# Patient Record
Sex: FEMALE | Race: BLACK OR AFRICAN AMERICAN | NOT HISPANIC OR LATINO
[De-identification: names, ages, dates, MRNs, and addresses within clinical notes are randomized per-mention and may not be internally consistent; named-entity substitution may affect disease eponyms.]

---

## 2017-01-19 ENCOUNTER — APPOINTMENT (OUTPATIENT)
Dept: UROGYNECOLOGY | Facility: CLINIC | Age: 57
End: 2017-01-19

## 2017-01-19 VITALS
WEIGHT: 234 LBS | HEIGHT: 65 IN | SYSTOLIC BLOOD PRESSURE: 130 MMHG | DIASTOLIC BLOOD PRESSURE: 88 MMHG | BODY MASS INDEX: 38.99 KG/M2

## 2017-01-19 RX ORDER — BLOOD SUGAR DIAGNOSTIC
STRIP MISCELLANEOUS
Qty: 100 | Refills: 0 | Status: DISCONTINUED | COMMUNITY
Start: 2016-07-11 | End: 2017-01-19

## 2017-03-02 ENCOUNTER — RECORD ABSTRACTING (OUTPATIENT)
Age: 57
End: 2017-03-02

## 2017-03-02 DIAGNOSIS — Z80.3 FAMILY HISTORY OF MALIGNANT NEOPLASM OF BREAST: ICD-10-CM

## 2017-05-01 ENCOUNTER — APPOINTMENT (OUTPATIENT)
Dept: PODIATRY | Facility: CLINIC | Age: 57
End: 2017-05-01

## 2017-05-01 ENCOUNTER — OUTPATIENT (OUTPATIENT)
Dept: OUTPATIENT SERVICES | Facility: HOSPITAL | Age: 57
LOS: 1 days | Discharge: HOME | End: 2017-05-01

## 2017-05-01 VITALS
HEIGHT: 65 IN | DIASTOLIC BLOOD PRESSURE: 72 MMHG | BODY MASS INDEX: 38.65 KG/M2 | SYSTOLIC BLOOD PRESSURE: 122 MMHG | WEIGHT: 232 LBS | HEART RATE: 78 BPM

## 2017-06-02 ENCOUNTER — OTHER (OUTPATIENT)
Age: 57
End: 2017-06-02

## 2017-06-27 ENCOUNTER — APPOINTMENT (OUTPATIENT)
Dept: UROGYNECOLOGY | Facility: CLINIC | Age: 57
End: 2017-06-27

## 2017-06-28 DIAGNOSIS — M24.274 DISORDER OF LIGAMENT, RIGHT FOOT: ICD-10-CM

## 2017-06-28 DIAGNOSIS — M79.671 PAIN IN RIGHT FOOT: ICD-10-CM

## 2017-06-29 ENCOUNTER — OTHER (OUTPATIENT)
Age: 57
End: 2017-06-29

## 2017-07-14 ENCOUNTER — APPOINTMENT (OUTPATIENT)
Dept: UROGYNECOLOGY | Facility: CLINIC | Age: 57
End: 2017-07-14

## 2017-07-14 ENCOUNTER — OUTPATIENT (OUTPATIENT)
Dept: OUTPATIENT SERVICES | Facility: HOSPITAL | Age: 57
LOS: 1 days | Discharge: HOME | End: 2017-07-14

## 2017-07-14 VITALS
WEIGHT: 233 LBS | SYSTOLIC BLOOD PRESSURE: 128 MMHG | HEIGHT: 65 IN | DIASTOLIC BLOOD PRESSURE: 72 MMHG | BODY MASS INDEX: 38.82 KG/M2

## 2017-07-14 DIAGNOSIS — F93.0 SEPARATION ANXIETY DISORDER OF CHILDHOOD: ICD-10-CM

## 2017-07-14 DIAGNOSIS — E03.9 HYPOTHYROIDISM, UNSPECIFIED: ICD-10-CM

## 2017-07-14 DIAGNOSIS — F11.20 OPIOID DEPENDENCE, UNCOMPLICATED: ICD-10-CM

## 2017-07-14 DIAGNOSIS — I10 ESSENTIAL (PRIMARY) HYPERTENSION: ICD-10-CM

## 2017-07-14 DIAGNOSIS — M19.90 UNSPECIFIED OSTEOARTHRITIS, UNSPECIFIED SITE: ICD-10-CM

## 2017-07-14 DIAGNOSIS — M54.9 DORSALGIA, UNSPECIFIED: ICD-10-CM

## 2017-07-14 DIAGNOSIS — F41.9 ANXIETY DISORDER, UNSPECIFIED: ICD-10-CM

## 2017-07-14 DIAGNOSIS — F14.10 COCAINE ABUSE, UNCOMPLICATED: ICD-10-CM

## 2017-07-14 DIAGNOSIS — E66.9 OBESITY, UNSPECIFIED: ICD-10-CM

## 2017-07-14 DIAGNOSIS — F19.20 OTHER PSYCHOACTIVE SUBSTANCE DEPENDENCE, UNCOMPLICATED: ICD-10-CM

## 2017-07-14 DIAGNOSIS — K21.9 GASTRO-ESOPHAGEAL REFLUX DISEASE WITHOUT ESOPHAGITIS: ICD-10-CM

## 2017-07-14 DIAGNOSIS — A53.9 SYPHILIS, UNSPECIFIED: ICD-10-CM

## 2017-07-14 RX ORDER — OXYBUTYNIN CHLORIDE 10 MG/1
10 TABLET, EXTENDED RELEASE ORAL
Qty: 90 | Refills: 1 | Status: DISCONTINUED | COMMUNITY
Start: 2017-01-19 | End: 2017-07-14

## 2017-07-14 RX ORDER — OXYBUTYNIN CHLORIDE 10 MG/1
10 TABLET, EXTENDED RELEASE ORAL
Qty: 90 | Refills: 0 | Status: DISCONTINUED | COMMUNITY
Start: 2017-06-29 | End: 2017-07-14

## 2017-07-17 LAB
APPEARANCE UR: CLEAR
BACTERIA UR CULT: NORMAL
BILIRUB UR QL STRIP: NEGATIVE
COLOR UR: YELLOW
GLUCOSE UR STRIP-MCNC: NEGATIVE MG/DL
HGB UR QL STRIP: NEGATIVE
KETONES UR STRIP-MCNC: NEGATIVE MG/DL
NITRITE UR QL STRIP: NEGATIVE
PH UR STRIP: 6.5
PROT UR STRIP-MCNC: NEGATIVE MG/DL
SP GR UR STRIP: 1.01
UROBILINOGEN UR STRIP-MCNC: 0.2 MG/DL
WBC URNS QL MICRO: NEGATIVE

## 2017-08-07 ENCOUNTER — OUTPATIENT (OUTPATIENT)
Dept: OUTPATIENT SERVICES | Facility: HOSPITAL | Age: 57
LOS: 1 days | Discharge: HOME | End: 2017-08-07

## 2017-08-07 ENCOUNTER — APPOINTMENT (OUTPATIENT)
Dept: CARDIOLOGY | Facility: CLINIC | Age: 57
End: 2017-08-07

## 2017-08-07 VITALS
DIASTOLIC BLOOD PRESSURE: 76 MMHG | BODY MASS INDEX: 39.32 KG/M2 | WEIGHT: 236 LBS | SYSTOLIC BLOOD PRESSURE: 145 MMHG | HEART RATE: 59 BPM | HEIGHT: 65 IN

## 2017-08-07 DIAGNOSIS — F11.20 OPIOID DEPENDENCE, UNCOMPLICATED: ICD-10-CM

## 2017-08-07 DIAGNOSIS — E03.9 HYPOTHYROIDISM, UNSPECIFIED: ICD-10-CM

## 2017-08-07 DIAGNOSIS — Z82.49 FAMILY HISTORY OF ISCHEMIC HEART DISEASE AND OTHER DISEASES OF THE CIRCULATORY SYSTEM: ICD-10-CM

## 2017-08-07 DIAGNOSIS — M19.90 UNSPECIFIED OSTEOARTHRITIS, UNSPECIFIED SITE: ICD-10-CM

## 2017-08-07 DIAGNOSIS — I10 ESSENTIAL (PRIMARY) HYPERTENSION: ICD-10-CM

## 2017-08-07 DIAGNOSIS — E66.9 OBESITY, UNSPECIFIED: ICD-10-CM

## 2017-08-07 DIAGNOSIS — M54.9 DORSALGIA, UNSPECIFIED: ICD-10-CM

## 2017-08-07 DIAGNOSIS — F19.20 OTHER PSYCHOACTIVE SUBSTANCE DEPENDENCE, UNCOMPLICATED: ICD-10-CM

## 2017-08-07 DIAGNOSIS — F41.9 ANXIETY DISORDER, UNSPECIFIED: ICD-10-CM

## 2017-08-07 DIAGNOSIS — K21.9 GASTRO-ESOPHAGEAL REFLUX DISEASE WITHOUT ESOPHAGITIS: ICD-10-CM

## 2017-08-07 DIAGNOSIS — A53.9 SYPHILIS, UNSPECIFIED: ICD-10-CM

## 2017-08-07 DIAGNOSIS — F14.10 COCAINE ABUSE, UNCOMPLICATED: ICD-10-CM

## 2017-08-07 DIAGNOSIS — F93.0 SEPARATION ANXIETY DISORDER OF CHILDHOOD: ICD-10-CM

## 2017-08-14 DIAGNOSIS — R06.00 DYSPNEA, UNSPECIFIED: ICD-10-CM

## 2017-08-14 DIAGNOSIS — I10 ESSENTIAL (PRIMARY) HYPERTENSION: ICD-10-CM

## 2017-08-14 DIAGNOSIS — R94.31 ABNORMAL ELECTROCARDIOGRAM [ECG] [EKG]: ICD-10-CM

## 2017-09-02 ENCOUNTER — OUTPATIENT (OUTPATIENT)
Dept: OUTPATIENT SERVICES | Facility: HOSPITAL | Age: 57
LOS: 1 days | Discharge: HOME | End: 2017-09-02

## 2017-09-02 DIAGNOSIS — F41.9 ANXIETY DISORDER, UNSPECIFIED: ICD-10-CM

## 2017-09-02 DIAGNOSIS — F14.10 COCAINE ABUSE, UNCOMPLICATED: ICD-10-CM

## 2017-09-02 DIAGNOSIS — E66.9 OBESITY, UNSPECIFIED: ICD-10-CM

## 2017-09-02 DIAGNOSIS — I10 ESSENTIAL (PRIMARY) HYPERTENSION: ICD-10-CM

## 2017-09-02 DIAGNOSIS — M54.9 DORSALGIA, UNSPECIFIED: ICD-10-CM

## 2017-09-02 DIAGNOSIS — F93.0 SEPARATION ANXIETY DISORDER OF CHILDHOOD: ICD-10-CM

## 2017-09-02 DIAGNOSIS — K21.9 GASTRO-ESOPHAGEAL REFLUX DISEASE WITHOUT ESOPHAGITIS: ICD-10-CM

## 2017-09-02 DIAGNOSIS — E03.9 HYPOTHYROIDISM, UNSPECIFIED: ICD-10-CM

## 2017-09-02 DIAGNOSIS — F19.20 OTHER PSYCHOACTIVE SUBSTANCE DEPENDENCE, UNCOMPLICATED: ICD-10-CM

## 2017-09-02 DIAGNOSIS — A53.9 SYPHILIS, UNSPECIFIED: ICD-10-CM

## 2017-09-02 DIAGNOSIS — F11.20 OPIOID DEPENDENCE, UNCOMPLICATED: ICD-10-CM

## 2017-09-02 DIAGNOSIS — Z12.31 ENCOUNTER FOR SCREENING MAMMOGRAM FOR MALIGNANT NEOPLASM OF BREAST: ICD-10-CM

## 2017-09-02 DIAGNOSIS — M19.90 UNSPECIFIED OSTEOARTHRITIS, UNSPECIFIED SITE: ICD-10-CM

## 2017-09-16 ENCOUNTER — EMERGENCY (EMERGENCY)
Facility: HOSPITAL | Age: 57
LOS: 0 days | Discharge: HOME | End: 2017-09-16
Admitting: INTERNAL MEDICINE

## 2017-09-16 DIAGNOSIS — A53.9 SYPHILIS, UNSPECIFIED: ICD-10-CM

## 2017-09-16 DIAGNOSIS — F11.20 OPIOID DEPENDENCE, UNCOMPLICATED: ICD-10-CM
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
## 2017-09-16 DIAGNOSIS — M79.604 PAIN IN RIGHT LEG: ICD-10-CM

## 2017-09-16 DIAGNOSIS — Z79.899 OTHER LONG TERM (CURRENT) DRUG THERAPY: ICD-10-CM

## 2017-09-16 DIAGNOSIS — F41.9 ANXIETY DISORDER, UNSPECIFIED: ICD-10-CM

## 2017-09-16 DIAGNOSIS — I10 ESSENTIAL (PRIMARY) HYPERTENSION: ICD-10-CM

## 2017-09-16 DIAGNOSIS — E66.9 OBESITY, UNSPECIFIED: ICD-10-CM

## 2017-09-16 DIAGNOSIS — F14.10 COCAINE ABUSE, UNCOMPLICATED: ICD-10-CM

## 2017-09-16 DIAGNOSIS — M25.561 PAIN IN RIGHT KNEE: ICD-10-CM

## 2017-09-16 DIAGNOSIS — E03.9 HYPOTHYROIDISM, UNSPECIFIED: ICD-10-CM

## 2017-09-16 DIAGNOSIS — K21.9 GASTRO-ESOPHAGEAL REFLUX DISEASE WITHOUT ESOPHAGITIS: ICD-10-CM
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
## 2017-09-16 DIAGNOSIS — M19.90 UNSPECIFIED OSTEOARTHRITIS, UNSPECIFIED SITE: ICD-10-CM

## 2017-09-16 DIAGNOSIS — F93.0 SEPARATION ANXIETY DISORDER OF CHILDHOOD: ICD-10-CM

## 2017-09-16 DIAGNOSIS — M79.89 OTHER SPECIFIED SOFT TISSUE DISORDERS: ICD-10-CM

## 2017-09-16 DIAGNOSIS — F19.20 OTHER PSYCHOACTIVE SUBSTANCE DEPENDENCE, UNCOMPLICATED: ICD-10-CM

## 2017-09-16 DIAGNOSIS — M54.9 DORSALGIA, UNSPECIFIED: ICD-10-CM

## 2017-09-18 ENCOUNTER — OUTPATIENT (OUTPATIENT)
Dept: OUTPATIENT SERVICES | Facility: HOSPITAL | Age: 57
LOS: 1 days | Discharge: HOME | End: 2017-09-18

## 2017-09-18 ENCOUNTER — APPOINTMENT (OUTPATIENT)
Dept: CARDIOLOGY | Facility: CLINIC | Age: 57
End: 2017-09-18

## 2017-09-18 VITALS
BODY MASS INDEX: 0.33 KG/M2 | HEART RATE: 66 BPM | DIASTOLIC BLOOD PRESSURE: 89 MMHG | SYSTOLIC BLOOD PRESSURE: 152 MMHG | HEIGHT: 65 IN | WEIGHT: 2 LBS

## 2017-09-18 DIAGNOSIS — Z87.19 PERSONAL HISTORY OF OTHER DISEASES OF THE DIGESTIVE SYSTEM: ICD-10-CM

## 2017-09-18 DIAGNOSIS — F41.9 ANXIETY DISORDER, UNSPECIFIED: ICD-10-CM

## 2017-09-18 DIAGNOSIS — M54.9 DORSALGIA, UNSPECIFIED: ICD-10-CM

## 2017-09-18 DIAGNOSIS — F93.0 SEPARATION ANXIETY DISORDER OF CHILDHOOD: ICD-10-CM

## 2017-09-18 DIAGNOSIS — T85.49XA OTHER MECHANICAL COMPLICATION OF BREAST PROSTHESIS AND IMPLANT, INITIAL ENCOUNTER: ICD-10-CM

## 2017-09-18 DIAGNOSIS — M19.90 UNSPECIFIED OSTEOARTHRITIS, UNSPECIFIED SITE: ICD-10-CM

## 2017-09-18 DIAGNOSIS — E66.9 OBESITY, UNSPECIFIED: ICD-10-CM

## 2017-09-18 DIAGNOSIS — F11.20 OPIOID DEPENDENCE, UNCOMPLICATED: ICD-10-CM

## 2017-09-18 DIAGNOSIS — E03.9 HYPOTHYROIDISM, UNSPECIFIED: ICD-10-CM

## 2017-09-18 DIAGNOSIS — F19.20 OTHER PSYCHOACTIVE SUBSTANCE DEPENDENCE, UNCOMPLICATED: ICD-10-CM

## 2017-09-18 DIAGNOSIS — F14.10 COCAINE ABUSE, UNCOMPLICATED: ICD-10-CM

## 2017-09-18 DIAGNOSIS — I10 ESSENTIAL (PRIMARY) HYPERTENSION: ICD-10-CM

## 2017-09-18 DIAGNOSIS — K21.9 GASTRO-ESOPHAGEAL REFLUX DISEASE WITHOUT ESOPHAGITIS: ICD-10-CM

## 2017-09-18 DIAGNOSIS — A53.9 SYPHILIS, UNSPECIFIED: ICD-10-CM

## 2017-09-18 DIAGNOSIS — Z86.19 PERSONAL HISTORY OF OTHER INFECTIOUS AND PARASITIC DISEASES: ICD-10-CM

## 2017-09-19 DIAGNOSIS — Z86.018 PERSONAL HISTORY OF OTHER BENIGN NEOPLASM: ICD-10-CM

## 2017-09-19 DIAGNOSIS — Z86.69 PERSONAL HISTORY OF OTHER DISEASES OF THE NERVOUS SYSTEM AND SENSE ORGANS: ICD-10-CM

## 2017-09-19 DIAGNOSIS — Z87.898 PERSONAL HISTORY OF OTHER SPECIFIED CONDITIONS: ICD-10-CM

## 2017-09-19 DIAGNOSIS — Z87.19 PERSONAL HISTORY OF OTHER DISEASES OF THE DIGESTIVE SYSTEM: ICD-10-CM

## 2017-09-28 ENCOUNTER — OUTPATIENT (OUTPATIENT)
Dept: OUTPATIENT SERVICES | Facility: HOSPITAL | Age: 57
LOS: 1 days | Discharge: HOME | End: 2017-09-28

## 2017-09-28 DIAGNOSIS — A53.9 SYPHILIS, UNSPECIFIED: ICD-10-CM

## 2017-09-28 DIAGNOSIS — F14.10 COCAINE ABUSE, UNCOMPLICATED: ICD-10-CM

## 2017-09-28 DIAGNOSIS — M54.9 DORSALGIA, UNSPECIFIED: ICD-10-CM

## 2017-09-28 DIAGNOSIS — F19.20 OTHER PSYCHOACTIVE SUBSTANCE DEPENDENCE, UNCOMPLICATED: ICD-10-CM

## 2017-09-28 DIAGNOSIS — F41.9 ANXIETY DISORDER, UNSPECIFIED: ICD-10-CM

## 2017-09-28 DIAGNOSIS — I10 ESSENTIAL (PRIMARY) HYPERTENSION: ICD-10-CM

## 2017-09-28 DIAGNOSIS — M19.90 UNSPECIFIED OSTEOARTHRITIS, UNSPECIFIED SITE: ICD-10-CM

## 2017-09-28 DIAGNOSIS — K21.9 GASTRO-ESOPHAGEAL REFLUX DISEASE WITHOUT ESOPHAGITIS: ICD-10-CM

## 2017-09-28 DIAGNOSIS — E66.9 OBESITY, UNSPECIFIED: ICD-10-CM

## 2017-09-28 DIAGNOSIS — R06.09 OTHER FORMS OF DYSPNEA: ICD-10-CM

## 2017-09-28 DIAGNOSIS — F11.20 OPIOID DEPENDENCE, UNCOMPLICATED: ICD-10-CM

## 2017-09-28 DIAGNOSIS — E03.9 HYPOTHYROIDISM, UNSPECIFIED: ICD-10-CM

## 2017-09-28 DIAGNOSIS — F93.0 SEPARATION ANXIETY DISORDER OF CHILDHOOD: ICD-10-CM

## 2017-10-23 ENCOUNTER — APPOINTMENT (OUTPATIENT)
Dept: CARDIOLOGY | Facility: CLINIC | Age: 57
End: 2017-10-23

## 2017-10-23 ENCOUNTER — OUTPATIENT (OUTPATIENT)
Dept: OUTPATIENT SERVICES | Facility: HOSPITAL | Age: 57
LOS: 1 days | Discharge: HOME | End: 2017-10-23

## 2017-10-23 VITALS
HEIGHT: 65 IN | HEART RATE: 79 BPM | BODY MASS INDEX: 41.48 KG/M2 | WEIGHT: 249 LBS | SYSTOLIC BLOOD PRESSURE: 125 MMHG | DIASTOLIC BLOOD PRESSURE: 79 MMHG

## 2017-10-23 DIAGNOSIS — E66.9 OBESITY, UNSPECIFIED: ICD-10-CM

## 2017-10-23 DIAGNOSIS — F14.10 COCAINE ABUSE, UNCOMPLICATED: ICD-10-CM

## 2017-10-23 DIAGNOSIS — M54.9 DORSALGIA, UNSPECIFIED: ICD-10-CM

## 2017-10-23 DIAGNOSIS — F93.0 SEPARATION ANXIETY DISORDER OF CHILDHOOD: ICD-10-CM

## 2017-10-23 DIAGNOSIS — F11.20 OPIOID DEPENDENCE, UNCOMPLICATED: ICD-10-CM

## 2017-10-23 DIAGNOSIS — I10 ESSENTIAL (PRIMARY) HYPERTENSION: ICD-10-CM

## 2017-10-23 DIAGNOSIS — E03.9 HYPOTHYROIDISM, UNSPECIFIED: ICD-10-CM

## 2017-10-23 DIAGNOSIS — F41.9 ANXIETY DISORDER, UNSPECIFIED: ICD-10-CM

## 2017-10-23 DIAGNOSIS — F19.20 OTHER PSYCHOACTIVE SUBSTANCE DEPENDENCE, UNCOMPLICATED: ICD-10-CM

## 2017-10-23 DIAGNOSIS — K21.9 GASTRO-ESOPHAGEAL REFLUX DISEASE WITHOUT ESOPHAGITIS: ICD-10-CM

## 2017-10-23 DIAGNOSIS — A53.9 SYPHILIS, UNSPECIFIED: ICD-10-CM

## 2017-10-23 DIAGNOSIS — M19.90 UNSPECIFIED OSTEOARTHRITIS, UNSPECIFIED SITE: ICD-10-CM

## 2017-10-23 RX ORDER — AMLODIPINE BESYLATE AND BENAZEPRIL HYDROCHLORIDE 5; 20 MG/1; MG/1
5-20 CAPSULE ORAL
Qty: 30 | Refills: 0 | Status: DISCONTINUED | COMMUNITY
Start: 2016-10-26 | End: 2017-10-23

## 2017-11-14 ENCOUNTER — APPOINTMENT (OUTPATIENT)
Dept: SURGERY | Facility: CLINIC | Age: 57
End: 2017-11-14
Payer: MEDICAID

## 2017-11-14 VITALS
BODY MASS INDEX: 41.87 KG/M2 | WEIGHT: 254.4 LBS | DIASTOLIC BLOOD PRESSURE: 84 MMHG | SYSTOLIC BLOOD PRESSURE: 142 MMHG | HEIGHT: 65.5 IN

## 2017-11-14 PROCEDURE — SI006: CPT

## 2017-11-14 PROCEDURE — 99212 OFFICE O/P EST SF 10 MIN: CPT

## 2017-12-15 ENCOUNTER — EMERGENCY (EMERGENCY)
Facility: HOSPITAL | Age: 57
LOS: 0 days | Discharge: HOME | End: 2017-12-15
Admitting: INTERNAL MEDICINE

## 2017-12-15 ENCOUNTER — APPOINTMENT (OUTPATIENT)
Dept: SURGERY | Facility: CLINIC | Age: 57
End: 2017-12-15
Payer: MEDICAID

## 2017-12-15 VITALS
DIASTOLIC BLOOD PRESSURE: 100 MMHG | HEIGHT: 65.5 IN | BODY MASS INDEX: 40 KG/M2 | SYSTOLIC BLOOD PRESSURE: 166 MMHG | WEIGHT: 243 LBS

## 2017-12-15 DIAGNOSIS — M54.9 DORSALGIA, UNSPECIFIED: ICD-10-CM

## 2017-12-15 DIAGNOSIS — E03.9 HYPOTHYROIDISM, UNSPECIFIED: ICD-10-CM

## 2017-12-15 DIAGNOSIS — K21.9 GASTRO-ESOPHAGEAL REFLUX DISEASE WITHOUT ESOPHAGITIS: ICD-10-CM

## 2017-12-15 DIAGNOSIS — Z79.899 OTHER LONG TERM (CURRENT) DRUG THERAPY: ICD-10-CM

## 2017-12-15 DIAGNOSIS — I10 ESSENTIAL (PRIMARY) HYPERTENSION: ICD-10-CM

## 2017-12-15 DIAGNOSIS — F19.20 OTHER PSYCHOACTIVE SUBSTANCE DEPENDENCE, UNCOMPLICATED: ICD-10-CM

## 2017-12-15 DIAGNOSIS — F93.0 SEPARATION ANXIETY DISORDER OF CHILDHOOD: ICD-10-CM

## 2017-12-15 DIAGNOSIS — E66.9 OBESITY, UNSPECIFIED: ICD-10-CM

## 2017-12-15 DIAGNOSIS — A53.9 SYPHILIS, UNSPECIFIED: ICD-10-CM

## 2017-12-15 DIAGNOSIS — F11.20 OPIOID DEPENDENCE, UNCOMPLICATED: ICD-10-CM

## 2017-12-15 DIAGNOSIS — F14.10 COCAINE ABUSE, UNCOMPLICATED: ICD-10-CM

## 2017-12-15 DIAGNOSIS — J18.9 PNEUMONIA, UNSPECIFIED ORGANISM: ICD-10-CM

## 2017-12-15 DIAGNOSIS — R05 COUGH: ICD-10-CM

## 2017-12-15 DIAGNOSIS — M19.90 UNSPECIFIED OSTEOARTHRITIS, UNSPECIFIED SITE: ICD-10-CM

## 2017-12-15 DIAGNOSIS — R10.13 EPIGASTRIC PAIN: ICD-10-CM

## 2017-12-15 DIAGNOSIS — F41.9 ANXIETY DISORDER, UNSPECIFIED: ICD-10-CM

## 2017-12-15 DIAGNOSIS — F32.9 MAJOR DEPRESSIVE DISORDER, SINGLE EPISODE, UNSPECIFIED: ICD-10-CM

## 2017-12-15 PROCEDURE — 99204 OFFICE O/P NEW MOD 45 MIN: CPT

## 2017-12-18 ENCOUNTER — OUTPATIENT (OUTPATIENT)
Dept: OUTPATIENT SERVICES | Facility: HOSPITAL | Age: 57
LOS: 1 days | Discharge: HOME | End: 2017-12-18

## 2017-12-18 ENCOUNTER — APPOINTMENT (OUTPATIENT)
Dept: CARDIOLOGY | Facility: CLINIC | Age: 57
End: 2017-12-18

## 2017-12-18 VITALS
BODY MASS INDEX: 39.05 KG/M2 | SYSTOLIC BLOOD PRESSURE: 134 MMHG | HEIGHT: 66 IN | DIASTOLIC BLOOD PRESSURE: 82 MMHG | HEART RATE: 83 BPM | WEIGHT: 243 LBS

## 2017-12-18 DIAGNOSIS — A53.9 SYPHILIS, UNSPECIFIED: ICD-10-CM

## 2017-12-18 DIAGNOSIS — F41.9 ANXIETY DISORDER, UNSPECIFIED: ICD-10-CM

## 2017-12-18 DIAGNOSIS — E03.9 HYPOTHYROIDISM, UNSPECIFIED: ICD-10-CM

## 2017-12-18 DIAGNOSIS — I10 ESSENTIAL (PRIMARY) HYPERTENSION: ICD-10-CM

## 2017-12-18 DIAGNOSIS — M19.90 UNSPECIFIED OSTEOARTHRITIS, UNSPECIFIED SITE: ICD-10-CM

## 2017-12-18 DIAGNOSIS — F11.20 OPIOID DEPENDENCE, UNCOMPLICATED: ICD-10-CM

## 2017-12-18 DIAGNOSIS — F19.20 OTHER PSYCHOACTIVE SUBSTANCE DEPENDENCE, UNCOMPLICATED: ICD-10-CM

## 2017-12-18 DIAGNOSIS — E66.9 OBESITY, UNSPECIFIED: ICD-10-CM

## 2017-12-18 DIAGNOSIS — M54.9 DORSALGIA, UNSPECIFIED: ICD-10-CM

## 2017-12-18 DIAGNOSIS — F93.0 SEPARATION ANXIETY DISORDER OF CHILDHOOD: ICD-10-CM

## 2017-12-18 DIAGNOSIS — F14.10 COCAINE ABUSE, UNCOMPLICATED: ICD-10-CM

## 2017-12-18 DIAGNOSIS — K21.9 GASTRO-ESOPHAGEAL REFLUX DISEASE WITHOUT ESOPHAGITIS: ICD-10-CM

## 2017-12-19 ENCOUNTER — APPOINTMENT (OUTPATIENT)
Dept: SURGERY | Facility: CLINIC | Age: 57
End: 2017-12-19
Payer: MEDICAID

## 2017-12-19 ENCOUNTER — OTHER (OUTPATIENT)
Age: 57
End: 2017-12-19

## 2017-12-19 VITALS — WEIGHT: 255 LBS | HEIGHT: 66 IN | BODY MASS INDEX: 40.98 KG/M2

## 2017-12-19 PROCEDURE — 97802 MEDICAL NUTRITION INDIV IN: CPT

## 2017-12-29 ENCOUNTER — OUTPATIENT (OUTPATIENT)
Dept: OUTPATIENT SERVICES | Facility: HOSPITAL | Age: 57
LOS: 1 days | Discharge: HOME | End: 2017-12-29

## 2017-12-29 DIAGNOSIS — I10 ESSENTIAL (PRIMARY) HYPERTENSION: ICD-10-CM

## 2017-12-29 DIAGNOSIS — M54.9 DORSALGIA, UNSPECIFIED: ICD-10-CM

## 2017-12-29 DIAGNOSIS — E66.9 OBESITY, UNSPECIFIED: ICD-10-CM

## 2017-12-29 DIAGNOSIS — F14.10 COCAINE ABUSE, UNCOMPLICATED: ICD-10-CM

## 2017-12-29 DIAGNOSIS — F11.20 OPIOID DEPENDENCE, UNCOMPLICATED: ICD-10-CM

## 2017-12-29 DIAGNOSIS — F19.20 OTHER PSYCHOACTIVE SUBSTANCE DEPENDENCE, UNCOMPLICATED: ICD-10-CM

## 2017-12-29 DIAGNOSIS — F93.0 SEPARATION ANXIETY DISORDER OF CHILDHOOD: ICD-10-CM

## 2017-12-29 DIAGNOSIS — E03.9 HYPOTHYROIDISM, UNSPECIFIED: ICD-10-CM

## 2017-12-29 DIAGNOSIS — A53.9 SYPHILIS, UNSPECIFIED: ICD-10-CM

## 2017-12-29 DIAGNOSIS — F41.9 ANXIETY DISORDER, UNSPECIFIED: ICD-10-CM

## 2017-12-29 DIAGNOSIS — M19.90 UNSPECIFIED OSTEOARTHRITIS, UNSPECIFIED SITE: ICD-10-CM

## 2017-12-29 DIAGNOSIS — K21.9 GASTRO-ESOPHAGEAL REFLUX DISEASE WITHOUT ESOPHAGITIS: ICD-10-CM

## 2018-01-02 ENCOUNTER — RX RENEWAL (OUTPATIENT)
Age: 58
End: 2018-01-02

## 2018-01-05 ENCOUNTER — OUTPATIENT (OUTPATIENT)
Dept: OUTPATIENT SERVICES | Facility: HOSPITAL | Age: 58
LOS: 1 days | Discharge: HOME | End: 2018-01-05

## 2018-01-05 ENCOUNTER — APPOINTMENT (OUTPATIENT)
Dept: GASTROENTEROLOGY | Facility: CLINIC | Age: 58
End: 2018-01-05

## 2018-01-05 VITALS — HEART RATE: 85 BPM | DIASTOLIC BLOOD PRESSURE: 80 MMHG | SYSTOLIC BLOOD PRESSURE: 142 MMHG

## 2018-01-05 DIAGNOSIS — I10 ESSENTIAL (PRIMARY) HYPERTENSION: ICD-10-CM

## 2018-01-05 DIAGNOSIS — F11.20 OPIOID DEPENDENCE, UNCOMPLICATED: ICD-10-CM

## 2018-01-05 DIAGNOSIS — A53.9 SYPHILIS, UNSPECIFIED: ICD-10-CM

## 2018-01-05 DIAGNOSIS — K21.9 GASTRO-ESOPHAGEAL REFLUX DISEASE WITHOUT ESOPHAGITIS: ICD-10-CM

## 2018-01-05 DIAGNOSIS — M54.9 DORSALGIA, UNSPECIFIED: ICD-10-CM

## 2018-01-05 DIAGNOSIS — F93.0 SEPARATION ANXIETY DISORDER OF CHILDHOOD: ICD-10-CM

## 2018-01-05 DIAGNOSIS — E03.9 HYPOTHYROIDISM, UNSPECIFIED: ICD-10-CM

## 2018-01-05 DIAGNOSIS — F14.10 COCAINE ABUSE, UNCOMPLICATED: ICD-10-CM

## 2018-01-05 DIAGNOSIS — F19.20 OTHER PSYCHOACTIVE SUBSTANCE DEPENDENCE, UNCOMPLICATED: ICD-10-CM

## 2018-01-05 DIAGNOSIS — M19.90 UNSPECIFIED OSTEOARTHRITIS, UNSPECIFIED SITE: ICD-10-CM

## 2018-01-05 DIAGNOSIS — E66.9 OBESITY, UNSPECIFIED: ICD-10-CM

## 2018-01-05 DIAGNOSIS — F41.9 ANXIETY DISORDER, UNSPECIFIED: ICD-10-CM

## 2018-01-08 ENCOUNTER — OUTPATIENT (OUTPATIENT)
Dept: OUTPATIENT SERVICES | Facility: HOSPITAL | Age: 58
LOS: 1 days | Discharge: HOME | End: 2018-01-08

## 2018-01-08 ENCOUNTER — EMERGENCY (EMERGENCY)
Facility: HOSPITAL | Age: 58
LOS: 0 days | Discharge: HOME | End: 2018-01-08
Admitting: INTERNAL MEDICINE

## 2018-01-08 DIAGNOSIS — R05 COUGH: ICD-10-CM

## 2018-01-08 DIAGNOSIS — A53.9 SYPHILIS, UNSPECIFIED: ICD-10-CM

## 2018-01-08 DIAGNOSIS — E03.9 HYPOTHYROIDISM, UNSPECIFIED: ICD-10-CM

## 2018-01-08 DIAGNOSIS — F14.10 COCAINE ABUSE, UNCOMPLICATED: ICD-10-CM

## 2018-01-08 DIAGNOSIS — N63.20 UNSPECIFIED LUMP IN THE LEFT BREAST, UNSPECIFIED QUADRANT: ICD-10-CM

## 2018-01-08 DIAGNOSIS — K21.9 GASTRO-ESOPHAGEAL REFLUX DISEASE WITHOUT ESOPHAGITIS: ICD-10-CM

## 2018-01-08 DIAGNOSIS — F41.9 ANXIETY DISORDER, UNSPECIFIED: ICD-10-CM

## 2018-01-08 DIAGNOSIS — I10 ESSENTIAL (PRIMARY) HYPERTENSION: ICD-10-CM

## 2018-01-08 DIAGNOSIS — F93.0 SEPARATION ANXIETY DISORDER OF CHILDHOOD: ICD-10-CM

## 2018-01-08 DIAGNOSIS — Z79.899 OTHER LONG TERM (CURRENT) DRUG THERAPY: ICD-10-CM

## 2018-01-08 DIAGNOSIS — E66.9 OBESITY, UNSPECIFIED: ICD-10-CM

## 2018-01-08 DIAGNOSIS — F11.20 OPIOID DEPENDENCE, UNCOMPLICATED: ICD-10-CM

## 2018-01-08 DIAGNOSIS — M19.90 UNSPECIFIED OSTEOARTHRITIS, UNSPECIFIED SITE: ICD-10-CM

## 2018-01-08 DIAGNOSIS — F19.20 OTHER PSYCHOACTIVE SUBSTANCE DEPENDENCE, UNCOMPLICATED: ICD-10-CM

## 2018-01-08 DIAGNOSIS — M54.9 DORSALGIA, UNSPECIFIED: ICD-10-CM

## 2018-01-08 DIAGNOSIS — Z88.1 ALLERGY STATUS TO OTHER ANTIBIOTIC AGENTS STATUS: ICD-10-CM

## 2018-01-08 DIAGNOSIS — J06.9 ACUTE UPPER RESPIRATORY INFECTION, UNSPECIFIED: ICD-10-CM

## 2018-01-08 DIAGNOSIS — E78.00 PURE HYPERCHOLESTEROLEMIA, UNSPECIFIED: ICD-10-CM

## 2018-01-08 DIAGNOSIS — F32.9 MAJOR DEPRESSIVE DISORDER, SINGLE EPISODE, UNSPECIFIED: ICD-10-CM

## 2018-01-12 ENCOUNTER — OUTPATIENT (OUTPATIENT)
Dept: OUTPATIENT SERVICES | Facility: HOSPITAL | Age: 58
LOS: 1 days | Discharge: HOME | End: 2018-01-12

## 2018-01-12 ENCOUNTER — APPOINTMENT (OUTPATIENT)
Dept: UROGYNECOLOGY | Facility: CLINIC | Age: 58
End: 2018-01-12

## 2018-01-12 VITALS
BODY MASS INDEX: 40.5 KG/M2 | SYSTOLIC BLOOD PRESSURE: 138 MMHG | DIASTOLIC BLOOD PRESSURE: 80 MMHG | WEIGHT: 252 LBS | HEIGHT: 66 IN

## 2018-01-12 DIAGNOSIS — F19.20 OTHER PSYCHOACTIVE SUBSTANCE DEPENDENCE, UNCOMPLICATED: ICD-10-CM

## 2018-01-12 DIAGNOSIS — M54.9 DORSALGIA, UNSPECIFIED: ICD-10-CM

## 2018-01-12 DIAGNOSIS — F11.20 OPIOID DEPENDENCE, UNCOMPLICATED: ICD-10-CM

## 2018-01-12 DIAGNOSIS — A53.9 SYPHILIS, UNSPECIFIED: ICD-10-CM

## 2018-01-12 DIAGNOSIS — I10 ESSENTIAL (PRIMARY) HYPERTENSION: ICD-10-CM

## 2018-01-12 DIAGNOSIS — F41.9 ANXIETY DISORDER, UNSPECIFIED: ICD-10-CM

## 2018-01-12 DIAGNOSIS — F93.0 SEPARATION ANXIETY DISORDER OF CHILDHOOD: ICD-10-CM

## 2018-01-12 DIAGNOSIS — M19.90 UNSPECIFIED OSTEOARTHRITIS, UNSPECIFIED SITE: ICD-10-CM

## 2018-01-12 DIAGNOSIS — K21.9 GASTRO-ESOPHAGEAL REFLUX DISEASE WITHOUT ESOPHAGITIS: ICD-10-CM

## 2018-01-12 DIAGNOSIS — E03.9 HYPOTHYROIDISM, UNSPECIFIED: ICD-10-CM

## 2018-01-12 DIAGNOSIS — F14.10 COCAINE ABUSE, UNCOMPLICATED: ICD-10-CM

## 2018-01-12 DIAGNOSIS — E66.9 OBESITY, UNSPECIFIED: ICD-10-CM

## 2018-01-18 ENCOUNTER — APPOINTMENT (OUTPATIENT)
Dept: SURGERY | Facility: CLINIC | Age: 58
End: 2018-01-18
Payer: MEDICAID

## 2018-01-18 VITALS — HEIGHT: 66 IN | WEIGHT: 257 LBS | BODY MASS INDEX: 41.3 KG/M2

## 2018-01-18 PROCEDURE — 99212 OFFICE O/P EST SF 10 MIN: CPT

## 2018-01-19 ENCOUNTER — OUTPATIENT (OUTPATIENT)
Dept: OUTPATIENT SERVICES | Facility: HOSPITAL | Age: 58
LOS: 1 days | Discharge: HOME | End: 2018-01-19

## 2018-01-19 ENCOUNTER — APPOINTMENT (OUTPATIENT)
Dept: PULMONOLOGY | Facility: CLINIC | Age: 58
End: 2018-01-19

## 2018-01-19 VITALS
HEIGHT: 66 IN | BODY MASS INDEX: 41.3 KG/M2 | HEART RATE: 84 BPM | OXYGEN SATURATION: 97 % | SYSTOLIC BLOOD PRESSURE: 126 MMHG | WEIGHT: 257 LBS | DIASTOLIC BLOOD PRESSURE: 86 MMHG

## 2018-01-19 DIAGNOSIS — F14.10 COCAINE ABUSE, UNCOMPLICATED: ICD-10-CM

## 2018-01-19 DIAGNOSIS — A53.9 SYPHILIS, UNSPECIFIED: ICD-10-CM

## 2018-01-19 DIAGNOSIS — M19.90 UNSPECIFIED OSTEOARTHRITIS, UNSPECIFIED SITE: ICD-10-CM

## 2018-01-19 DIAGNOSIS — K21.9 GASTRO-ESOPHAGEAL REFLUX DISEASE WITHOUT ESOPHAGITIS: ICD-10-CM

## 2018-01-19 DIAGNOSIS — F19.20 OTHER PSYCHOACTIVE SUBSTANCE DEPENDENCE, UNCOMPLICATED: ICD-10-CM

## 2018-01-19 DIAGNOSIS — F41.9 ANXIETY DISORDER, UNSPECIFIED: ICD-10-CM

## 2018-01-19 DIAGNOSIS — M54.9 DORSALGIA, UNSPECIFIED: ICD-10-CM

## 2018-01-19 DIAGNOSIS — F11.20 OPIOID DEPENDENCE, UNCOMPLICATED: ICD-10-CM

## 2018-01-19 DIAGNOSIS — F93.0 SEPARATION ANXIETY DISORDER OF CHILDHOOD: ICD-10-CM

## 2018-01-19 DIAGNOSIS — E66.9 OBESITY, UNSPECIFIED: ICD-10-CM

## 2018-01-19 DIAGNOSIS — I10 ESSENTIAL (PRIMARY) HYPERTENSION: ICD-10-CM

## 2018-01-19 DIAGNOSIS — E03.9 HYPOTHYROIDISM, UNSPECIFIED: ICD-10-CM

## 2018-01-22 DIAGNOSIS — G47.33 OBSTRUCTIVE SLEEP APNEA (ADULT) (PEDIATRIC): ICD-10-CM

## 2018-01-25 ENCOUNTER — APPOINTMENT (OUTPATIENT)
Dept: ANTEPARTUM | Facility: CLINIC | Age: 58
End: 2018-01-25

## 2018-01-25 ENCOUNTER — OUTPATIENT (OUTPATIENT)
Dept: OUTPATIENT SERVICES | Facility: HOSPITAL | Age: 58
LOS: 1 days | Discharge: HOME | End: 2018-01-25

## 2018-01-30 LAB
BASOPHILS # BLD: 0.02 TH/MM3
BASOPHILS NFR BLD: 0.3 %
DIFFERENTIAL METHOD BLD: NORMAL
EOSINOPHIL # BLD: 0.08 TH/MM3
EOSINOPHIL NFR BLD: 1.1 %
ERYTHROCYTE [DISTWIDTH] IN BLOOD BY AUTOMATED COUNT: 13 %
GRANULOCYTES # BLD: 4.35 TH/MM3
GRANULOCYTES NFR BLD: 57.2 %
HCT VFR BLD AUTO: 38.8 %
HGB BLD-MCNC: 12.8 G/DL
IMM GRANULOCYTES # BLD: 0.01 TH/MM3
IMM GRANULOCYTES NFR BLD: 0.1 %
LYMPHOCYTES # BLD: 2.4 TH/MM3
LYMPHOCYTES NFR BLD: 31.6 %
MCH RBC QN AUTO: 29.3 PG
MCHC RBC AUTO-ENTMCNC: 33 G/DL
MCV RBC AUTO: 88.8 FL
MONOCYTES # BLD: 0.74 TH/MM3
MONOCYTES NFR BLD: 9.7 %
PLATELET # BLD: 268 TH/MM3
PMV BLD AUTO: 10.3 FL
RBC # BLD AUTO: 4.37 MIL/MM3
WBC # BLD: 7.6 TH/MM3

## 2018-02-01 ENCOUNTER — RESULT REVIEW (OUTPATIENT)
Age: 58
End: 2018-02-01

## 2018-02-04 DIAGNOSIS — F14.10 COCAINE ABUSE, UNCOMPLICATED: ICD-10-CM

## 2018-02-04 DIAGNOSIS — I10 ESSENTIAL (PRIMARY) HYPERTENSION: ICD-10-CM

## 2018-02-04 DIAGNOSIS — F19.20 OTHER PSYCHOACTIVE SUBSTANCE DEPENDENCE, UNCOMPLICATED: ICD-10-CM

## 2018-02-04 DIAGNOSIS — E03.9 HYPOTHYROIDISM, UNSPECIFIED: ICD-10-CM

## 2018-02-04 DIAGNOSIS — F41.9 ANXIETY DISORDER, UNSPECIFIED: ICD-10-CM

## 2018-02-04 DIAGNOSIS — F11.20 OPIOID DEPENDENCE, UNCOMPLICATED: ICD-10-CM

## 2018-02-04 DIAGNOSIS — E66.9 OBESITY, UNSPECIFIED: ICD-10-CM

## 2018-02-04 DIAGNOSIS — M54.9 DORSALGIA, UNSPECIFIED: ICD-10-CM

## 2018-02-04 DIAGNOSIS — M19.90 UNSPECIFIED OSTEOARTHRITIS, UNSPECIFIED SITE: ICD-10-CM

## 2018-02-04 DIAGNOSIS — K21.9 GASTRO-ESOPHAGEAL REFLUX DISEASE WITHOUT ESOPHAGITIS: ICD-10-CM

## 2018-02-04 DIAGNOSIS — A53.9 SYPHILIS, UNSPECIFIED: ICD-10-CM

## 2018-02-04 DIAGNOSIS — F93.0 SEPARATION ANXIETY DISORDER OF CHILDHOOD: ICD-10-CM

## 2018-02-05 DIAGNOSIS — R52 PAIN, UNSPECIFIED: ICD-10-CM

## 2018-02-05 DIAGNOSIS — R06.02 SHORTNESS OF BREATH: ICD-10-CM

## 2018-02-12 ENCOUNTER — OUTPATIENT (OUTPATIENT)
Dept: OUTPATIENT SERVICES | Facility: HOSPITAL | Age: 58
LOS: 1 days | Discharge: HOME | End: 2018-02-12

## 2018-02-12 DIAGNOSIS — R10.10 UPPER ABDOMINAL PAIN, UNSPECIFIED: ICD-10-CM

## 2018-02-15 ENCOUNTER — APPOINTMENT (OUTPATIENT)
Dept: SURGERY | Facility: CLINIC | Age: 58
End: 2018-02-15

## 2018-02-23 ENCOUNTER — RESULT REVIEW (OUTPATIENT)
Age: 58
End: 2018-02-23

## 2018-02-23 ENCOUNTER — APPOINTMENT (OUTPATIENT)
Dept: SURGERY | Facility: CLINIC | Age: 58
End: 2018-02-23
Payer: MEDICAID

## 2018-02-23 VITALS — HEIGHT: 65.5 IN | BODY MASS INDEX: 42.3 KG/M2 | WEIGHT: 257 LBS

## 2018-02-23 PROCEDURE — 99212 OFFICE O/P EST SF 10 MIN: CPT

## 2018-02-28 ENCOUNTER — APPOINTMENT (OUTPATIENT)
Dept: UROGYNECOLOGY | Facility: CLINIC | Age: 58
End: 2018-02-28

## 2018-02-28 ENCOUNTER — OUTPATIENT (OUTPATIENT)
Dept: OUTPATIENT SERVICES | Facility: HOSPITAL | Age: 58
LOS: 1 days | Discharge: HOME | End: 2018-02-28

## 2018-02-28 VITALS — BODY MASS INDEX: 42.12 KG/M2 | SYSTOLIC BLOOD PRESSURE: 144 MMHG | WEIGHT: 257 LBS | DIASTOLIC BLOOD PRESSURE: 86 MMHG

## 2018-02-28 DIAGNOSIS — Z92.89 PERSONAL HISTORY OF OTHER MEDICAL TREATMENT: ICD-10-CM

## 2018-02-28 DIAGNOSIS — N81.10 CYSTOCELE, UNSPECIFIED: ICD-10-CM

## 2018-02-28 DIAGNOSIS — N32.81 OVERACTIVE BLADDER: ICD-10-CM

## 2018-02-28 DIAGNOSIS — Z12.72 ENCOUNTER FOR SCREENING FOR MALIGNANT NEOPLASM OF VAGINA: ICD-10-CM

## 2018-02-28 RX ORDER — OXYBUTYNIN CHLORIDE 10 MG/1
10 TABLET, EXTENDED RELEASE ORAL
Qty: 180 | Refills: 1 | Status: DISCONTINUED | COMMUNITY
Start: 2017-07-14 | End: 2018-02-28

## 2018-03-06 DIAGNOSIS — N39.46 MIXED INCONTINENCE: ICD-10-CM

## 2018-03-09 ENCOUNTER — OUTPATIENT (OUTPATIENT)
Dept: OUTPATIENT SERVICES | Facility: HOSPITAL | Age: 58
LOS: 1 days | Discharge: HOME | End: 2018-03-09

## 2018-03-12 DIAGNOSIS — G47.33 OBSTRUCTIVE SLEEP APNEA (ADULT) (PEDIATRIC): ICD-10-CM

## 2018-03-19 ENCOUNTER — RESULT REVIEW (OUTPATIENT)
Age: 58
End: 2018-03-19

## 2018-03-19 ENCOUNTER — OUTPATIENT (OUTPATIENT)
Dept: OUTPATIENT SERVICES | Facility: HOSPITAL | Age: 58
LOS: 1 days | Discharge: HOME | End: 2018-03-19

## 2018-03-19 VITALS
WEIGHT: 257.94 LBS | RESPIRATION RATE: 18 BRPM | TEMPERATURE: 98 F | DIASTOLIC BLOOD PRESSURE: 78 MMHG | HEIGHT: 65 IN | HEART RATE: 74 BPM | SYSTOLIC BLOOD PRESSURE: 142 MMHG

## 2018-03-19 VITALS — OXYGEN SATURATION: 98 % | RESPIRATION RATE: 18 BRPM

## 2018-03-19 DIAGNOSIS — Z30.2 ENCOUNTER FOR STERILIZATION: Chronic | ICD-10-CM

## 2018-03-19 DIAGNOSIS — Z98.890 OTHER SPECIFIED POSTPROCEDURAL STATES: Chronic | ICD-10-CM

## 2018-03-19 DIAGNOSIS — Z98.82 BREAST IMPLANT STATUS: Chronic | ICD-10-CM

## 2018-03-19 DIAGNOSIS — Z00.00 ENCOUNTER FOR GENERAL ADULT MEDICAL EXAMINATION W/OUT ABNORMAL FINDINGS: ICD-10-CM

## 2018-03-21 LAB — SURGICAL PATHOLOGY STUDY: SIGNIFICANT CHANGE UP

## 2018-03-22 ENCOUNTER — APPOINTMENT (OUTPATIENT)
Dept: SURGERY | Facility: CLINIC | Age: 58
End: 2018-03-22
Payer: MEDICAID

## 2018-03-22 VITALS — HEIGHT: 65.5 IN | BODY MASS INDEX: 43.46 KG/M2 | WEIGHT: 264 LBS

## 2018-03-22 PROCEDURE — 99212 OFFICE O/P EST SF 10 MIN: CPT

## 2018-03-23 DIAGNOSIS — K20.9 ESOPHAGITIS, UNSPECIFIED: ICD-10-CM

## 2018-03-23 DIAGNOSIS — D12.5 BENIGN NEOPLASM OF SIGMOID COLON: ICD-10-CM

## 2018-03-23 DIAGNOSIS — K64.8 OTHER HEMORRHOIDS: ICD-10-CM

## 2018-03-23 DIAGNOSIS — Z12.11 ENCOUNTER FOR SCREENING FOR MALIGNANT NEOPLASM OF COLON: ICD-10-CM

## 2018-03-23 DIAGNOSIS — K29.70 GASTRITIS, UNSPECIFIED, WITHOUT BLEEDING: ICD-10-CM

## 2018-03-23 DIAGNOSIS — D12.3 BENIGN NEOPLASM OF TRANSVERSE COLON: ICD-10-CM

## 2018-03-23 DIAGNOSIS — D12.2 BENIGN NEOPLASM OF ASCENDING COLON: ICD-10-CM

## 2018-03-23 LAB — SURGICAL PATHOLOGY STUDY: SIGNIFICANT CHANGE UP

## 2018-03-27 DIAGNOSIS — Z87.19 PERSONAL HISTORY OF OTHER DISEASES OF THE DIGESTIVE SYSTEM: ICD-10-CM

## 2018-03-30 ENCOUNTER — OUTPATIENT (OUTPATIENT)
Dept: OUTPATIENT SERVICES | Facility: HOSPITAL | Age: 58
LOS: 1 days | Discharge: HOME | End: 2018-03-30

## 2018-03-30 ENCOUNTER — APPOINTMENT (OUTPATIENT)
Dept: PULMONOLOGY | Facility: CLINIC | Age: 58
End: 2018-03-30

## 2018-03-30 VITALS
HEART RATE: 81 BPM | DIASTOLIC BLOOD PRESSURE: 91 MMHG | HEIGHT: 65.5 IN | SYSTOLIC BLOOD PRESSURE: 146 MMHG | WEIGHT: 264 LBS | OXYGEN SATURATION: 94 % | BODY MASS INDEX: 43.46 KG/M2

## 2018-03-30 DIAGNOSIS — Z30.2 ENCOUNTER FOR STERILIZATION: Chronic | ICD-10-CM

## 2018-03-30 DIAGNOSIS — Z98.82 BREAST IMPLANT STATUS: Chronic | ICD-10-CM

## 2018-03-30 DIAGNOSIS — Z98.890 OTHER SPECIFIED POSTPROCEDURAL STATES: Chronic | ICD-10-CM

## 2018-04-06 ENCOUNTER — OUTPATIENT (OUTPATIENT)
Dept: OUTPATIENT SERVICES | Facility: HOSPITAL | Age: 58
LOS: 1 days | Discharge: HOME | End: 2018-04-06

## 2018-04-06 ENCOUNTER — APPOINTMENT (OUTPATIENT)
Dept: GASTROENTEROLOGY | Facility: CLINIC | Age: 58
End: 2018-04-06

## 2018-04-06 VITALS
BODY MASS INDEX: 43.43 KG/M2 | HEART RATE: 92 BPM | WEIGHT: 265 LBS | SYSTOLIC BLOOD PRESSURE: 119 MMHG | DIASTOLIC BLOOD PRESSURE: 86 MMHG

## 2018-04-06 DIAGNOSIS — Z98.82 BREAST IMPLANT STATUS: Chronic | ICD-10-CM

## 2018-04-06 DIAGNOSIS — Z30.2 ENCOUNTER FOR STERILIZATION: Chronic | ICD-10-CM

## 2018-04-06 DIAGNOSIS — Z98.890 OTHER SPECIFIED POSTPROCEDURAL STATES: Chronic | ICD-10-CM

## 2018-04-06 DIAGNOSIS — J44.9 CHRONIC OBSTRUCTIVE PULMONARY DISEASE, UNSPECIFIED: ICD-10-CM

## 2018-04-11 ENCOUNTER — APPOINTMENT (OUTPATIENT)
Dept: UROGYNECOLOGY | Facility: CLINIC | Age: 58
End: 2018-04-11

## 2018-04-11 ENCOUNTER — OUTPATIENT (OUTPATIENT)
Dept: OUTPATIENT SERVICES | Facility: HOSPITAL | Age: 58
LOS: 1 days | Discharge: HOME | End: 2018-04-11

## 2018-04-11 VITALS
DIASTOLIC BLOOD PRESSURE: 82 MMHG | HEIGHT: 65.5 IN | SYSTOLIC BLOOD PRESSURE: 128 MMHG | WEIGHT: 265 LBS | BODY MASS INDEX: 43.62 KG/M2

## 2018-04-11 DIAGNOSIS — R13.19 OTHER DYSPHAGIA: ICD-10-CM

## 2018-04-11 DIAGNOSIS — Z87.448 PERSONAL HISTORY OF OTHER DISEASES OF URINARY SYSTEM: ICD-10-CM

## 2018-04-11 DIAGNOSIS — Z98.890 OTHER SPECIFIED POSTPROCEDURAL STATES: Chronic | ICD-10-CM

## 2018-04-11 DIAGNOSIS — N39.46 MIXED INCONTINENCE: ICD-10-CM

## 2018-04-11 DIAGNOSIS — Z87.898 PERSONAL HISTORY OF OTHER SPECIFIED CONDITIONS: ICD-10-CM

## 2018-04-11 DIAGNOSIS — Z98.82 BREAST IMPLANT STATUS: Chronic | ICD-10-CM

## 2018-04-11 DIAGNOSIS — Z30.2 ENCOUNTER FOR STERILIZATION: Chronic | ICD-10-CM

## 2018-04-11 RX ORDER — MIRABEGRON 25 MG/1
25 TABLET, FILM COATED, EXTENDED RELEASE ORAL
Qty: 90 | Refills: 1 | Status: DISCONTINUED | COMMUNITY
Start: 2018-04-11 | End: 2018-04-11

## 2018-04-16 ENCOUNTER — APPOINTMENT (OUTPATIENT)
Dept: VASCULAR SURGERY | Facility: CLINIC | Age: 58
End: 2018-04-16
Payer: MEDICAID

## 2018-04-16 VITALS
WEIGHT: 260 LBS | HEIGHT: 65.5 IN | SYSTOLIC BLOOD PRESSURE: 130 MMHG | BODY MASS INDEX: 42.8 KG/M2 | DIASTOLIC BLOOD PRESSURE: 90 MMHG

## 2018-04-16 PROCEDURE — 99203 OFFICE O/P NEW LOW 30 MIN: CPT

## 2018-04-24 ENCOUNTER — APPOINTMENT (OUTPATIENT)
Dept: SURGERY | Facility: CLINIC | Age: 58
End: 2018-04-24
Payer: MEDICAID

## 2018-04-24 VITALS — HEIGHT: 65.5 IN | BODY MASS INDEX: 42.8 KG/M2 | WEIGHT: 260 LBS

## 2018-04-24 PROCEDURE — 99212 OFFICE O/P EST SF 10 MIN: CPT

## 2018-04-30 ENCOUNTER — APPOINTMENT (OUTPATIENT)
Dept: CARDIOLOGY | Facility: CLINIC | Age: 58
End: 2018-04-30

## 2018-04-30 ENCOUNTER — OUTPATIENT (OUTPATIENT)
Dept: OUTPATIENT SERVICES | Facility: HOSPITAL | Age: 58
LOS: 1 days | Discharge: HOME | End: 2018-04-30

## 2018-04-30 VITALS
WEIGHT: 263 LBS | SYSTOLIC BLOOD PRESSURE: 152 MMHG | DIASTOLIC BLOOD PRESSURE: 86 MMHG | BODY MASS INDEX: 43.29 KG/M2 | HEART RATE: 83 BPM | HEIGHT: 65.5 IN

## 2018-04-30 DIAGNOSIS — Z98.890 OTHER SPECIFIED POSTPROCEDURAL STATES: Chronic | ICD-10-CM

## 2018-04-30 DIAGNOSIS — Z98.82 BREAST IMPLANT STATUS: Chronic | ICD-10-CM

## 2018-04-30 DIAGNOSIS — Z30.2 ENCOUNTER FOR STERILIZATION: Chronic | ICD-10-CM

## 2018-05-30 ENCOUNTER — OTHER (OUTPATIENT)
Age: 58
End: 2018-05-30

## 2018-05-31 ENCOUNTER — APPOINTMENT (OUTPATIENT)
Dept: SURGERY | Facility: CLINIC | Age: 58
End: 2018-05-31
Payer: MEDICAID

## 2018-05-31 VITALS — WEIGHT: 251 LBS | BODY MASS INDEX: 41.32 KG/M2 | HEIGHT: 65.5 IN

## 2018-05-31 PROCEDURE — 99212 OFFICE O/P EST SF 10 MIN: CPT

## 2018-06-14 ENCOUNTER — OUTPATIENT (OUTPATIENT)
Dept: OUTPATIENT SERVICES | Facility: HOSPITAL | Age: 58
LOS: 1 days | Discharge: HOME | End: 2018-06-14

## 2018-06-14 DIAGNOSIS — E56.9 VITAMIN DEFICIENCY, UNSPECIFIED: ICD-10-CM

## 2018-06-14 DIAGNOSIS — K91.2 POSTSURGICAL MALABSORPTION, NOT ELSEWHERE CLASSIFIED: ICD-10-CM

## 2018-06-14 DIAGNOSIS — Z98.82 BREAST IMPLANT STATUS: Chronic | ICD-10-CM

## 2018-06-14 DIAGNOSIS — E87.8 OTHER DISORDERS OF ELECTROLYTE AND FLUID BALANCE, NOT ELSEWHERE CLASSIFIED: ICD-10-CM

## 2018-06-14 DIAGNOSIS — E78.89 OTHER LIPOPROTEIN METABOLISM DISORDERS: ICD-10-CM

## 2018-06-14 DIAGNOSIS — E07.9 DISORDER OF THYROID, UNSPECIFIED: ICD-10-CM

## 2018-06-14 DIAGNOSIS — E55.9 VITAMIN D DEFICIENCY, UNSPECIFIED: ICD-10-CM

## 2018-06-14 DIAGNOSIS — Z98.890 OTHER SPECIFIED POSTPROCEDURAL STATES: Chronic | ICD-10-CM

## 2018-06-14 DIAGNOSIS — D50.1 SIDEROPENIC DYSPHAGIA: ICD-10-CM

## 2018-06-14 DIAGNOSIS — E21.3 HYPERPARATHYROIDISM, UNSPECIFIED: ICD-10-CM

## 2018-06-14 DIAGNOSIS — Z30.2 ENCOUNTER FOR STERILIZATION: Chronic | ICD-10-CM

## 2018-06-14 DIAGNOSIS — D51.1 VITAMIN B12 DEFICIENCY ANEMIA DUE TO SELECTIVE VITAMIN B12 MALABSORPTION WITH PROTEINURIA: ICD-10-CM

## 2018-06-14 DIAGNOSIS — D50.8 OTHER IRON DEFICIENCY ANEMIAS: ICD-10-CM

## 2018-06-19 LAB
25(OH)D3 SERPL-MCNC: 42 NG/ML
ALBUMIN SERPL ELPH-MCNC: 4.1 G/DL
ALP BLD-CCNC: 71 U/L
ALT SERPL-CCNC: 17 U/L
ANION GAP SERPL CALC-SCNC: 15 MMOL/L
AST SERPL-CCNC: 17 U/L
BASOPHILS # BLD AUTO: 0.02 K/UL
BASOPHILS NFR BLD AUTO: 0.2 %
BILIRUB SERPL-MCNC: 0.3 MG/DL
BUN SERPL-MCNC: 22 MG/DL
CALCIUM SERPL-MCNC: 9.5 MG/DL
CALCIUM SERPL-MCNC: 9.8 MG/DL
CHLORIDE SERPL-SCNC: 101 MMOL/L
CHOLEST SERPL-MCNC: 157 MG/DL
CHOLEST/HDLC SERPL: 3.4 RATIO
CO2 SERPL-SCNC: 24 MMOL/L
CREAT SERPL-MCNC: 0.8 MG/DL
EOSINOPHIL # BLD AUTO: 0.04 K/UL
EOSINOPHIL NFR BLD AUTO: 0.5 %
FERRITIN SERPL-MCNC: 207 NG/ML
FOLATE RBC-MCNC: 1552 NG/ML
GLUCOSE SERPL-MCNC: 125 MG/DL
HCT VFR BLD CALC: 37.5 %
HCT VFR BLD CALC: 39 %
HDLC SERPL-MCNC: 46 MG/DL
HGB BLD-MCNC: 12.3 G/DL
IMM GRANULOCYTES NFR BLD AUTO: 0.4 %
IRON SATN MFR SERPL: 15 %
IRON SERPL-MCNC: 45 UG/DL
IRON SERPL-MCNC: 45 UG/DL
LDLC SERPL CALC-MCNC: 95 MG/DL
LYMPHOCYTES # BLD AUTO: 2.01 K/UL
LYMPHOCYTES NFR BLD AUTO: 24 %
MAN DIFF?: NORMAL
MCHC RBC-ENTMCNC: 29.1 PG
MCHC RBC-ENTMCNC: 32.8 G/DL
MCV RBC AUTO: 88.7 FL
MONOCYTES # BLD AUTO: 0.45 K/UL
MONOCYTES NFR BLD AUTO: 5.4 %
NEUTROPHILS # BLD AUTO: 5.84 K/UL
NEUTROPHILS NFR BLD AUTO: 69.5 %
PARATHYROID HORMONE INTACT: 36 PG/ML
PLATELET # BLD AUTO: 301 K/UL
POTASSIUM SERPL-SCNC: 4.2 MMOL/L
PROT SERPL-MCNC: 7.3 G/DL
RBC # BLD: 4.23 M/UL
RBC # FLD: 12.2 %
SODIUM SERPL-SCNC: 140 MMOL/L
T3FREE SERPL-MCNC: 2.85 PG/ML
T4 FREE SERPL-MCNC: 1 NG/DL
TIBC SERPL-MCNC: 294 UG/DL
TRIGL SERPL-MCNC: 133 MG/DL
TSH SERPL-ACNC: 0.91 UIU/ML
UIBC SERPL-MCNC: 249 UG/DL
VIT B1 SERPL-MCNC: 109 NMOL/L
VIT B12 SERPL-MCNC: 978 PG/ML
WBC # FLD AUTO: 8.39 K/UL

## 2018-06-26 ENCOUNTER — APPOINTMENT (OUTPATIENT)
Dept: SURGERY | Facility: CLINIC | Age: 58
End: 2018-06-26
Payer: MEDICAID

## 2018-06-26 ENCOUNTER — CLINICAL ADVICE (OUTPATIENT)
Age: 58
End: 2018-06-26

## 2018-06-26 VITALS — WEIGHT: 251 LBS | BODY MASS INDEX: 41.32 KG/M2 | HEIGHT: 65.5 IN

## 2018-06-26 PROCEDURE — 99212 OFFICE O/P EST SF 10 MIN: CPT

## 2018-06-28 ENCOUNTER — CLINICAL ADVICE (OUTPATIENT)
Age: 58
End: 2018-06-28

## 2018-07-17 PROBLEM — I10 ESSENTIAL (PRIMARY) HYPERTENSION: Chronic | Status: ACTIVE | Noted: 2018-03-19

## 2018-07-17 PROBLEM — F32.9 MAJOR DEPRESSIVE DISORDER, SINGLE EPISODE, UNSPECIFIED: Chronic | Status: ACTIVE | Noted: 2018-03-19

## 2018-07-17 PROBLEM — E78.00 PURE HYPERCHOLESTEROLEMIA, UNSPECIFIED: Chronic | Status: ACTIVE | Noted: 2018-03-19

## 2018-07-17 PROBLEM — E03.9 HYPOTHYROIDISM, UNSPECIFIED: Chronic | Status: ACTIVE | Noted: 2018-03-19

## 2018-07-20 ENCOUNTER — OUTPATIENT (OUTPATIENT)
Dept: OUTPATIENT SERVICES | Facility: HOSPITAL | Age: 58
LOS: 1 days | Discharge: HOME | End: 2018-07-20

## 2018-07-20 ENCOUNTER — APPOINTMENT (OUTPATIENT)
Dept: PULMONOLOGY | Facility: CLINIC | Age: 58
End: 2018-07-20

## 2018-07-20 VITALS
DIASTOLIC BLOOD PRESSURE: 83 MMHG | HEART RATE: 59 BPM | OXYGEN SATURATION: 99 % | WEIGHT: 246.92 LBS | RESPIRATION RATE: 17 BRPM | SYSTOLIC BLOOD PRESSURE: 159 MMHG | TEMPERATURE: 97 F | HEIGHT: 65 IN

## 2018-07-20 DIAGNOSIS — E66.01 MORBID (SEVERE) OBESITY DUE TO EXCESS CALORIES: ICD-10-CM

## 2018-07-20 DIAGNOSIS — Z98.890 OTHER SPECIFIED POSTPROCEDURAL STATES: Chronic | ICD-10-CM

## 2018-07-20 DIAGNOSIS — Z01.818 ENCOUNTER FOR OTHER PREPROCEDURAL EXAMINATION: ICD-10-CM

## 2018-07-20 DIAGNOSIS — Z98.82 BREAST IMPLANT STATUS: Chronic | ICD-10-CM

## 2018-07-20 DIAGNOSIS — Z30.2 ENCOUNTER FOR STERILIZATION: Chronic | ICD-10-CM

## 2018-07-20 LAB
ALBUMIN SERPL ELPH-MCNC: 4.1 G/DL — SIGNIFICANT CHANGE UP (ref 3.5–5.2)
ALP SERPL-CCNC: 63 U/L — SIGNIFICANT CHANGE UP (ref 30–115)
ALT FLD-CCNC: 26 U/L — SIGNIFICANT CHANGE UP (ref 0–41)
ANION GAP SERPL CALC-SCNC: 15 MMOL/L — HIGH (ref 7–14)
APPEARANCE UR: CLEAR — SIGNIFICANT CHANGE UP
APTT BLD: 38 SEC — SIGNIFICANT CHANGE UP (ref 27–39.2)
AST SERPL-CCNC: 25 U/L — SIGNIFICANT CHANGE UP (ref 0–41)
BASOPHILS # BLD AUTO: 0.02 K/UL — SIGNIFICANT CHANGE UP (ref 0–0.2)
BASOPHILS NFR BLD AUTO: 0.3 % — SIGNIFICANT CHANGE UP (ref 0–1)
BILIRUB SERPL-MCNC: 0.6 MG/DL — SIGNIFICANT CHANGE UP (ref 0.2–1.2)
BILIRUB UR-MCNC: NEGATIVE — SIGNIFICANT CHANGE UP
BUN SERPL-MCNC: 13 MG/DL — SIGNIFICANT CHANGE UP (ref 10–20)
CALCIUM SERPL-MCNC: 9 MG/DL — SIGNIFICANT CHANGE UP (ref 8.5–10.1)
CHLORIDE SERPL-SCNC: 97 MMOL/L — LOW (ref 98–110)
CO2 SERPL-SCNC: 27 MMOL/L — SIGNIFICANT CHANGE UP (ref 17–32)
COLOR SPEC: YELLOW — SIGNIFICANT CHANGE UP
CREAT SERPL-MCNC: 0.6 MG/DL — LOW (ref 0.7–1.5)
DIFF PNL FLD: NEGATIVE — SIGNIFICANT CHANGE UP
EOSINOPHIL # BLD AUTO: 0.09 K/UL — SIGNIFICANT CHANGE UP (ref 0–0.7)
EOSINOPHIL NFR BLD AUTO: 1.3 % — SIGNIFICANT CHANGE UP (ref 0–8)
GLUCOSE SERPL-MCNC: 104 MG/DL — HIGH (ref 70–99)
GLUCOSE UR QL: NEGATIVE — SIGNIFICANT CHANGE UP
HCT VFR BLD CALC: 35.9 % — LOW (ref 37–47)
HGB BLD-MCNC: 12.1 G/DL — SIGNIFICANT CHANGE UP (ref 12–16)
IMM GRANULOCYTES NFR BLD AUTO: 0.3 % — SIGNIFICANT CHANGE UP (ref 0.1–0.3)
INR BLD: 1.24 RATIO — SIGNIFICANT CHANGE UP (ref 0.65–1.3)
KETONES UR-MCNC: NEGATIVE — SIGNIFICANT CHANGE UP
LEUKOCYTE ESTERASE UR-ACNC: NEGATIVE — SIGNIFICANT CHANGE UP
LYMPHOCYTES # BLD AUTO: 1.98 K/UL — SIGNIFICANT CHANGE UP (ref 1.2–3.4)
LYMPHOCYTES # BLD AUTO: 27.8 % — SIGNIFICANT CHANGE UP (ref 20.5–51.1)
MCHC RBC-ENTMCNC: 28.7 PG — SIGNIFICANT CHANGE UP (ref 27–31)
MCHC RBC-ENTMCNC: 33.7 G/DL — SIGNIFICANT CHANGE UP (ref 32–37)
MCV RBC AUTO: 85.3 FL — SIGNIFICANT CHANGE UP (ref 81–99)
MONOCYTES # BLD AUTO: 0.52 K/UL — SIGNIFICANT CHANGE UP (ref 0.1–0.6)
MONOCYTES NFR BLD AUTO: 7.3 % — SIGNIFICANT CHANGE UP (ref 1.7–9.3)
NEUTROPHILS # BLD AUTO: 4.48 K/UL — SIGNIFICANT CHANGE UP (ref 1.4–6.5)
NEUTROPHILS NFR BLD AUTO: 63 % — SIGNIFICANT CHANGE UP (ref 42.2–75.2)
NITRITE UR-MCNC: NEGATIVE — SIGNIFICANT CHANGE UP
PH UR: 6 — SIGNIFICANT CHANGE UP (ref 5–8)
PLATELET # BLD AUTO: 290 K/UL — SIGNIFICANT CHANGE UP (ref 130–400)
POTASSIUM SERPL-MCNC: 4 MMOL/L — SIGNIFICANT CHANGE UP (ref 3.5–5)
POTASSIUM SERPL-SCNC: 4 MMOL/L — SIGNIFICANT CHANGE UP (ref 3.5–5)
PROT SERPL-MCNC: 7 G/DL — SIGNIFICANT CHANGE UP (ref 6–8)
PROT UR-MCNC: NEGATIVE — SIGNIFICANT CHANGE UP
PROTHROM AB SERPL-ACNC: 13.4 SEC — HIGH (ref 9.95–12.87)
RBC # BLD: 4.21 M/UL — SIGNIFICANT CHANGE UP (ref 4.2–5.4)
RBC # FLD: 11.9 % — SIGNIFICANT CHANGE UP (ref 11.5–14.5)
SODIUM SERPL-SCNC: 139 MMOL/L — SIGNIFICANT CHANGE UP (ref 135–146)
SP GR SPEC: 1.01 — SIGNIFICANT CHANGE UP (ref 1.01–1.03)
TYPE + AB SCN PNL BLD: SIGNIFICANT CHANGE UP
UROBILINOGEN FLD QL: 0.2 — SIGNIFICANT CHANGE UP (ref 0.2–0.2)
WBC # BLD: 7.11 K/UL — SIGNIFICANT CHANGE UP (ref 4.8–10.8)
WBC # FLD AUTO: 7.11 K/UL — SIGNIFICANT CHANGE UP (ref 4.8–10.8)

## 2018-07-20 NOTE — H&P PST ADULT - HISTORY OF PRESENT ILLNESS
57 yr old female known obesity electing surgical intervention with Dr. JERRI Nicholson Y 08/02/18  Pt at present denies any CP, SOB, Palpitations, or Dysuria  Pt states she can climb upto 1 FOS with minimal SOB due to body habitus  Pt dx with AMADEO has machine and uses machine

## 2018-07-20 NOTE — H&P PST ADULT - PMH
Depressed    Gastroesophageal reflux disease with esophagitis    Hypercholesteremia    Hypertension    Hypothyroid    Obesity (BMI 35.0-39.9 without comorbidity)    Overactive bladder

## 2018-07-20 NOTE — H&P PST ADULT - NS PRO TALK SOMEONE YN
History  Chief Complaint   Patient presents with    Flank Pain     patient woke up from a dead sleep with left flank pain  patient took 600 mg of Motrin and a hot bath without relief  51-year-old male presents with sharp left flank pain which wraps to the left lower quadrant  It awoke him from his sleep at approximately 3 this morning  Attempted to take some ibuprofen but he threw it up  He denies any lightheadedness diaphoresis he has no chest pain no shortness of breath or pleuritic pain  His associated with urinary urgency and increasing frequency by states he has only pain small amounts no gross hematuria no prior history of kidney stones no trauma or falls no fever chills cough or upper respiratory complaints no testicular pain or swelling no lower extremity edema no numbness tingling no weakness no bowel or bladder incontinence  Prior to Admission Medications   Prescriptions Last Dose Informant Patient Reported? Taking? Pseudoephedrine-APAP-DM (DAYQUIL PO)   Yes Yes   Sig: Take by mouth   amLODIPine (NORVASC) 10 mg tablet   No Yes   Sig: Take 1 tablet (10 mg total) by mouth daily for 90 days   lisinopril (ZESTRIL) 20 mg tablet   Yes Yes   Sig: Take 20 mg by mouth daily   losartan-hydrochlorothiazide (HYZAAR) 100-25 MG per tablet   No Yes   Sig: Take 1 tablet by mouth daily for 90 days      Facility-Administered Medications: None       Past Medical History:   Diagnosis Date    Cellulitis     Hypertension     Scrotal abscess     Scrotum swelling        Past Surgical History:   Procedure Laterality Date    EAR SURGERY      Ear pressure equalizaion tube, insertion, bilaterally    KNEE SURGERY      NOSE SURGERY      TONSILLECTOMY         Family History   Problem Relation Age of Onset    Nephrolithiasis Father     Hypertension Family      I have reviewed and agree with the history as documented      Social History   Substance Use Topics    Smoking status: Never Smoker    Smokeless tobacco: Never Used    Alcohol use No        Review of Systems   Constitutional: Negative for activity change, appetite change, chills, diaphoresis and fever  HENT: Negative for congestion, ear pain, rhinorrhea, sneezing and sore throat  Eyes: Negative for discharge  Respiratory: Negative for cough and shortness of breath  Cardiovascular: Negative for chest pain and leg swelling  Gastrointestinal: Positive for abdominal pain (LLQ), nausea and vomiting  Negative for blood in stool and diarrhea  Endocrine: Negative for polyuria  Genitourinary: Positive for flank pain (left), frequency and urgency  Negative for difficulty urinating, dysuria, hematuria, scrotal swelling and testicular pain  Musculoskeletal: Negative for back pain and myalgias  Skin: Negative for rash  Neurological: Negative for dizziness, weakness, light-headedness, numbness and headaches  Hematological: Negative for adenopathy  Psychiatric/Behavioral: Negative for confusion  All other systems reviewed and are negative  Physical Exam  ED Triage Vitals [04/06/18 0311]   Temperature Pulse Respirations Blood Pressure SpO2   98 4 °F (36 9 °C) 78 18 144/85 98 %      Temp Source Heart Rate Source Patient Position - Orthostatic VS BP Location FiO2 (%)   Temporal Monitor Lying Left arm --      Pain Score       5           Orthostatic Vital Signs  Vitals:    04/06/18 0311 04/06/18 0621   BP: 144/85 130/84   Pulse: 78 74   Patient Position - Orthostatic VS: Lying Lying       Physical Exam   Constitutional: He is oriented to person, place, and time  He appears well-developed and well-nourished  He appears distressed (mild - pain)  HENT:   Head: Normocephalic and atraumatic  Right Ear: External ear normal    Left Ear: External ear normal    Nose: Nose normal    Mouth/Throat: Oropharynx is clear and moist    Eyes: Conjunctivae and EOM are normal  Pupils are equal, round, and reactive to light  Right eye exhibits no discharge  Left eye exhibits no discharge  No scleral icterus  Neck: Normal range of motion  Neck supple  Cardiovascular: Normal rate, regular rhythm, normal heart sounds and intact distal pulses  Pulmonary/Chest: Effort normal and breath sounds normal  No respiratory distress  Abdominal: Soft  Bowel sounds are normal  He exhibits no distension and no mass  There is tenderness (mild LLQ tenderness)  There is no rebound and no guarding  Back: no mildline or CVA tenderness   Genitourinary:   Genitourinary Comments: deferred   Musculoskeletal: Normal range of motion  He exhibits no edema, tenderness or deformity  Lymphadenopathy:     He has no cervical adenopathy  Neurological: He is alert and oriented to person, place, and time  No cranial nerve deficit or sensory deficit  He exhibits normal muscle tone  Coordination normal    Gait steady   Skin: Skin is warm and dry  Capillary refill takes less than 2 seconds  He is not diaphoretic  Psychiatric: He has a normal mood and affect  Nursing note and vitals reviewed        ED Medications  Medications   ondansetron (ZOFRAN) injection 4 mg (4 mg Intravenous Given 4/6/18 0354)   sodium chloride 0 9 % bolus 1,000 mL (0 mL Intravenous Stopped 4/6/18 0532)   ketorolac (TORADOL) injection 15 mg (15 mg Intravenous Given 4/6/18 0354)   morphine (PF) 4 mg/mL injection 4 mg (4 mg Intravenous Given 4/6/18 0609)   tamsulosin (FLOMAX) capsule 0 4 mg (0 4 mg Oral Given 4/6/18 0612)       Diagnostic Studies  Results Reviewed     Procedure Component Value Units Date/Time    Urine Microscopic [[id number]]  (Abnormal) Collected:  04/06/18 0532    Lab Status:  Final result Specimen:  Urine from Urine, Clean Catch Updated:  04/06/18 0558     RBC, UA 10-20 (A) /hpf      WBC, UA 0-1 (A) /hpf      Epithelial Cells None Seen /hpf      Bacteria, UA Occasional /hpf      MUCOUS THREADS Occasional    UA w Reflex to Microscopic w Reflex to Culture [[id number]]  (Abnormal) Collected:  04/06/18 0532 Lab Status:  Final result Specimen:  Urine from Urine, Clean Catch Updated:  04/06/18 0550     Color, UA Yellow     Clarity, UA Clear     Specific Gravity, UA 1 010     pH, UA 6 0     Leukocytes, UA Negative     Nitrite, UA Negative     Protein, UA Negative mg/dl      Glucose, UA Negative mg/dl      Ketones, UA Negative mg/dl      Urobilinogen, UA 0 2 E U /dl      Bilirubin, UA Negative     Blood, UA Large (A)    CBC and differential [[id number]]  (Normal) Collected:  04/06/18 0323    Lab Status:  Final result Specimen:  Blood from Arm, Right Updated:  04/06/18 0400     WBC 9 29 Thousand/uL      RBC 5 51 Million/uL      Hemoglobin 16 1 g/dL      Hematocrit 45 4 %      MCV 82 fL      MCH 29 2 pg      MCHC 35 5 g/dL      RDW 13 0 %      MPV 10 3 fL      Platelets 200 Thousands/uL     Narrative: This is an appended report  These results have been appended to a previously verified report  Comprehensive metabolic panel [[id number]]  (Abnormal) Collected:  04/06/18 0323    Lab Status:  Final result Specimen:  Blood from Arm, Right Updated:  04/06/18 0350     Sodium 138 mmol/L      Potassium 3 3 (L) mmol/L      Chloride 100 mmol/L      CO2 26 mmol/L      Anion Gap 12 mmol/L      BUN 24 mg/dL      Creatinine 1 37 (H) mg/dL      Glucose 154 (H) mg/dL      Calcium 9 2 mg/dL      AST 23 U/L      ALT 54 U/L      Alkaline Phosphatase 71 U/L      Total Protein 7 5 g/dL      Albumin 3 6 g/dL      Total Bilirubin 0 50 mg/dL      eGFR 61 ml/min/1 73sq m     Narrative:         National Kidney Disease Education Program recommendations are as follows:  GFR calculation is accurate only with a steady state creatinine  Chronic Kidney disease less than 60 ml/min/1 73 sq  meters  Kidney failure less than 15 ml/min/1 73 sq  meters      Lipase [[id number]]  (Normal) Collected:  04/06/18 0323    Lab Status:  Final result Specimen:  Blood from Arm, Right Updated:  04/06/18 0350     Lipase 102 u/L                  CT renal stone study abdomen pelvis without contrast   Final Result by Briseyda Toney MD (04/06 9990)      Mild left hydroureteronephrosis due to a 3 mm calculus at the left ureterovesical junction  Findings are consistent with the preliminary report from [facility] which was provided shortly after completion of the exam                Workstation performed: [id number]                    Procedures  Procedures       Phone Contacts  ED Phone Contact    ED Course  ED Course as of Apr 06 1048   Fri Apr 06, 2018   0604 Alison Terry, [facility] urology     2255 Case reviewed with ned  Recommends outpt f/u and trial of BID flomax will emphasize to drink plenty of fliuds and be careful with postion changes    0636 Papmpaware pt found no rx - discussed futher refill from PMD                                MDM  Number of Diagnoses or Management Options  OMAR (acute kidney injury) ([facility] [address] ):   Ureterolithiasis:   Diagnosis management comments: Mdm: kidney stone, pyelonephritis, diverticullitis, AAA, PE unlikely secondary to no resp symptoms    Decreased NSAID dosing reviewed secodnary to OMAR    CritCare Time    Disposition  Final diagnoses:   Ureterolithiasis - 2-3 mm left distal ureter   OMAR (acute kidney injury) ([facility] [address] )     Time reflects when diagnosis was documented in both MDM as applicable and the Disposition within this note     Time User Action Codes Description Comment    4/6/2018  6:19 AM Sonal Lavpat Add [N20 1] Ureterolithiasis     4/6/2018  6:19 AM Haydee Fisher Modify [N20 1] Ureterolithiasis 2-3 mm left distal ureter    4/6/2018  6:20 AM Ke, [address] [N17 9] OMAR (acute kidney injury) [facility])       ED Disposition     ED Disposition Condition Comment    Discharge  Silver Fennel discharge to home/self care      Condition at discharge: Good        Follow-up Information     Follow up With Specialties Details Why Val Kingston MD Urology Call today follow up next week [address]  3[address] Floor  [facility] [address] [place] [address], DO Family Medicine  recheck blood sugar,  [address]  [address] [place] [address] [address] [facility]      Kye Zamorano MD Urology  f/u kidney stone recheck creatine [address]  [address] [place] [address]  [phone]          Discharge Medication List as of 4/6/2018  6:44 AM      START taking these medications    Details   HYDROcodone-acetaminophen (NORCO) 5-325 mg per tablet Take 1 tablet by mouth every 6 (six) hours as needed for pain for up to 20 doses Max Daily Amount: 4 tablets, Starting Fri 4/6/2018, Print      ondansetron (ZOFRAN-ODT) 4 mg disintegrating tablet Take 1 tablet (4 mg total) by mouth every 8 (eight) hours as needed for nausea or vomiting for up to 20 doses, Starting Fri 4/6/2018, Print      tamsulosin (FLOMAX) 0 4 mg Take 1 capsule (0 4 mg total) by mouth 2 (two) times a day for 30 doses, Starting Fri 4/6/2018, Until Sat 4/21/2018, Print         CONTINUE these medications which have NOT CHANGED    Details   amLODIPine (NORVASC) 10 mg tablet Take 1 tablet (10 mg total) by mouth daily for 90 days, Starting Mon 2/26/2018, Until Sun 5/27/2018, Print      lisinopril (ZESTRIL) 20 mg tablet Take 20 mg by mouth daily, Historical Med      losartan-hydrochlorothiazide (HYZAAR) 100-25 MG per tablet Take 1 tablet by mouth daily for 90 days, Starting Mon 2/26/2018, Until Sun 5/27/2018, Print      Pseudoephedrine-APAP-DM (DAYQUIL PO) Take by mouth, Historical Med           No discharge procedures on file      ED Provider  Electronically Signed by           Oswaldo Carvalho MD  04/06/18 4265 no

## 2018-07-20 NOTE — H&P PST ADULT - FAMILY HISTORY
Mother  Still living? No  CAD (coronary artery disease), Age at diagnosis: Age Unknown  Cancer, Age at diagnosis: Age Unknown     Sibling  Still living? No  Diabetes, Age at diagnosis: Age Unknown

## 2018-07-20 NOTE — H&P PST ADULT - PSH
Admission for tubal ligation    H/O arthroscopy of left knee  20 yrs ago  H/O breast augmentation    H/O plastic surgery  abdominal plasty  H/O right inguinal hernia repair  2014

## 2018-07-21 LAB
CULTURE RESULTS: NO GROWTH — SIGNIFICANT CHANGE UP
SPECIMEN SOURCE: SIGNIFICANT CHANGE UP

## 2018-07-24 RX ORDER — POLYETHYLENE GLYCOL 3350, SODIUM SULFATE ANHYDROUS, SODIUM BICARBONATE, SODIUM CHLORIDE, POTASSIUM CHLORIDE 227.1; 21.5; 6.36; 5.53; .754 G/L; G/L; G/L; G/L; G/L
227.1 POWDER, FOR SOLUTION ORAL
Qty: 1 | Refills: 0 | Status: COMPLETED | COMMUNITY
Start: 2018-01-05 | End: 2018-07-24

## 2018-07-25 ENCOUNTER — APPOINTMENT (OUTPATIENT)
Dept: SURGERY | Facility: CLINIC | Age: 58
End: 2018-07-25
Payer: MEDICAID

## 2018-07-25 VITALS
HEIGHT: 65.5 IN | DIASTOLIC BLOOD PRESSURE: 84 MMHG | BODY MASS INDEX: 40.05 KG/M2 | SYSTOLIC BLOOD PRESSURE: 142 MMHG | WEIGHT: 243.3 LBS

## 2018-07-25 PROCEDURE — 99215 OFFICE O/P EST HI 40 MIN: CPT

## 2018-08-02 ENCOUNTER — APPOINTMENT (OUTPATIENT)
Dept: SURGERY | Facility: HOSPITAL | Age: 58
End: 2018-08-02
Payer: MEDICAID

## 2018-08-02 ENCOUNTER — INPATIENT (INPATIENT)
Facility: HOSPITAL | Age: 58
LOS: 1 days | Discharge: HOME | End: 2018-08-04
Attending: SURGERY | Admitting: SURGERY
Payer: MEDICARE

## 2018-08-02 VITALS
HEIGHT: 65 IN | TEMPERATURE: 98 F | HEART RATE: 73 BPM | RESPIRATION RATE: 20 BRPM | WEIGHT: 242.07 LBS | SYSTOLIC BLOOD PRESSURE: 148 MMHG | DIASTOLIC BLOOD PRESSURE: 93 MMHG

## 2018-08-02 DIAGNOSIS — Z98.890 OTHER SPECIFIED POSTPROCEDURAL STATES: Chronic | ICD-10-CM

## 2018-08-02 DIAGNOSIS — Z98.82 BREAST IMPLANT STATUS: Chronic | ICD-10-CM

## 2018-08-02 DIAGNOSIS — Z30.2 ENCOUNTER FOR STERILIZATION: Chronic | ICD-10-CM

## 2018-08-02 PROCEDURE — 43644 LAP GASTRIC BYPASS/ROUX-EN-Y: CPT

## 2018-08-02 PROCEDURE — 43281 LAP PARAESOPHAG HERN REPAIR: CPT | Mod: 82

## 2018-08-02 PROCEDURE — 49585: CPT | Mod: 82

## 2018-08-02 PROCEDURE — 99291 CRITICAL CARE FIRST HOUR: CPT

## 2018-08-02 PROCEDURE — 43644 LAP GASTRIC BYPASS/ROUX-EN-Y: CPT | Mod: 82

## 2018-08-02 PROCEDURE — 99024 POSTOP FOLLOW-UP VISIT: CPT

## 2018-08-02 RX ORDER — MORPHINE SULFATE 50 MG/1
30 CAPSULE, EXTENDED RELEASE ORAL
Qty: 0 | Refills: 0 | Status: DISCONTINUED | OUTPATIENT
Start: 2018-08-02 | End: 2018-08-03

## 2018-08-02 RX ORDER — METOPROLOL TARTRATE 50 MG
5 TABLET ORAL EVERY 6 HOURS
Qty: 0 | Refills: 0 | Status: DISCONTINUED | OUTPATIENT
Start: 2018-08-02 | End: 2018-08-03

## 2018-08-02 RX ORDER — METOCLOPRAMIDE HCL 10 MG
10 TABLET ORAL ONCE
Qty: 0 | Refills: 0 | Status: DISCONTINUED | OUTPATIENT
Start: 2018-08-02 | End: 2018-08-02

## 2018-08-02 RX ORDER — KETOROLAC TROMETHAMINE 30 MG/ML
15 SYRINGE (ML) INJECTION EVERY 6 HOURS
Qty: 0 | Refills: 0 | Status: DISCONTINUED | OUTPATIENT
Start: 2018-08-02 | End: 2018-08-02

## 2018-08-02 RX ORDER — ACETAMINOPHEN 500 MG
1000 TABLET ORAL ONCE
Qty: 0 | Refills: 0 | Status: COMPLETED | OUTPATIENT
Start: 2018-08-02 | End: 2018-08-02

## 2018-08-02 RX ORDER — PANTOPRAZOLE SODIUM 20 MG/1
40 TABLET, DELAYED RELEASE ORAL ONCE
Qty: 0 | Refills: 0 | Status: COMPLETED | OUTPATIENT
Start: 2018-08-02 | End: 2018-08-02

## 2018-08-02 RX ORDER — SODIUM CHLORIDE 9 MG/ML
1000 INJECTION, SOLUTION INTRAVENOUS
Qty: 0 | Refills: 0 | Status: DISCONTINUED | OUTPATIENT
Start: 2018-08-02 | End: 2018-08-02

## 2018-08-02 RX ORDER — HYDROMORPHONE HYDROCHLORIDE 2 MG/ML
0.5 INJECTION INTRAMUSCULAR; INTRAVENOUS; SUBCUTANEOUS
Qty: 0 | Refills: 0 | Status: DISCONTINUED | OUTPATIENT
Start: 2018-08-02 | End: 2018-08-02

## 2018-08-02 RX ORDER — LEVOTHYROXINE SODIUM 125 MCG
25 TABLET ORAL
Qty: 0 | Refills: 0 | Status: DISCONTINUED | OUTPATIENT
Start: 2018-08-02 | End: 2018-08-04

## 2018-08-02 RX ORDER — ONDANSETRON 8 MG/1
4 TABLET, FILM COATED ORAL EVERY 6 HOURS
Qty: 0 | Refills: 0 | Status: DISCONTINUED | OUTPATIENT
Start: 2018-08-02 | End: 2018-08-04

## 2018-08-02 RX ORDER — CEFOTETAN DISODIUM 1 G
2 VIAL (EA) INJECTION EVERY 12 HOURS
Qty: 0 | Refills: 0 | Status: COMPLETED | OUTPATIENT
Start: 2018-08-02 | End: 2018-08-03

## 2018-08-02 RX ORDER — SODIUM CHLORIDE 9 MG/ML
1000 INJECTION, SOLUTION INTRAVENOUS
Qty: 0 | Refills: 0 | Status: DISCONTINUED | OUTPATIENT
Start: 2018-08-02 | End: 2018-08-04

## 2018-08-02 RX ORDER — HEPARIN SODIUM 5000 [USP'U]/ML
5000 INJECTION INTRAVENOUS; SUBCUTANEOUS EVERY 8 HOURS
Qty: 0 | Refills: 0 | Status: DISCONTINUED | OUTPATIENT
Start: 2018-08-02 | End: 2018-08-04

## 2018-08-02 RX ORDER — PANTOPRAZOLE SODIUM 20 MG/1
40 TABLET, DELAYED RELEASE ORAL DAILY
Qty: 0 | Refills: 0 | Status: DISCONTINUED | OUTPATIENT
Start: 2018-08-02 | End: 2018-08-04

## 2018-08-02 RX ADMIN — ONDANSETRON 4 MILLIGRAM(S): 8 TABLET, FILM COATED ORAL at 17:53

## 2018-08-02 RX ADMIN — Medication 400 MILLIGRAM(S): at 11:50

## 2018-08-02 RX ADMIN — PANTOPRAZOLE SODIUM 40 MILLIGRAM(S): 20 TABLET, DELAYED RELEASE ORAL at 21:44

## 2018-08-02 RX ADMIN — SODIUM CHLORIDE 100 MILLILITER(S): 9 INJECTION, SOLUTION INTRAVENOUS at 12:52

## 2018-08-02 RX ADMIN — Medication 15 MILLIGRAM(S): at 15:45

## 2018-08-02 RX ADMIN — HYDROMORPHONE HYDROCHLORIDE 0.5 MILLIGRAM(S): 2 INJECTION INTRAMUSCULAR; INTRAVENOUS; SUBCUTANEOUS at 14:10

## 2018-08-02 RX ADMIN — MORPHINE SULFATE 30 MILLILITER(S): 50 CAPSULE, EXTENDED RELEASE ORAL at 15:12

## 2018-08-02 RX ADMIN — HYDROMORPHONE HYDROCHLORIDE 0.5 MILLIGRAM(S): 2 INJECTION INTRAMUSCULAR; INTRAVENOUS; SUBCUTANEOUS at 13:35

## 2018-08-02 RX ADMIN — HEPARIN SODIUM 5000 UNIT(S): 5000 INJECTION INTRAVENOUS; SUBCUTANEOUS at 21:44

## 2018-08-02 RX ADMIN — PANTOPRAZOLE SODIUM 40 MILLIGRAM(S): 20 TABLET, DELAYED RELEASE ORAL at 14:55

## 2018-08-02 RX ADMIN — HYDROMORPHONE HYDROCHLORIDE 0.5 MILLIGRAM(S): 2 INJECTION INTRAMUSCULAR; INTRAVENOUS; SUBCUTANEOUS at 14:00

## 2018-08-02 RX ADMIN — SODIUM CHLORIDE 125 MILLILITER(S): 9 INJECTION, SOLUTION INTRAVENOUS at 15:00

## 2018-08-02 RX ADMIN — HYDROMORPHONE HYDROCHLORIDE 0.5 MILLIGRAM(S): 2 INJECTION INTRAMUSCULAR; INTRAVENOUS; SUBCUTANEOUS at 13:10

## 2018-08-02 RX ADMIN — Medication 15 MILLIGRAM(S): at 16:40

## 2018-08-02 RX ADMIN — HYDROMORPHONE HYDROCHLORIDE 0.5 MILLIGRAM(S): 2 INJECTION INTRAMUSCULAR; INTRAVENOUS; SUBCUTANEOUS at 14:40

## 2018-08-02 RX ADMIN — Medication 100 GRAM(S): at 17:42

## 2018-08-02 RX ADMIN — HEPARIN SODIUM 5000 UNIT(S): 5000 INJECTION INTRAVENOUS; SUBCUTANEOUS at 15:49

## 2018-08-02 NOTE — CONSULT NOTE ADULT - CONSULT REASON
s/  paulie-en-y gastric bypass, hiatal hernia repair s/  paulie-en-y gastric bypass, TAP block, hiatal hernia repair

## 2018-08-02 NOTE — CONSULT NOTE ADULT - ASSESSMENT
ASSESSMENT:  57y Female ***    PLAN:   Neurologic: Neuro intact, pain control Toradol 3x, PCA morphine,  Respiratory: AMADEO CPAP use at night. Wean to RA off NC 98% sat, encourage early ambulation  Cardiovascular: Normotensive, holding home losartain-hctz  Gastrointestinal/Nutrition: NPO, start bariactric protocol in AM, Zofran and Decadron for nausea, Protonix  Renal/Genitourinary: Mckeon, voiding urine  Hematologic: Hep SubQ 5000u, SCD  Infectious Disease: Ancef  Lines/Tubes: Peripheral IV b/l  Endocrine: Hypothyroid synthroid IV 25 mcg  Disposition: ICU    --------------------------------------------------------------------------------------    Critical Care Diagnoses: ASSESSMENT:  57y Female ***    PLAN:   Neurologic: Neuro intact, pain control Toradol 3x, PCA morphine,  Respiratory: AMADEO CPAP use at night. Wean to RA off 3L NC 98% sat, encourage early ambulation  Cardiovascular: Normotensive, holding home losartain-hctz  Gastrointestinal/Nutrition: NPO, start bariactric protocol in AM, Zofran and Decadron for nausea, Protonix  Renal/Genitourinary: Mckeon, voiding urine  Hematologic: Hep SubQ 5000u, SCD  Infectious Disease: Ancef  Lines/Tubes: Peripheral IV b/l  Endocrine: Hypothyroid synthroid IV 25 mcg  Disposition: ICU    --------------------------------------------------------------------------------------    Critical Care Diagnoses: ASSESSMENT:  57y Female ***    PLAN:   Neurologic: Neuro intact, pain control with Toradol 3x, PCA morphine  Respiratory: Wean to RA off 3L NC 98% sat, encourage early ambulation, CPAP for sleep  Cardiovascular: Normotensive, holding home losartain-hctz  Gastrointestinal/Nutrition: NPO, start bariatric protocol in AM, Zofran and Decadron for nausea, Protonix  Renal/Genitourinary: Mckeon, voiding urine, remove in AM  Hematologic: Hep SubQ 5000u, SCD  Infectious Disease: Cefotetan  Lines/Tubes: Peripheral IV b/l  Endocrine: Hypothyroid synthroid IV 25 mcg  Labs: 2330 BMP, CBC  Disposition: ICU    --------------------------------------------------------------------------------------    Critical Care Diagnoses: ASSESSMENT:  57y Female s/p lap paulie-en-y, TAP block, hiatal hernia repair    PLAN:   Neurologic: Neuro intact, pain control with Toradol 3x, PCA morphine  Respiratory: Wean to RA off 3L NC 98% sat, encourage early ambulation, CPAP for sleep  Cardiovascular: Normotensive, holding home losartain-hctz, LR @ 125ml/hr  Gastrointestinal/Nutrition: NPO, start bariatric protocol in AM, Zofran and Decadron for nausea, Protonix  Renal/Genitourinary: Mckeon, voiding urine, remove in AM  Hematologic: Hep SubQ 5000u, SCD  Infectious Disease: Cefotetan  Lines/Tubes: Peripheral IV b/l  Endocrine: Hypothyroid synthroid IV 25 mcg  Labs: 2330 BMP, CBC  Disposition: ICU

## 2018-08-02 NOTE — CHART NOTE - NSCHARTNOTEFT_GEN_A_CORE
GENERAL SURGERY PROGRESS NOTE     SLIME COHEN  57y  Female  Hospital day :1  POD: 0  Procedure: Gastric bypass, laparoscopic, using Franky-en-Y gastroenterostomy, with hiatal hernia repair    Patient alert, awake in recovery.  No vomiting or nausea.  Mckeon in place.  Laparoscopic sites clean dry and intact.  No pain in the abdomen.  No ambulation yet, states she wants to walk.  IS at bedside, instructions provided on use.     T(F): 98.1 (08-02-18 @ 16:30), Max: 98.3 (08-02-18 @ 07:03)  HR: 69 (08-02-18 @ 17:00) (62 - 73)  BP: 153/74 (08-02-18 @ 17:00) (139/64 - 156/68)  ABP: --  ABP(mean): --  RR: 16 (08-02-18 @ 17:00) (11 - 20)  SpO2: 97% (08-02-18 @ 17:00) (96% - 99%)    DIET/FLUIDS: lactated ringers. 1000 milliLiter(s) IV Continuous <Continuous>    URINE:    Indwelling Urethral Catheter:     Connect To:  Straight Drainage/Jewell    Indication:  Perioperative use for Selected Surgical Procedures    Additional Instructions:  DO NOT REMOVE without a discontinuation order (08-02-18 @ 13:19)    GI proph:  pantoprazole  Injectable 40 milliGRAM(s) IV Push daily    AC/ proph: heparin  Injectable 5000 Unit(s) SubCutaneous every 8 hours    ABx: cefoTEtan  IVPB 2 Gram(s) IV Intermittent every 12 hours      PHYSICAL EXAM:  GENERAL: NAD, well-appearing  CHEST/LUNG: Clear to auscultation bilaterally  HEART: Regular rate and rhythm  ABDOMEN: Soft, Nontender, nondistended, 5 laparoscopic sites, covered by bandaid and steri strips.  No drainage.    EXTREMITIES:  No clubbing, cyanosis, or edema      RADIOLOGY & ADDITIONAL TESTS:      A/P:  This is 57 female s/p Gastric bypass, laparoscopic, using Franky-en-Y gastroenterostomy, with hiatal hernia repair.  Vitals are stable, breathing comfortably, no nausea or vomitting.  -ICU  -NPO  -morphine PCA  -IVF   -ABX  -Ambulate  -Incentive spirometry  -I/O's  -DVT ppx  -AM labs

## 2018-08-02 NOTE — CONSULT NOTE ADULT - SUBJECTIVE AND OBJECTIVE BOX
SICU Consultation Note  =====================================================  Elvira Bhandari  57y Female  HPI: 56yo female with a history of HTN, HLD, GERD, AMADEO on CPAP, Obesity (BMI 40), hypothyroid, presents for elective paulie-en-y gastric bypass and hiatal hernia repair. Surgical history of Right inguinal hernia repair (), breast augmentation, abdominal plasty.  Patient was a difficult intubation, attempt was made 2x and achieved with [facility] video laryngoscope.  Patient had a 2.5hr OR case with no acute events intra op.During the case patient BP range was 110-160. She received 1g APAP, 1800 LR and UO of 650 via cesar. Patient was extubated with no acute complications in the PACU. She received decadron and zofran for nausea. Currently on Toradol 3x and morphine PCA for pain control. Patient is resting comfortably in PACU and will be transferred to ICU for overnight monitoring.      Surgery Information  OR time: 2.5hrs     EBL:  5cc        IV Fluids:  1800cc LR      Blood Products:   UOP:    650 Cesar      PAST MEDICAL & SURGICAL HISTORY:  Overactive bladder  Gastroesophageal reflux disease with esophagitis  Obesity (BMI 35.0-39.9 without comorbidity)  Hypercholesteremia  Depressed  Hypothyroid  Hypertension  H/O arthroscopy of left knee: 20 yrs ago  H/O right inguinal hernia repair:   H/O plastic surgery: abdominal plasty  H/O breast augmentation  Admission for tubal ligation    Home Meds: Home Medications:  atorvastatin 20 mg oral tablet: 1 tab(s) orally once a day (02 Aug 2018 07:14)  biotin 5000 mcg oral tablet, disintegratin tab(s) orally once a day (02 Aug 2018 07:14)  Centrum Silver Ultra Women&#x27;s oral tablet: 1 tab(s) orally once a day (02 Aug 2018 07:14)  folic acid 1 mg oral tablet: 1 tab(s) orally once a day (02 Aug 2018 07:14)  levothyroxine 50 mcg (0.05 mg) oral tablet: 1 tab(s) orally once a day (02 Aug 2018 07:14)  losartan-hydroCHLOROthiazide 100 mg-25 mg oral tablet: 1 tab(s) orally once a day (02 Aug 2018 07:14)  Myrbetriq 50 mg oral tablet, extended release: 1 tab(s) orally once a day (02 Aug 2018 07:14)  omeprazole 40 mg oral delayed release capsule: 1 cap(s) orally once a day (02 Aug 2018 07:14)  sertraline 100 mg oral tablet: 1 tab(s) orally once a day (02 Aug 2018 07:14)  Vitamin C 500 mg oral tablet: 1 tab(s) orally once a day (02 Aug 2018 07:14)    Allergies: Allergies    erythromycin (Stomach Upset; Diarrhea; Flatulence)    Intolerances    Soc:   Advanced Directives: Presumed Full Code       CURRENT MEDICATIONS:   --------------------------------------------------------------------------------------  Neurologic Medications  HYDROmorphone  Injectable 0.5 milliGRAM(s) IV Push every 10 minutes PRN Moderate Pain (4 - 6)  ketorolac   Injectable 15 milliGRAM(s) IV Push every 6 hours PRN Moderate Pain  metoclopramide Injectable 10 milliGRAM(s) IV Push once PRN Nausea and/or Vomiting  morphine PCA (5 mG/mL) 30 milliLiter(s) PCA Continuous PCA Continuous  ondansetron Injectable 4 milliGRAM(s) IV Push every 6 hours    Respiratory Medications    Cardiovascular Medications  metoprolol tartrate Injectable 5 milliGRAM(s) IV Push every 6 hours    Gastrointestinal Medications  lactated ringers. 1000 milliLiter(s) IV Continuous <Continuous>  lactated ringers. 1000 milliLiter(s) IV Continuous <Continuous>  pantoprazole  Injectable 40 milliGRAM(s) IV Push daily    Genitourinary Medications    Hematologic/Oncologic Medications  heparin  Injectable 5000 Unit(s) SubCutaneous every 8 hours    Antimicrobial/Immunologic Medications  cefoTEtan  IVPB 2 Gram(s) IV Intermittent every 12 hours    Endocrine/Metabolic Medications  levothyroxine Injectable 25 MICROGram(s) IV Push <User Schedule>    Topical/Other Medications    --------------------------------------------------------------------------------------    VITAL SIGNS, INS/OUTS (last 24 hours):  --------------------------------------------------------------------------------------  ICU Vital Signs Last 24 Hrs  T(C): 36.4 (02 Aug 2018 12:52), Max: 36.8 (02 Aug 2018 07:03)  T(F): 97.5 (02 Aug 2018 12:52), Max: 98.3 (02 Aug 2018 07:03)  HR: 64 (02 Aug 2018 13:07) (64 - 73)  BP: 148/63 (02 Aug 2018 13:07) (143/65 - 156/68)  BP(mean): --  ABP: --  ABP(mean): --  RR: 19 (02 Aug 2018 13:07) (17 - 20)  SpO2: 97% (02 Aug 2018 13:07) (96% - 97%)    I&O's Summary    --------------------------------------------------------------------------------------    EXAM:  General/Neuro  RASS:   GCS:   Exam: Normal, NAD, alert, oriented x 3, no focal deficits. PERRLA  ***    Respiratory  Exam: Lungs clear to auscultation, Normal expansion/effort.  ***  [] Tracheostomy   [] Intubated  Mechanical Ventilation:     Cardiovascular  Exam: S1, S2.  Regular rate and rhythm.  Peripheral edema  ***  Cardiac Rhythm: Normal Sinus Rhythm  ECHO:     GI  Exam: Abdomen soft, Non-tender, Non-distended.  Gastrostomy / Jejunostomy tube in place.  Nasogastric tube in place.  Colostomy / Ileostomy.  ***  Wound:   ***  Current Diet:  NPO***    Extremities  Exam: Extremities warm, pink, well-perfused.      Derm:  Exam: Good skin turgor, no skin breakdown.      :   Exam: Cesar catheter in place.         Tubes/Lines/Drains  ***  [x] Peripheral IV b/l             [X] Urinary Catheter		Date Placed: 18    IMAGING RESULTS SICU Consultation Note  =====================================================  Elvira Bhandari  57y Female  HPI: 58yo female with a history of HTN, HLD, GERD, AMADEO on CPAP, Obesity (BMI 40), hypothyroid, presents for elective paulie-en-y gastric bypass and hiatal hernia repair. Surgical history of Right inguinal hernia repair (), breast augmentation, abdominal plasty.  Patient was a difficult intubation, attempt was made 2x and achieved with [facility] video laryngoscope.  Patient had a 2.5hr OR case with no acute events intra op.During the case patient BP range was 110-160. She received 1g APAP, 1800 LR and UO of 650 via cesar. Patient was extubated with no acute complications in the PACU. She received decadron and zofran for nausea. Currently on Toradol 3x and morphine PCA for pain control. Hep 5000units Subq and SCD for DVT prophylaxis. Patient is resting comfortably in PACU and will be transferred to ICU for overnight monitoring.      Surgery Information  OR time: 2.5hrs     EBL:  5cc        IV Fluids:  1800cc LR      Blood Products:   UOP:    650 Cesar      PAST MEDICAL & SURGICAL HISTORY:  Overactive bladder  Gastroesophageal reflux disease with esophagitis  Obesity (BMI 35.0-39.9 without comorbidity)  Hypercholesteremia  Depressed  Hypothyroid  Hypertension  H/O arthroscopy of left knee: 20 yrs ago  H/O right inguinal hernia repair:   H/O plastic surgery: abdominal plasty  H/O breast augmentation  Admission for tubal ligation    Home Meds: Home Medications:  atorvastatin 20 mg oral tablet: 1 tab(s) orally once a day (02 Aug 2018 07:14)  biotin 5000 mcg oral tablet, disintegratin tab(s) orally once a day (02 Aug 2018 07:14)  Centrum Silver Ultra Women&#x27;s oral tablet: 1 tab(s) orally once a day (02 Aug 2018 07:14)  folic acid 1 mg oral tablet: 1 tab(s) orally once a day (02 Aug 2018 07:14)  levothyroxine 50 mcg (0.05 mg) oral tablet: 1 tab(s) orally once a day (02 Aug 2018 07:14)  losartan-hydroCHLOROthiazide 100 mg-25 mg oral tablet: 1 tab(s) orally once a day (02 Aug 2018 07:14)  Myrbetriq 50 mg oral tablet, extended release: 1 tab(s) orally once a day (02 Aug 2018 07:14)  omeprazole 40 mg oral delayed release capsule: 1 cap(s) orally once a day (02 Aug 2018 07:14)  sertraline 100 mg oral tablet: 1 tab(s) orally once a day (02 Aug 2018 07:14)  Vitamin C 500 mg oral tablet: 1 tab(s) orally once a day (02 Aug 2018 07:14)    Allergies: Allergies    erythromycin (Stomach Upset; Diarrhea; Flatulence)    Intolerances    Soc:   Advanced Directives: Presumed Full Code       CURRENT MEDICATIONS:   --------------------------------------------------------------------------------------  Neurologic Medications  HYDROmorphone  Injectable 0.5 milliGRAM(s) IV Push every 10 minutes PRN Moderate Pain (4 - 6)  ketorolac   Injectable 15 milliGRAM(s) IV Push every 6 hours PRN Moderate Pain  metoclopramide Injectable 10 milliGRAM(s) IV Push once PRN Nausea and/or Vomiting  morphine PCA (5 mG/mL) 30 milliLiter(s) PCA Continuous PCA Continuous  ondansetron Injectable 4 milliGRAM(s) IV Push every 6 hours    Respiratory Medications    Cardiovascular Medications  metoprolol tartrate Injectable 5 milliGRAM(s) IV Push every 6 hours    Gastrointestinal Medications  lactated ringers. 1000 milliLiter(s) IV Continuous <Continuous>  lactated ringers. 1000 milliLiter(s) IV Continuous <Continuous>  pantoprazole  Injectable 40 milliGRAM(s) IV Push daily    Genitourinary Medications    Hematologic/Oncologic Medications  heparin  Injectable 5000 Unit(s) SubCutaneous every 8 hours    Antimicrobial/Immunologic Medications  cefoTEtan  IVPB 2 Gram(s) IV Intermittent every 12 hours    Endocrine/Metabolic Medications  levothyroxine Injectable 25 MICROGram(s) IV Push <User Schedule>    Topical/Other Medications    --------------------------------------------------------------------------------------    VITAL SIGNS, INS/OUTS (last 24 hours):  --------------------------------------------------------------------------------------  ICU Vital Signs Last 24 Hrs  T(C): 36.4 (02 Aug 2018 12:52), Max: 36.8 (02 Aug 2018 07:03)  T(F): 97.5 (02 Aug 2018 12:52), Max: 98.3 (02 Aug 2018 07:03)  HR: 64 (02 Aug 2018 13:07) (64 - 73)  BP: 148/63 (02 Aug 2018 13:07) (143/65 - 156/68)  RR: 19 (02 Aug 2018 13:07) (17 - 20)  SpO2: 97% (02 Aug 2018 13:07) (96% - 97%)    I&O's Summary    --------------------------------------------------------------------------------------    EXAM:  General/Neuro  GCS: 15  Exam: Normal, NAD, alert, oriented x 3, no focal deficits.     Respiratory  Exam: Lungs clear to auscultation, Normal expansion/effort.     Cardiovascular  Exam: S1, S2.  Regular rate and rhythm. No peripheral edema  Cardiac Rhythm: Normal Sinus Rhythm    GI  Exam: Abdomen soft, Non-tender, Non-distended.   Wound:   5 incision sites clean, dry and intact  Current Diet:  NPO    Extremities  Exam: Extremities warm, pink, well-perfused.      Derm:  Exam: Good skin turgor, no skin breakdown.      :   Exam: Cesar catheter in place.         Tubes/Lines/Drains    [x] Peripheral IV b/l  [X] Urinary Catheter		Date Placed: 18        [X ] A ten-point review of systems was otherwise negative except as noted.  [ ] Due to altered mental status/intubation, subjective information were not able to be obtained from the patient. History was obtained, to the extent possible, from review of the chart and collateral sources of information. SICU Consultation Note  =====================================================  Elvira Bhandari  HPI: 56yo female with a history of HTN, HLD, GERD, AMADEO on CPAP, Obesity (BMI 40), hypothyroid,  depression, presents for elective paulie-en-y gastric bypass and hiatal hernia repair. Surgical history of Right inguinal hernia repair (), breast augmentation, abdominal plasty.  Patient was a difficult intubation, attempt was made 2x and achieved with [facility] video laryngoscope.  Patient had a 2.5hr OR case with no acute events intra op. During the case patient BP range was 110-150s. She received 1g APAP, 1800 LR and had UO of 650 via cesar. Patient was extubated with no acute complications in the PACU. She received decadron and zofran for nausea. Currently on Toradol 3x and morphine PCA for pain control. Hep 5000units Subq and SCD for DVT prophylaxis. Cefotetan antibiotic prophylaxis. Patient is resting comfortably in PACU and will be transferred to ICU for overnight monitoring.      Surgery Information  OR time: 2.5hrs     EBL:  5cc        IV Fluids:  1800cc LR      Blood Products:   UOP:    650 Cesar      PAST MEDICAL & SURGICAL HISTORY:  Overactive bladder  Gastroesophageal reflux disease with esophagitis  Obesity (BMI 35.0-39.9 without comorbidity)  Hypercholesteremia  Depressed  Hypothyroid  Hypertension  H/O arthroscopy of left knee: 20 yrs ago  H/O right inguinal hernia repair:   H/O plastic surgery: abdominal plasty  H/O breast augmentation  Admission for tubal ligation    Home Meds: Home Medications:  atorvastatin 20 mg oral tablet: 1 tab(s) orally once a day (02 Aug 2018 07:14)  biotin 5000 mcg oral tablet, disintegratin tab(s) orally once a day (02 Aug 2018 07:14)  Centrum Silver Ultra Women&#x27;s oral tablet: 1 tab(s) orally once a day (02 Aug 2018 07:14)  folic acid 1 mg oral tablet: 1 tab(s) orally once a day (02 Aug 2018 07:14)  levothyroxine 50 mcg (0.05 mg) oral tablet: 1 tab(s) orally once a day (02 Aug 2018 07:14)  losartan-hydroCHLOROthiazide 100 mg-25 mg oral tablet: 1 tab(s) orally once a day (02 Aug 2018 07:14)  Myrbetriq 50 mg oral tablet, extended release: 1 tab(s) orally once a day (02 Aug 2018 07:14)  omeprazole 40 mg oral delayed release capsule: 1 cap(s) orally once a day (02 Aug 2018 07:14)  sertraline 100 mg oral tablet: 1 tab(s) orally once a day (02 Aug 2018 07:14)  Vitamin C 500 mg oral tablet: 1 tab(s) orally once a day (02 Aug 2018 07:14)    Allergies: Allergies    erythromycin (Stomach Upset; Diarrhea; Flatulence)    Intolerances    Soc:   Advanced Directives: Presumed Full Code       CURRENT MEDICATIONS:   --------------------------------------------------------------------------------------  Neurologic Medications  HYDROmorphone  Injectable 0.5 milliGRAM(s) IV Push every 10 minutes PRN Moderate Pain (4 - 6)  ketorolac   Injectable 15 milliGRAM(s) IV Push every 6 hours PRN Moderate Pain  metoclopramide Injectable 10 milliGRAM(s) IV Push once PRN Nausea and/or Vomiting  morphine PCA (5 mG/mL) 30 milliLiter(s) PCA Continuous PCA Continuous  ondansetron Injectable 4 milliGRAM(s) IV Push every 6 hours    Respiratory Medications    Cardiovascular Medications  metoprolol tartrate Injectable 5 milliGRAM(s) IV Push every 6 hours    Gastrointestinal Medications  lactated ringers. 1000 milliLiter(s) IV Continuous <Continuous>  lactated ringers. 1000 milliLiter(s) IV Continuous <Continuous>  pantoprazole  Injectable 40 milliGRAM(s) IV Push daily    Genitourinary Medications    Hematologic/Oncologic Medications  heparin  Injectable 5000 Unit(s) SubCutaneous every 8 hours    Antimicrobial/Immunologic Medications  cefoTEtan  IVPB 2 Gram(s) IV Intermittent every 12 hours    Endocrine/Metabolic Medications  levothyroxine Injectable 25 MICROGram(s) IV Push <User Schedule>    Topical/Other Medications    --------------------------------------------------------------------------------------    VITAL SIGNS, INS/OUTS (last 24 hours):  --------------------------------------------------------------------------------------  ICU Vital Signs Last 24 Hrs  T(C): 36.4 (02 Aug 2018 12:52), Max: 36.8 (02 Aug 2018 07:03)  T(F): 97.5 (02 Aug 2018 12:52), Max: 98.3 (02 Aug 2018 07:03)  HR: 64 (02 Aug 2018 13:07) (64 - 73)  BP: 148/63 (02 Aug 2018 13:07) (143/65 - 156/68)  RR: 19 (02 Aug 2018 13:07) (17 - 20)  SpO2: 97% (02 Aug 2018 13:07) (96% - 97%)    I&O's Summary    --------------------------------------------------------------------------------------    EXAM:  General/Neuro  GCS: 15  Exam: Normal, NAD, alert, oriented x 3, no focal deficits.     Respiratory  Exam: Lungs clear to auscultation, Normal expansion/effort.     Cardiovascular  Exam: S1, S2.  Regular rate and rhythm. No peripheral edema  Cardiac Rhythm: Normal Sinus Rhythm    GI  Exam: Abdomen soft, Non-tender, Non-distended.   Wound:   5 incision sites clean, dry and intact  Current Diet:  NPO    Extremities  Exam: Extremities warm, pink, well-perfused.      Derm:  Exam: Good skin turgor, no skin breakdown.      :   Exam: Cesar catheter in place.         Tubes/Lines/Drains    [x] Peripheral IV b/l  [X] Urinary Catheter		Date Placed: 18      [X ] A ten-point review of systems was otherwise negative except as noted.  [ ] Due to altered mental status/intubation, subjective information were not able to be obtained from the patient. History was obtained, to the extent possible, from review of the chart and collateral sources of information.

## 2018-08-02 NOTE — BRIEF OPERATIVE NOTE - PROCEDURE
<<-----Click on this checkbox to enter Procedure Gastric bypass, laparoscopic, using Franky-en-Y gastroenterostomy, with hiatal hernia repair  08/02/2018    Active  CHRIS

## 2018-08-03 LAB
ANION GAP SERPL CALC-SCNC: 14 MMOL/L — SIGNIFICANT CHANGE UP (ref 7–14)
BUN SERPL-MCNC: 6 MG/DL — LOW (ref 10–20)
CALCIUM SERPL-MCNC: 9 MG/DL — SIGNIFICANT CHANGE UP (ref 8.5–10.1)
CHLORIDE SERPL-SCNC: 99 MMOL/L — SIGNIFICANT CHANGE UP (ref 98–110)
CO2 SERPL-SCNC: 28 MMOL/L — SIGNIFICANT CHANGE UP (ref 17–32)
CREAT SERPL-MCNC: 0.5 MG/DL — LOW (ref 0.7–1.5)
GLUCOSE SERPL-MCNC: 89 MG/DL — SIGNIFICANT CHANGE UP (ref 70–99)
HCT VFR BLD CALC: 40 % — SIGNIFICANT CHANGE UP (ref 37–47)
HGB BLD-MCNC: 13.7 G/DL — SIGNIFICANT CHANGE UP (ref 12–16)
MAGNESIUM SERPL-MCNC: 1.7 MG/DL — LOW (ref 1.8–2.4)
MCHC RBC-ENTMCNC: 29 PG — SIGNIFICANT CHANGE UP (ref 27–31)
MCHC RBC-ENTMCNC: 34.3 G/DL — SIGNIFICANT CHANGE UP (ref 32–37)
MCV RBC AUTO: 84.7 FL — SIGNIFICANT CHANGE UP (ref 81–99)
NRBC # BLD: 0 /100 WBCS — SIGNIFICANT CHANGE UP (ref 0–0)
PHOSPHATE SERPL-MCNC: 4.1 MG/DL — SIGNIFICANT CHANGE UP (ref 2.1–4.9)
PLATELET # BLD AUTO: 243 K/UL — SIGNIFICANT CHANGE UP (ref 130–400)
POTASSIUM SERPL-MCNC: 4.2 MMOL/L — SIGNIFICANT CHANGE UP (ref 3.5–5)
POTASSIUM SERPL-SCNC: 4.2 MMOL/L — SIGNIFICANT CHANGE UP (ref 3.5–5)
RBC # BLD: 4.72 M/UL — SIGNIFICANT CHANGE UP (ref 4.2–5.4)
RBC # FLD: 12.2 % — SIGNIFICANT CHANGE UP (ref 11.5–14.5)
SODIUM SERPL-SCNC: 141 MMOL/L — SIGNIFICANT CHANGE UP (ref 135–146)
WBC # BLD: 9.13 K/UL — SIGNIFICANT CHANGE UP (ref 4.8–10.8)
WBC # FLD AUTO: 9.13 K/UL — SIGNIFICANT CHANGE UP (ref 4.8–10.8)

## 2018-08-03 PROCEDURE — 99291 CRITICAL CARE FIRST HOUR: CPT

## 2018-08-03 RX ORDER — OXYCODONE AND ACETAMINOPHEN 5; 325 MG/1; MG/1
1 TABLET ORAL EVERY 6 HOURS
Qty: 0 | Refills: 0 | Status: DISCONTINUED | OUTPATIENT
Start: 2018-08-03 | End: 2018-08-04

## 2018-08-03 RX ORDER — MAGNESIUM SULFATE 500 MG/ML
2 VIAL (ML) INJECTION ONCE
Qty: 0 | Refills: 0 | Status: COMPLETED | OUTPATIENT
Start: 2018-08-03 | End: 2018-08-03

## 2018-08-03 RX ORDER — DIPHENHYDRAMINE HCL 50 MG
50 CAPSULE ORAL ONCE
Qty: 0 | Refills: 0 | Status: COMPLETED | OUTPATIENT
Start: 2018-08-03 | End: 2018-08-03

## 2018-08-03 RX ORDER — LOSARTAN POTASSIUM 100 MG/1
100 TABLET, FILM COATED ORAL DAILY
Qty: 0 | Refills: 0 | Status: DISCONTINUED | OUTPATIENT
Start: 2018-08-03 | End: 2018-08-04

## 2018-08-03 RX ORDER — SERTRALINE 25 MG/1
100 TABLET, FILM COATED ORAL DAILY
Qty: 0 | Refills: 0 | Status: DISCONTINUED | OUTPATIENT
Start: 2018-08-03 | End: 2018-08-04

## 2018-08-03 RX ORDER — FOLIC ACID 0.8 MG
1 TABLET ORAL DAILY
Qty: 0 | Refills: 0 | Status: DISCONTINUED | OUTPATIENT
Start: 2018-08-03 | End: 2018-08-04

## 2018-08-03 RX ORDER — ATORVASTATIN CALCIUM 80 MG/1
20 TABLET, FILM COATED ORAL AT BEDTIME
Qty: 0 | Refills: 0 | Status: DISCONTINUED | OUTPATIENT
Start: 2018-08-03 | End: 2018-08-04

## 2018-08-03 RX ADMIN — PANTOPRAZOLE SODIUM 40 MILLIGRAM(S): 20 TABLET, DELAYED RELEASE ORAL at 13:15

## 2018-08-03 RX ADMIN — ONDANSETRON 4 MILLIGRAM(S): 8 TABLET, FILM COATED ORAL at 23:52

## 2018-08-03 RX ADMIN — Medication 50 GRAM(S): at 04:05

## 2018-08-03 RX ADMIN — ONDANSETRON 4 MILLIGRAM(S): 8 TABLET, FILM COATED ORAL at 13:15

## 2018-08-03 RX ADMIN — Medication 25 MICROGRAM(S): at 06:26

## 2018-08-03 RX ADMIN — LOSARTAN POTASSIUM 100 MILLIGRAM(S): 100 TABLET, FILM COATED ORAL at 22:01

## 2018-08-03 RX ADMIN — ONDANSETRON 4 MILLIGRAM(S): 8 TABLET, FILM COATED ORAL at 00:38

## 2018-08-03 RX ADMIN — ONDANSETRON 4 MILLIGRAM(S): 8 TABLET, FILM COATED ORAL at 17:46

## 2018-08-03 RX ADMIN — HEPARIN SODIUM 5000 UNIT(S): 5000 INJECTION INTRAVENOUS; SUBCUTANEOUS at 06:26

## 2018-08-03 RX ADMIN — ATORVASTATIN CALCIUM 20 MILLIGRAM(S): 80 TABLET, FILM COATED ORAL at 22:01

## 2018-08-03 RX ADMIN — Medication 100 GRAM(S): at 07:29

## 2018-08-03 RX ADMIN — Medication 1 MILLIGRAM(S): at 22:01

## 2018-08-03 RX ADMIN — SODIUM CHLORIDE 125 MILLILITER(S): 9 INJECTION, SOLUTION INTRAVENOUS at 17:51

## 2018-08-03 RX ADMIN — HEPARIN SODIUM 5000 UNIT(S): 5000 INJECTION INTRAVENOUS; SUBCUTANEOUS at 13:16

## 2018-08-03 RX ADMIN — SERTRALINE 100 MILLIGRAM(S): 25 TABLET, FILM COATED ORAL at 22:01

## 2018-08-03 RX ADMIN — ONDANSETRON 4 MILLIGRAM(S): 8 TABLET, FILM COATED ORAL at 06:27

## 2018-08-03 RX ADMIN — Medication 50 MILLIGRAM(S): at 00:54

## 2018-08-03 RX ADMIN — HEPARIN SODIUM 5000 UNIT(S): 5000 INJECTION INTRAVENOUS; SUBCUTANEOUS at 22:02

## 2018-08-03 NOTE — PROGRESS NOTE ADULT - SUBJECTIVE AND OBJECTIVE BOX
GENERAL SURGERY PROGRESS NOTE     SLIME COHEN  57y  Female  Hospital day :2d  POD: 1  Procedure: Gastric bypass, laparoscopic, using Franky-en-Y gastroenterostomy, with hiatal hernia repair    OVERNIGHT EVENTS:  Patient transferred to ICU 7 from PACU.  Patient ambulated in PACU, incentive spirometer used.  After using morphine PCA in ICU, patient experience itching, received diphenhydramine for symptoms, aided in relief.  Patient reports abdominal discomfort, feeling "full of gas" however has not passed any gas per rectum.  No nausea, no vomiting, no stool.  Patient and Nurse agree on ambulation plan for 2 am, patient wants to ambulate.  Denies headache, vision changes, SOB, or pain to the chest.  Mckeon draining well.     T(F): 96.8 (08-02-18 @ 19:04), Max: 98.3 (08-02-18 @ 07:03)  HR: 56 (08-02-18 @ 22:30) (54 - 73)  BP: 160/74 (08-02-18 @ 22:30) (139/64 - 161/70)  ABP: --  ABP(mean): --  RR: 13 (08-02-18 @ 22:30) (9 - 35)  SpO2: 96% (08-02-18 @ 22:30) (94% - 99%)    DIET/FLUIDS: lactated ringers. 1000 milliLiter(s) IV Continuous <Continuous>      URINE:    Indwelling Urethral Catheter:     Connect To:  Straight Drainage/[place]    Indication:  Perioperative use for Selected Surgical Procedures    Additional Instructions:  DO NOT REMOVE without a discontinuation order (08-02-18 @ 13:19)    Draining 75ml/hr as of 1:50AM    GI proph:  pantoprazole  Injectable 40 milliGRAM(s) IV Push daily    AC/ proph: heparin  Injectable 5000 Unit(s) SubCutaneous every 8 hours    ABx: cefoTEtan  IVPB 2 Gram(s) IV Intermittent every 12 hours      PHYSICAL EXAM:  GENERAL: NAD  CHEST/LUNG: Clear to auscultation bilaterally  HEART: bradycardic and normal rhythm, asymptomatic   ABDOMEN: Soft, mildly tender to palpation, Nondistended  EXTREMITIES:  No clubbing, cyanosis, or edema      LABS  Labs:  CAPILLARY BLOOD GLUCOSE      RADIOLOGY & ADDITIONAL TESTS:      A/P:  This is 57 year old female s/p gastric bypass, laparoscopic, using Franky-en-Y gastroenterostomy, with hiatal hernia repair who is now POD 1, recovering uneventfully in ICU  -Doing well, mild pain which is controlled by PCA at this time.  -NPO for now, bariatric clear protocol this AM  -IV ABx to 24hr course  -Ambulate  -Incentive spirometry  -I/O's strict  -Vitals  -DVT ppx  -GI ppx  -Close follow with MIS team GENERAL SURGERY PROGRESS NOTE     SLIME COHEN  57y  Female  POD: 1  Procedure: Gastric bypass, laparoscopic, using Franky-en-Y gastroenterostomy, with hiatal hernia repair    OVERNIGHT EVENTS:  Patient transferred to ICU 7 from PACU.  Patient ambulated in PACU, incentive spirometer used.  After using morphine PCA in ICU, patient experience itching, received diphenhydramine for symptoms, aided in relief.  Patient reports abdominal discomfort, feeling "full of gas".  No nausea, no vomiting, no stool.  Patient and Nurse agree on ambulation plan, patient wants to ambulate.  Denies headache, vision changes, SOB, or pain to the chest.  Mckeon draining well.     T(F): 96.8 (08-02-18 @ 19:04), Max: 98.3 (08-02-18 @ 07:03)  HR: 56 (08-02-18 @ 22:30) (54 - 73)  BP: 160/74 (08-02-18 @ 22:30) (139/64 - 161/70)  ABP: --  ABP(mean): --  RR: 13 (08-02-18 @ 22:30) (9 - 35)  SpO2: 96% (08-02-18 @ 22:30) (94% - 99%)    DIET/FLUIDS: lactated ringers. 1000 milliLiter(s) IV Continuous <Continuous>      URINE:    Indwelling Urethral Catheter:     Connect To:  Straight Drainage/[place]    Indication:  Perioperative use for Selected Surgical Procedures    Additional Instructions:  DO NOT REMOVE without a discontinuation order (08-02-18 @ 13:19)    Draining 75ml/hr as of 1:50AM    GI proph:  pantoprazole  Injectable 40 milliGRAM(s) IV Push daily    AC/ proph: heparin  Injectable 5000 Unit(s) SubCutaneous every 8 hours    ABx: cefoTEtan  IVPB 2 Gram(s) IV Intermittent every 12 hours      PHYSICAL EXAM:  GENERAL: NAD  CHEST/LUNG: Clear to auscultation bilaterally  HEART: bradycardic and normal rhythm, asymptomatic   ABDOMEN: Soft, mildly tender to palpation, Nondistended  EXTREMITIES:  No clubbing, cyanosis, or edema      LABS  Labs:  CAPILLARY BLOOD GLUCOSE      RADIOLOGY & ADDITIONAL TESTS:      A/P:  This is 57 year old female s/p laparoscopic gastric bypass, with hiatal hernia repair who is now POD 1, recovering uneventfully in ICU  -Doing well, mild pain which is controlled by PCA at this time.  -NPO for now, bariatric clear protocol this AM  -IV ABx to 24hr course  -Ambulate  -Incentive spirometry  -I/O's strict  -Vitals  -DVT ppx  -GI ppx

## 2018-08-03 NOTE — PROGRESS NOTE ADULT - ASSESSMENT
ASSESSMENT:  57y Female s/p lap paulie-en-y, TAP block, hiatal hernia repair    PLAN:   Neurologic: Neuro intact, pain control with  PCA morphine, switch to crushed percocet and DC PCA when tolerating 3 cups  Respiratory: Stable on room air encourage ambulation, CPAP for sleep  Cardiovascular: Normotensive, holding home losartan-hctz, LR @ 125ml/hr, restart when able to tolerate as per primary team  Gastrointestinal/Nutrition: NPO, start bariatric protocol Zofran and Decadron for nausea, Protonix  Renal/Genitourinary: Mckeon with adeqaute UO, dc with TOV  Hematologic: Hep SubQ 5000u, SCD  Infectious Disease: Cefotetan periop  Lines/Tubes: Peripheral IV b/l  Endocrine: Hypothyroid synthroid IV 25 mcg  Disposition: SICU, possible DG when tolerating 3oz and crushed percocet

## 2018-08-03 NOTE — CHART NOTE - NSCHARTNOTEFT_GEN_A_CORE
Consult for s/p bariatric surgery. Bariatric team is following. This consult will be marked complete

## 2018-08-03 NOTE — CHART NOTE - NSCHARTNOTEFT_GEN_A_CORE
SICU Transfer Note:    Transfer from: SICU  Transfer to:  ( x ) Surgery    (  ) Telemetry    (  ) Medicine    (  ) Palliative    (  ) Stroke Unit    (  ) _______________      SICU COURSE:  58yo female with a history of HTN, HLD, GERD, AMADEO on CPAP, Obesity (BMI 40), hypothyroid (synthroid),  depression (sertraline), presents for elective paulie-en-y gastric bypass and hiatal hernia repair. Surgical history of Right inguinal hernia repair (2014), b/l breast augmentation, abdominal plasty.  Patient was a difficult intubation, attempt was made 2x and achieved with [facility] video laryngoscope.  Patient had a 2.5hr OR case with no acute events intra op. During the case patient BP range was 110-150s, no administration of pressors or antihypertensives. She received 1g APAP, 1800 LR and had UO of 650 via cesar. Patient was extubated with no acute complications in the PACU. She received decadron and zofran for nausea. Received Toradol q8 and morphine PCA for pain control. Hep 5000units Subq and SCD for DVT prophylaxis. Cefotetan antibiotic prophylaxis. Patient is transferred to ICU for overnight monitoring. overnight, pt given dose of benadryl overnight for itchiness.     PAST MEDICAL & SURGICAL HISTORY:  Overactive bladder  Gastroesophageal reflux disease with esophagitis  Obesity (BMI 35.0-39.9 without comorbidity)  Hypercholesteremia  Depressed  Hypothyroid  Hypertension  H/O arthroscopy of left knee: 20 yrs ago  H/O right inguinal hernia repair: 2014  H/O plastic surgery: abdominal plasty  H/O breast augmentation  Admission for tubal ligation    Allergies    erythromycin (Stomach Upset; Diarrhea; Flatulence)    Intolerances      MEDICATIONS  (STANDING):  heparin  Injectable 5000 Unit(s) SubCutaneous every 8 hours  lactated ringers. 1000 milliLiter(s) (125 mL/Hr) IV Continuous <Continuous>  levothyroxine Injectable 25 MICROGram(s) IV Push <User Schedule>  morphine PCA (5 mG/mL) 30 milliLiter(s) PCA Continuous PCA Continuous  ondansetron Injectable 4 milliGRAM(s) IV Push every 6 hours  pantoprazole  Injectable 40 milliGRAM(s) IV Push daily    MEDICATIONS  (PRN):  enalaprilat Injectable 1.25 milliGRAM(s) IV Push every 6 hours PRN systolic >140        Vital Signs Last 24 Hrs  T(C): 36.4 (03 Aug 2018 12:16), Max: 36.8 (03 Aug 2018 04:00)  T(F): 97.6 (03 Aug 2018 12:16), Max: 98.3 (03 Aug 2018 04:00)  HR: 51 (03 Aug 2018 12:16) (51 - 74)  BP: 141/66 (03 Aug 2018 12:16) (139/64 - 169/68)  BP(mean): 100 (03 Aug 2018 10:00) (89 - 127)  RR: 16 (03 Aug 2018 12:16) (7 - 35)  SpO2: 95% (03 Aug 2018 10:00) (93% - 99%)  I&O's Summary    02 Aug 2018 07:01  -  03 Aug 2018 07:00  --------------------------------------------------------  IN: 2200 mL / OUT: 3375 mL / NET: -1175 mL    03 Aug 2018 07:01  -  03 Aug 2018 12:42  --------------------------------------------------------  IN: 375 mL / OUT: 0 mL / NET: 375 mL        LABS                                            13.7                  Neurophils% (auto):   x      (08-03 @ 00:42):    9.13 )-----------(243          Lymphocytes% (auto):  x                                             40.0                   Eosinphils% (auto):   x        Manual%: Neutrophils x    ; Lymphocytes x    ; Eosinophils x    ; Bands%: x    ; Blasts x                                    141    |  99     |  6                   Calcium: 9.0   / iCa: x      (08-03 @ 00:42)    ----------------------------<  89        Magnesium: 1.7                              4.2     |  28     |  0.5              Phosphorous: 4.1              ASSESSMENT/PLAN: 57yFemale    57y Female s/p lap paulie-en-y, TAP block, hiatal hernia repair    PLAN:   Neurologic: Neuro intact, pain control with  PCA morphine, switch to crushed percocet and DC PCA when tolerating 3 cups  Respiratory: Stable on room air encourage ambulation, CPAP for sleep  Cardiovascular: enalapril   Gastrointestinal/Nutrition: bariatric protocol Zofran and Decadron for nausea, Protonix  Renal/Genitourinary: f/u tov, initial tov output only 20cc  Hematologic: Hep SubQ 5000u, SCD  Infectious Disease: Cefotetan periop  Lines/Tubes: Peripheral IV b/l  Endocrine: Hypothyroid synthroid IV 25 mcg  Disposition: downgrade to floor      Pt ambulating   -started on phase one of bariatric protocol, tolerating   -per fellows request, metoprolol changed to enalapril  -tali d/c'd at 6am 8/3, approx 20cc voided       signed out to SICU Transfer Note:    Transfer from: SICU  Transfer to:  ( x ) Surgery    (  ) Telemetry    (  ) Medicine    (  ) Palliative    (  ) Stroke Unit    (  ) _______________      SICU COURSE:  58yo female with a history of HTN, HLD, GERD, AMADEO on CPAP, Obesity (BMI 40), hypothyroid (synthroid),  depression (sertraline), presents for elective paulie-en-y gastric bypass and hiatal hernia repair. Surgical history of Right inguinal hernia repair (2014), b/l breast augmentation, abdominal plasty.  Patient was a difficult intubation, attempt was made 2x and achieved with [facility] video laryngoscope.  Patient had a 2.5hr OR case with no acute events intra op. During the case patient BP range was 110-150s, no administration of pressors or antihypertensives. She received 1g APAP, 1800 LR and had UO of 650 via cesar. Patient was extubated with no acute complications in the PACU. She received decadron and zofran for nausea. Received Toradol q8 and morphine PCA for pain control. Hep 5000units Subq and SCD for DVT prophylaxis. Cefotetan antibiotic prophylaxis. Patient is transferred to ICU for overnight monitoring. overnight, pt given dose of benadryl overnight for itchiness.     PAST MEDICAL & SURGICAL HISTORY:  Overactive bladder  Gastroesophageal reflux disease with esophagitis  Obesity (BMI 35.0-39.9 without comorbidity)  Hypercholesteremia  Depressed  Hypothyroid  Hypertension  H/O arthroscopy of left knee: 20 yrs ago  H/O right inguinal hernia repair: 2014  H/O plastic surgery: abdominal plasty  H/O breast augmentation  Admission for tubal ligation    Allergies    erythromycin (Stomach Upset; Diarrhea; Flatulence)    Intolerances      MEDICATIONS  (STANDING):  heparin  Injectable 5000 Unit(s) SubCutaneous every 8 hours  lactated ringers. 1000 milliLiter(s) (125 mL/Hr) IV Continuous <Continuous>  levothyroxine Injectable 25 MICROGram(s) IV Push <User Schedule>  morphine PCA (5 mG/mL) 30 milliLiter(s) PCA Continuous PCA Continuous  ondansetron Injectable 4 milliGRAM(s) IV Push every 6 hours  pantoprazole  Injectable 40 milliGRAM(s) IV Push daily    MEDICATIONS  (PRN):  enalaprilat Injectable 1.25 milliGRAM(s) IV Push every 6 hours PRN systolic >140        Vital Signs Last 24 Hrs  T(C): 36.4 (03 Aug 2018 12:16), Max: 36.8 (03 Aug 2018 04:00)  T(F): 97.6 (03 Aug 2018 12:16), Max: 98.3 (03 Aug 2018 04:00)  HR: 51 (03 Aug 2018 12:16) (51 - 74)  BP: 141/66 (03 Aug 2018 12:16) (139/64 - 169/68)  BP(mean): 100 (03 Aug 2018 10:00) (89 - 127)  RR: 16 (03 Aug 2018 12:16) (7 - 35)  SpO2: 95% (03 Aug 2018 10:00) (93% - 99%)  I&O's Summary    02 Aug 2018 07:01  -  03 Aug 2018 07:00  --------------------------------------------------------  IN: 2200 mL / OUT: 3375 mL / NET: -1175 mL    03 Aug 2018 07:01  -  03 Aug 2018 12:42  --------------------------------------------------------  IN: 375 mL / OUT: 0 mL / NET: 375 mL        LABS                                            13.7                  Neurophils% (auto):   x      (08-03 @ 00:42):    9.13 )-----------(243          Lymphocytes% (auto):  x                                             40.0                   Eosinphils% (auto):   x        Manual%: Neutrophils x    ; Lymphocytes x    ; Eosinophils x    ; Bands%: x    ; Blasts x                                    141    |  99     |  6                   Calcium: 9.0   / iCa: x      (08-03 @ 00:42)    ----------------------------<  89        Magnesium: 1.7                              4.2     |  28     |  0.5              Phosphorous: 4.1              ASSESSMENT/PLAN: 57yFemale    57y Female s/p lap paulie-en-y, TAP block, hiatal hernia repair    PLAN:   Neurologic: Neuro intact, pain control with  PCA morphine, switch to crushed percocet and DC PCA when tolerating 3 cups  Respiratory: Stable on room air encourage ambulation, CPAP for sleep  Cardiovascular: enalapril   Gastrointestinal/Nutrition: bariatric protocol Zofran and Decadron for nausea, Protonix  Renal/Genitourinary: f/u tov, initial tov output only 20cc  Hematologic: Hep SubQ 5000u, SCD  Infectious Disease: Cefotetan periop  Lines/Tubes: Peripheral IV b/l  Endocrine: Hypothyroid synthroid IV 25 mcg  Disposition: downgrade to floor      Pt ambulating   -started on phase one of bariatric protocol, tolerating   -per fellows request, metoprolol changed to enalapril  -tali d/c'd at 6am 8/3, approx 20cc voided       signed out to dr zuñiga at 13:13

## 2018-08-03 NOTE — PROGRESS NOTE ADULT - SUBJECTIVE AND OBJECTIVE BOX
SLIME VICKI  [id number]    Indication for ICU admission: s/p lap paulie-en-y, TAP block, hiatal hernia repair  Admit Date:18  ICU Date: 18  OR Date: 18    58yo female with a history of HTN, HLD, GERD, AMADEO on CPAP, Obesity (BMI 40), hypothyroid (synthroid),  depression (sertraline), presents for elective paulie-en-y gastric bypass and hiatal hernia repair. Surgical history of Right inguinal hernia repair (), b/l breast augmentation, abdominal plasty.  Patient was a difficult intubation, attempt was made 2x and achieved with [facility] video laryngoscope.  Patient had a 2.5hr OR case with no acute events intra op. During the case patient BP range was 110-150s, no administration of pressors or antihypertensives. She received 1g APAP, 1800 LR and had UO of 650 via cesar. Patient was extubated with no acute complications in the PACU. She received decadron and zofran for nausea. Currently on Toradol q8 and morphine PCA for pain control. Hep 5000units Subq and SCD for DVT prophylaxis. Cefotetan antibiotic prophylaxis. Patient is resting comfortably in PACU and will be transferred to ICU for overnight monitoring.        erythromycin (Stomach Upset; Diarrhea; Flatulence)    PAST MEDICAL & SURGICAL HISTORY:  Overactive bladder  Gastroesophageal reflux disease with esophagitis  Obesity (BMI 35.0-39.9 without comorbidity)  Hypercholesteremia  Depressed  Hypothyroid  Hypertension  H/O arthroscopy of left knee: 20 yrs ago  H/O right inguinal hernia repair:   H/O plastic surgery: abdominal plasty  H/O breast augmentation  Admission for tubal ligation    Home Medications:  atorvastatin 20 mg oral tablet: 1 tab(s) orally once a day (02 Aug 2018 07:14)  biotin 5000 mcg oral tablet, disintegratin tab(s) orally once a day (02 Aug 2018 07:14)  Centrum Silver Ultra Women&#x27;s oral tablet: 1 tab(s) orally once a day (02 Aug 2018 07:14)  folic acid 1 mg oral tablet: 1 tab(s) orally once a day (02 Aug 2018 07:14)  levothyroxine 50 mcg (0.05 mg) oral tablet: 1 tab(s) orally once a day (02 Aug 2018 07:14)  losartan-hydroCHLOROthiazide 100 mg-25 mg oral tablet: 1 tab(s) orally once a day (02 Aug 2018 07:14)  Myrbetriq 50 mg oral tablet, extended release: 1 tab(s) orally once a day (02 Aug 2018 07:14)  omeprazole 40 mg oral delayed release capsule: 1 cap(s) orally once a day (02 Aug 2018 07:14)  sertraline 100 mg oral tablet: 1 tab(s) orally once a day (02 Aug 2018 07:14)  Vitamin C 500 mg oral tablet: 1 tab(s) orally once a day (02 Aug 2018 07:14)        24HRS EVENT:  Neurologic: Neuro intact, pain control with Toradol 3x, PCA morphine, dialudid, sertraline. given dose of benadryl overnight for itchiness, likely secondary to morphine  Respiratory: RA, ambulatied, CPAP for sleep, CXR tonight  Cardiovascular: Normotensive, holding home losartain-hctz, LR @ 125ml/hr, Lopressor 5 q6  Gastrointestinal/Nutrition: NPO, start bariatric protocol in AM, Zofran, Decadron for nausea, Protonix,  Renal/Genitourinary: Cesar, voiding urine, remove in 8/3 AM  Hematologic: Hep SubQ 5000u, SCD  Infectious Disease: Cefotetan  Lines/Tubes: Peripheral IV b/l  Endocrine: Hypothyroid synthroid IV 25 mcg qd   Labs: 2330 BMP, CBC, Mg, Phos  Disposition: ICU      DVT PTX:  Hep SubQ, SCD    GI PTX: Protonix    ***Tubes/Lines/Drains  ***  Peripheral IV_Bilateral             Urinary Catheter	Yes   Indication: Strict I&O    Date Placed: 18       REVIEW OF SYSTEMS    [X ] A ten-point review of systems was otherwise negative except as noted.  [ ] Due to altered mental status/intubation, subjective information were not able to be obtained from the patient. History was obtained, to the extent possible, from review of the chart and collateral sources of information. SLIME VICKI  [id number]    Indication for ICU admission: s/p lap paulie-en-y, TAP block, hiatal hernia repair  Admit Date:18  ICU Date: 18  OR Date: 18    56yo female with a history of HTN, HLD, GERD, AMADEO on CPAP, Obesity (BMI 40), hypothyroid (synthroid),  depression (sertraline), presents for elective paulie-en-y gastric bypass and hiatal hernia repair. Surgical history of Right inguinal hernia repair (), b/l breast augmentation, abdominal plasty.  Patient was a difficult intubation, attempt was made 2x and achieved with [facility] video laryngoscope.  Patient had a 2.5hr OR case with no acute events intra op. During the case patient BP range was 110-150s, no administration of pressors or antihypertensives. She received 1g APAP, 1800 LR and had UO of 650 via cesar. Patient was extubated with no acute complications in the PACU. She received decadron and zofran for nausea. Currently on Toradol q8 and morphine PCA for pain control. Hep 5000units Subq and SCD for DVT prophylaxis. Cefotetan antibiotic prophylaxis. Patient is resting comfortably in PACU and will be transferred to ICU for overnight monitoring.        erythromycin (Stomach Upset; Diarrhea; Flatulence)    PAST MEDICAL & SURGICAL HISTORY:  Overactive bladder  Gastroesophageal reflux disease with esophagitis  Obesity (BMI 35.0-39.9 without comorbidity)  Hypercholesteremia  Depressed  Hypothyroid  Hypertension  H/O arthroscopy of left knee: 20 yrs ago  H/O right inguinal hernia repair:   H/O plastic surgery: abdominal plasty  H/O breast augmentation  Admission for tubal ligation    Home Medications:  atorvastatin 20 mg oral tablet: 1 tab(s) orally once a day (02 Aug 2018 07:14)  biotin 5000 mcg oral tablet, disintegratin tab(s) orally once a day (02 Aug 2018 07:14)  Centrum Silver Ultra Women&#x27;s oral tablet: 1 tab(s) orally once a day (02 Aug 2018 07:14)  folic acid 1 mg oral tablet: 1 tab(s) orally once a day (02 Aug 2018 07:14)  levothyroxine 50 mcg (0.05 mg) oral tablet: 1 tab(s) orally once a day (02 Aug 2018 07:14)  losartan-hydroCHLOROthiazide 100 mg-25 mg oral tablet: 1 tab(s) orally once a day (02 Aug 2018 07:14)  Myrbetriq 50 mg oral tablet, extended release: 1 tab(s) orally once a day (02 Aug 2018 07:14)  omeprazole 40 mg oral delayed release capsule: 1 cap(s) orally once a day (02 Aug 2018 07:14)  sertraline 100 mg oral tablet: 1 tab(s) orally once a day (02 Aug 2018 07:14)  Vitamin C 500 mg oral tablet: 1 tab(s) orally once a day (02 Aug 2018 07:14)        24HRS EVENT:  Neurologic: Neuro intact, pain control with Toradol 3x, PCA morphine, dialudid, sertraline. given dose of benadryl overnight for itchiness, likely secondary to morphine  Respiratory: RA, ambulatied, CPAP for sleep, CXR tonight  Cardiovascular: Normotensive, holding home losartain-hctz, LR @ 125ml/hr, Lopressor 5 q6  Gastrointestinal/Nutrition: NPO, start bariatric protocol in AM, Zofran, Decadron for nausea, Protonix,  Renal/Genitourinary: Cesar, voiding urine, remove in 8/3 AM  Hematologic: Hep SubQ 5000u, SCD  Infectious Disease: Cefotetan  Lines/Tubes: Peripheral IV b/l  Endocrine: Hypothyroid synthroid IV 25 mcg qd   Labs: 2330 BMP, CBC, Mg, Phos  Disposition: ICU      DVT PTX:  Hep SubQ, SCD    GI PTX: Protonix    ***Tubes/Lines/Drains  ***  Peripheral IV_Bilateral             Urinary Catheter	Yes   Indication: Strict I&O    Date Placed: 18       REVIEW OF SYSTEMS    [X ] A ten-point review of systems was otherwise negative except as noted.  [ ] Due to altered mental status/intubation, subjective information were not able to be obtained from the patient. History was obtained, to the extent possible, from review of the chart and collateral sources of information.            Daily     Daily Weight in k (02 Aug 2018 19:04)    Diet, Clear Liquid:   Bariatric Clear Liquid (BARICLLIQ) (18 @ 06:31)      CURRENT MEDS:  Neurologic Medications  morphine PCA (5 mG/mL) 30 milliLiter(s) PCA Continuous PCA Continuous  ondansetron Injectable 4 milliGRAM(s) IV Push every 6 hours    Respiratory Medications    Cardiovascular Medications  enalaprilat Injectable 1.25 milliGRAM(s) IV Push every 6 hours PRN systolic >140    Gastrointestinal Medications  lactated ringers. 1000 milliLiter(s) IV Continuous <Continuous>  pantoprazole  Injectable 40 milliGRAM(s) IV Push daily    Genitourinary Medications    Hematologic/Oncologic Medications  heparin  Injectable 5000 Unit(s) SubCutaneous every 8 hours    Antimicrobial/Immunologic Medications    Endocrine/Metabolic Medications  levothyroxine Injectable 25 MICROGram(s) IV Push <User Schedule>    Topical/Other Medications      ICU Vital Signs Last 24 Hrs  T(C): 36.8 (03 Aug 2018 04:00), Max: 36.8 (03 Aug 2018 04:00)  T(F): 98.3 (03 Aug 2018 04:00), Max: 98.3 (03 Aug 2018 04:00)  HR: 58 (03 Aug 2018 06:30) (52 - 74)  BP: 153/70 (03 Aug 2018 06:30) (139/64 - 169/68)  BP(mean): 103 (03 Aug 2018 06:30) (89 - 127)  ABP: --  ABP(mean): --  RR: 13 (03 Aug 2018 06:30) (7 - 35)  SpO2: 96% (03 Aug 2018 06:30) (93% - 99%)      Adult Advanced Hemodynamics Last 24 Hrs  CVP(mm Hg): --  CVP(cm H2O): --  CO: --  CI: --  PA: --  PA(mean): --  PCWP: --  SVR: --  SVRI: --  PVR: --  PVRI: --          I&O's Summary    02 Aug 2018 07:  -  03 Aug 2018 07:00  --------------------------------------------------------  IN: 2200 mL / OUT: 3375 mL / NET: -1175 mL      I&O's Detail    02 Aug 2018 07:01  -  03 Aug 2018 07:00  --------------------------------------------------------  IN:    IV PiggyBack: 100 mL    lactated ringers.: 100 mL    lactated ringers.: 2000 mL  Total IN: 2200 mL    OUT:    Indwelling Catheter - Urethral: 3375 mL  Total OUT: 3375 mL    Total NET: -1175 mL          PHYSICAL EXAM:    General/NeuroNAD  RASS:0             GCS:     = E 4  / V 5  / M 6     Deficits:                             alert & oriented x 3, no focal deficits  Pupils: Reactive    Lungs:      clear to auscultation, Normal expansion/effort.     Cardiovascular : S1, S2.  Regular rate and rhythm.  no Peripheral edema   Cardiac Rhythm: Normal Sinus Rhythm    GI: Abdomen soft, mild tenderness around     Extremities: Extremities warm, pink, well-perfused. Pulses    Derm: Good skin turgor, no skin breakdown.      :       Cesar catheter DCd, pending TOV      Imaging:     LABS:  CAPILLARY BLOOD GLUCOSE                              13.7   9.13  )-----------( 243      ( 03 Aug 2018 00:42 )             40.0       08-03    141  |  99  |  6<L>  ----------------------------<  89  4.2   |  28  |  0.5<L>    Ca    9.0      03 Aug 2018 00:42  Phos  4.1     08-03  Mg     1.7     -

## 2018-08-03 NOTE — CHART NOTE - NSCHARTNOTEFT_GEN_A_CORE
58 YO F s/p lap RYGB - POD #1.  Tolerating sonali clear liquid diet well at 2 oz/hr.  Has protein shakes and chewable vitamins and minerals at home.  Patient verbalized understanding of sonali phase I diet to begin upon discharge.  DROP sheet reviewed with patient - all questions answered.  Will follow up in office next week.  Call [phone] with questions/concerns.

## 2018-08-04 ENCOUNTER — TRANSCRIPTION ENCOUNTER (OUTPATIENT)
Age: 58
End: 2018-08-04

## 2018-08-04 VITALS
SYSTOLIC BLOOD PRESSURE: 148 MMHG | HEART RATE: 58 BPM | TEMPERATURE: 98 F | DIASTOLIC BLOOD PRESSURE: 72 MMHG | RESPIRATION RATE: 18 BRPM

## 2018-08-04 LAB
ANION GAP SERPL CALC-SCNC: 15 MMOL/L — HIGH (ref 7–14)
BASOPHILS # BLD AUTO: 0.03 K/UL — SIGNIFICANT CHANGE UP (ref 0–0.2)
BASOPHILS NFR BLD AUTO: 0.3 % — SIGNIFICANT CHANGE UP (ref 0–1)
BUN SERPL-MCNC: 5 MG/DL — LOW (ref 10–20)
CALCIUM SERPL-MCNC: 9 MG/DL — SIGNIFICANT CHANGE UP (ref 8.5–10.1)
CHLORIDE SERPL-SCNC: 95 MMOL/L — LOW (ref 98–110)
CO2 SERPL-SCNC: 27 MMOL/L — SIGNIFICANT CHANGE UP (ref 17–32)
CREAT SERPL-MCNC: 0.5 MG/DL — LOW (ref 0.7–1.5)
EOSINOPHIL # BLD AUTO: 0.07 K/UL — SIGNIFICANT CHANGE UP (ref 0–0.7)
EOSINOPHIL NFR BLD AUTO: 0.6 % — SIGNIFICANT CHANGE UP (ref 0–8)
GLUCOSE SERPL-MCNC: 89 MG/DL — SIGNIFICANT CHANGE UP (ref 70–99)
HCT VFR BLD CALC: 36.9 % — LOW (ref 37–47)
HGB BLD-MCNC: 12.6 G/DL — SIGNIFICANT CHANGE UP (ref 12–16)
IMM GRANULOCYTES NFR BLD AUTO: 0.3 % — SIGNIFICANT CHANGE UP (ref 0.1–0.3)
LYMPHOCYTES # BLD AUTO: 1.49 K/UL — SIGNIFICANT CHANGE UP (ref 1.2–3.4)
LYMPHOCYTES # BLD AUTO: 12.7 % — LOW (ref 20.5–51.1)
MAGNESIUM SERPL-MCNC: 1.9 MG/DL — SIGNIFICANT CHANGE UP (ref 1.8–2.4)
MCHC RBC-ENTMCNC: 28.8 PG — SIGNIFICANT CHANGE UP (ref 27–31)
MCHC RBC-ENTMCNC: 34.1 G/DL — SIGNIFICANT CHANGE UP (ref 32–37)
MCV RBC AUTO: 84.4 FL — SIGNIFICANT CHANGE UP (ref 81–99)
MONOCYTES # BLD AUTO: 0.84 K/UL — HIGH (ref 0.1–0.6)
MONOCYTES NFR BLD AUTO: 7.2 % — SIGNIFICANT CHANGE UP (ref 1.7–9.3)
NEUTROPHILS # BLD AUTO: 9.27 K/UL — HIGH (ref 1.4–6.5)
NEUTROPHILS NFR BLD AUTO: 78.9 % — HIGH (ref 42.2–75.2)
PHOSPHATE SERPL-MCNC: 2.4 MG/DL — SIGNIFICANT CHANGE UP (ref 2.1–4.9)
PLATELET # BLD AUTO: 261 K/UL — SIGNIFICANT CHANGE UP (ref 130–400)
POTASSIUM SERPL-MCNC: 4.2 MMOL/L — SIGNIFICANT CHANGE UP (ref 3.5–5)
POTASSIUM SERPL-SCNC: 4.2 MMOL/L — SIGNIFICANT CHANGE UP (ref 3.5–5)
RBC # BLD: 4.37 M/UL — SIGNIFICANT CHANGE UP (ref 4.2–5.4)
RBC # FLD: 12.3 % — SIGNIFICANT CHANGE UP (ref 11.5–14.5)
SODIUM SERPL-SCNC: 137 MMOL/L — SIGNIFICANT CHANGE UP (ref 135–146)
WBC # BLD: 11.74 K/UL — HIGH (ref 4.8–10.8)
WBC # FLD AUTO: 11.74 K/UL — HIGH (ref 4.8–10.8)

## 2018-08-04 RX ORDER — ACETAMINOPHEN WITH CODEINE 300MG-30MG
1 TABLET ORAL EVERY 6 HOURS
Qty: 0 | Refills: 0 | Status: DISCONTINUED | OUTPATIENT
Start: 2018-08-04 | End: 2018-08-04

## 2018-08-04 RX ORDER — LOSARTAN POTASSIUM 100 MG/1
100 TABLET, FILM COATED ORAL ONCE
Qty: 0 | Refills: 0 | Status: COMPLETED | OUTPATIENT
Start: 2018-08-04 | End: 2018-08-04

## 2018-08-04 RX ORDER — LEVOTHYROXINE SODIUM 125 MCG
50 TABLET ORAL DAILY
Qty: 0 | Refills: 0 | Status: DISCONTINUED | OUTPATIENT
Start: 2018-08-04 | End: 2018-08-04

## 2018-08-04 RX ORDER — ACETAMINOPHEN WITH CODEINE 300MG-30MG
1 TABLET ORAL
Qty: 0 | Refills: 0 | DISCHARGE
Start: 2018-08-04

## 2018-08-04 RX ORDER — KETOROLAC TROMETHAMINE 30 MG/ML
15 SYRINGE (ML) INJECTION EVERY 8 HOURS
Qty: 0 | Refills: 0 | Status: DISCONTINUED | OUTPATIENT
Start: 2018-08-04 | End: 2018-08-04

## 2018-08-04 RX ADMIN — PANTOPRAZOLE SODIUM 40 MILLIGRAM(S): 20 TABLET, DELAYED RELEASE ORAL at 14:47

## 2018-08-04 RX ADMIN — Medication 1 TABLET(S): at 14:46

## 2018-08-04 RX ADMIN — HEPARIN SODIUM 5000 UNIT(S): 5000 INJECTION INTRAVENOUS; SUBCUTANEOUS at 14:46

## 2018-08-04 RX ADMIN — LOSARTAN POTASSIUM 100 MILLIGRAM(S): 100 TABLET, FILM COATED ORAL at 16:32

## 2018-08-04 RX ADMIN — Medication 1.25 MILLIGRAM(S): at 03:05

## 2018-08-04 RX ADMIN — ONDANSETRON 4 MILLIGRAM(S): 8 TABLET, FILM COATED ORAL at 05:46

## 2018-08-04 RX ADMIN — ONDANSETRON 4 MILLIGRAM(S): 8 TABLET, FILM COATED ORAL at 14:46

## 2018-08-04 RX ADMIN — HEPARIN SODIUM 5000 UNIT(S): 5000 INJECTION INTRAVENOUS; SUBCUTANEOUS at 05:47

## 2018-08-04 RX ADMIN — Medication 15 MILLIGRAM(S): at 04:19

## 2018-08-04 NOTE — DISCHARGE NOTE ADULT - CARE PLAN
Principal Discharge DX:	Obesity (BMI 35.0-39.9 without comorbidity)  Goal:	improved BMI  Assessment and plan of treatment:	morbid obesity  s/p laparoscopic gastric by pass Principal Discharge DX:	Obesity (BMI 35.0-39.9 without comorbidity)  Goal:	improve BMI  Assessment and plan of treatment:	morbid obesity  s/p laparoscopic gastric by pass paulie en Y gastroenterostomy with hiatal hernia repair

## 2018-08-04 NOTE — PROGRESS NOTE ADULT - SUBJECTIVE AND OBJECTIVE BOX
Progress Note: General Surgery  Patient: SLIME COHEN , 57y (1960)Female   MRN: [id number]  Location: [address]  Visit: 08-02-18 Inpatient  Date: 08-04-18 @ 04:40  Hospital Day: 3  Post-op Day: 2    Procedure/Diagnosis: s/p laparoscopic RNY gastric bypass w/ hiatal hernia repair    Events/ 24h: 120cc of clear vomitus at 4:30 am. Otherwise, pain controlled, pt has been ambulating frequently, voiding, and has reached the 2nd to last row of 4 cups on sonali clears protocol with frequent encouragement    Vitals: T(F): 98.8 (08-04-18 @ 04:00), Max: 98.9 (08-04-18 @ 03:41)  HR: 62 (08-04-18 @ 04:00)  BP: 186/87 (08-04-18 @ 04:00) (141/66 - 189/79)  RR: 16 (08-04-18 @ 04:00)  SpO2: 93% (08-04-18 @ 03:41)    In:   08-02-18 @ 07:01  -  08-03-18 @ 07:00  --------------------------------------------------------  IN: 2200 mL    08-03-18 @ 07:01  -  08-04-18 @ 04:40  --------------------------------------------------------  IN: 375 mL      Out:   08-02-18 @ 07:01  -  08-03-18 @ 07:00  --------------------------------------------------------  OUT:    Indwelling Catheter - Urethral: 3375 mL  Total OUT: 3375 mL      08-03-18 @ 07:01  -  08-04-18 @ 04:40  --------------------------------------------------------  OUT:    Emesis: 140 mL    Voided: 930 mL  Total OUT: 1070 mL      Net:   08-02-18 @ 07:01  -  08-03-18 @ 07:00  --------------------------------------------------------  NET: -1175 mL    08-03-18 @ 07:01  -  08-04-18 @ 04:40  --------------------------------------------------------  NET: -695 mL      Diet: Diet, Clear Liquid:   Bariatric Clear Liquid (BARICLLIQ) (08-03-18 @ 06:31)    IV Fluids: folic acid 1 milliGRAM(s) Oral daily      Physical Examination:  General Appearance: pt somewhat nauseous  HEENT: EOMI, sclera non-icteric.  Heart: RRR   Lungs: CTABL.   Abdomen:  Trochar sited c/d/i. Soft, nontender, nondistended. Obese. No rigidity, guarding, or rebound tenderness.   MSK/Extremities: Warm & well-perfused. No significant deformity or joint abnormality. Peripheral pulses intact.  Skin: Warm, dry. No jaundice.       Medications: [Standing]  atorvastatin 20 milliGRAM(s) Oral at bedtime  folic acid 1 milliGRAM(s) Oral daily  heparin  Injectable 5000 Unit(s) SubCutaneous every 8 hours  levothyroxine Injectable 25 MICROGram(s) IV Push <User Schedule>  losartan 100 milliGRAM(s) Oral daily  ondansetron Injectable 4 milliGRAM(s) IV Push every 6 hours  pantoprazole  Injectable 40 milliGRAM(s) IV Push daily  sertraline 100 milliGRAM(s) Oral daily    DVT Prophylaxis: heparin  Injectable 5000 Unit(s) SubCutaneous every 8 hours    GI Prophylaxis: pantoprazole  Injectable 40 milliGRAM(s) IV Push daily    Antibiotics:   Anticoagulation:   Medications:[PRN]  enalaprilat Injectable 1.25 milliGRAM(s) IV Push every 6 hours PRN  ketorolac   Injectable 15 milliGRAM(s) IV Push every 8 hours PRN  oxyCODONE    5 mG/acetaminophen 325 mG 1 Tablet(s) Oral every 6 hours PRN      Labs:                        12.6   11.74 )-----------( 261      ( 04 Aug 2018 01:30 )             36.9     08-04    137  |  95<L>  |  5<L>  ----------------------------<  89  4.2   |  27  |  0.5<L>    Ca    9.0      04 Aug 2018 01:30  Phos  2.4     08-04  Mg     1.9     08-04        Assessment:  57y Female patient admitted S/P laparoscopic RNY gastric bypass w/ hiatal hernia repair      Plan:  encourage ambulation & give zofran for nausea  OOB  IS  Pain control currently with torodol 15 x3 doses PRN. While this is usually given on POD 0 per sonali protocol, pt was not amenable to crushed percocets and adjustment was cleared with sonali fellow.  Possible d/c today    Date/Time: 08-04-18 @ 04:40 Progress Note: General Surgery  Patient: SLIME COHEN , 57y (1960)Female   MRN: [id number]  Location: [address]  Visit: 08-02-18 Inpatient  Date: 08-04-18 @ 04:40  Hospital Day: 3  Post-op Day: 2    Procedure/Diagnosis: s/p laparoscopic RNY gastric bypass w/ hiatal hernia repair    Events/ 24h: Initially tolerating diet , with no issues, was ambulating and voiding.   120cc of clear vomitus at 4:30 am. Otherwise, pain controlled, pt has been ambulating frequently, voiding, and has reached the 2nd to last row of 4 cups on sonali clears protocol with frequent encouragement    Vitals: T(F): 98.8 (08-04-18 @ 04:00), Max: 98.9 (08-04-18 @ 03:41)  HR: 62 (08-04-18 @ 04:00)  BP: 186/87 (08-04-18 @ 04:00) (141/66 - 189/79)  RR: 16 (08-04-18 @ 04:00)  SpO2: 93% (08-04-18 @ 03:41)    In:   08-02-18 @ 07:01  -  08-03-18 @ 07:00  --------------------------------------------------------  IN: 2200 mL    08-03-18 @ 07:01  -  08-04-18 @ 04:40  --------------------------------------------------------  IN: 375 mL      Out:   08-02-18 @ 07:01  -  08-03-18 @ 07:00  --------------------------------------------------------  OUT:    Indwelling Catheter - Urethral: 3375 mL  Total OUT: 3375 mL      08-03-18 @ 07:01  -  08-04-18 @ 04:40  --------------------------------------------------------  OUT:    Emesis: 140 mL    Voided: 930 mL  Total OUT: 1070 mL      Net:   08-02-18 @ 07:01  -  08-03-18 @ 07:00  --------------------------------------------------------  NET: -1175 mL    08-03-18 @ 07:01  -  08-04-18 @ 04:40  --------------------------------------------------------  NET: -695 mL      Diet: Diet, Clear Liquid:   Bariatric Clear Liquid (BARICLLIQ) (08-03-18 @ 06:31)    IV Fluids: folic acid 1 milliGRAM(s) Oral daily      Physical Examination:  General Appearance: pt somewhat nauseous  HEENT: EOMI, sclera non-icteric.  Heart: RRR   Lungs: CTABL.   Abdomen:  Trochar sited c/d/i. Soft, nontender, nondistended. Obese. No rigidity, guarding, or rebound tenderness.   MSK/Extremities: Warm & well-perfused. No significant deformity or joint abnormality. Peripheral pulses intact.  Skin: Warm, dry. No jaundice.       Medications: [Standing]  atorvastatin 20 milliGRAM(s) Oral at bedtime  folic acid 1 milliGRAM(s) Oral daily  heparin  Injectable 5000 Unit(s) SubCutaneous every 8 hours  levothyroxine Injectable 25 MICROGram(s) IV Push <User Schedule>  losartan 100 milliGRAM(s) Oral daily  ondansetron Injectable 4 milliGRAM(s) IV Push every 6 hours  pantoprazole  Injectable 40 milliGRAM(s) IV Push daily  sertraline 100 milliGRAM(s) Oral daily    DVT Prophylaxis: heparin  Injectable 5000 Unit(s) SubCutaneous every 8 hours    GI Prophylaxis: pantoprazole  Injectable 40 milliGRAM(s) IV Push daily    Antibiotics:   Anticoagulation:   Medications:[PRN]  enalaprilat Injectable 1.25 milliGRAM(s) IV Push every 6 hours PRN  ketorolac   Injectable 15 milliGRAM(s) IV Push every 8 hours PRN  oxyCODONE    5 mG/acetaminophen 325 mG 1 Tablet(s) Oral every 6 hours PRN      Labs:                        12.6   11.74 )-----------( 261      ( 04 Aug 2018 01:30 )             36.9     08-04    137  |  95<L>  |  5<L>  ----------------------------<  89  4.2   |  27  |  0.5<L>    Ca    9.0      04 Aug 2018 01:30  Phos  2.4     08-04  Mg     1.9     08-04        Assessment:  57y Female patient admitted S/P laparoscopic RNY gastric bypass w/ hiatal hernia repair      Plan:  encourage ambulation & give zofran for nausea  OOB  IS  Pain control currently with torodol 15 x3 doses PRN. While this is usually given on POD 0 per sonali protocol, pt was not amenable to crushed percocets and adjustment was cleared with sonali fellow.  Possible d/c today    Date/Time: 08-04-18 @ 04:40

## 2018-08-04 NOTE — DISCHARGE NOTE ADULT - ADDITIONAL INSTRUCTIONS
follow up with Dr Olvera as scheduled  bariatric precaution follow up with Dr Olvera as scheduled  follow bariatric surgery instructions and precautions  if experience fever, shortness of breath, chest pain, dizziness, , vomiting or excessive abdomina pain call MD or return to ED

## 2018-08-04 NOTE — DISCHARGE NOTE ADULT - PATIENT PORTAL LINK FT
You can access the [facility] Patient Portal, offered by [facility], by registering with the following website: [email or web address]

## 2018-08-04 NOTE — DISCHARGE NOTE ADULT - PLAN OF CARE
improved BMI morbid obesity  s/p laparoscopic gastric by pass improve BMI morbid obesity  s/p laparoscopic gastric by pass paulie en Y gastroenterostomy with hiatal hernia repair

## 2018-08-04 NOTE — CHART NOTE - NSCHARTNOTEFT_GEN_A_CORE
pt without acute acute complaints. tolerated diet and ambulated. vital signs stable and afebrile.  doing well and pt wants to go home. per DR Olvera pt to be discharged home today. advised to follow up with Dr Olvera as scheduled . resume home medications per bariatric surgery.  precaution provided. Risks and benefits explained and all questions answered @ this time. pt showed understandings

## 2018-08-04 NOTE — DISCHARGE NOTE ADULT - MEDICATION SUMMARY - MEDICATIONS TO TAKE
I will START or STAY ON the medications listed below when I get home from the hospital:    acetaminophen-codeine 300 mg-30 mg oral tablet  -- 1 tab(s) by mouth every 6 hours, As needed, Moderate Pain (4 - 6)  -- Indication: For pain PRN    sertraline 100 mg oral tablet  -- 1 tab(s) by mouth once a day  -- Indication: For per bariatric surgery    atorvastatin 20 mg oral tablet  -- 1 tab(s) by mouth once a day  -- Indication: For per bariatric surgery    losartan-hydroCHLOROthiazide 100 mg-25 mg oral tablet  -- 1 tab(s) by mouth once a day  -- Indication: For per bariatric surgery    omeprazole 40 mg oral delayed release capsule  -- 1 cap(s) by mouth once a day  -- Indication: For per bariatric surgery    levothyroxine 50 mcg (0.05 mg) oral tablet  -- 1 tab(s) by mouth once a day  -- Indication: For per bariatric surgery    Myrbetriq 50 mg oral tablet, extended release  -- 1 tab(s) by mouth once a day  -- Indication: For per bariatric surgery    Centrum Silver Ultra Women's oral tablet  -- 1 tab(s) by mouth once a day  -- Indication: For per bariatric surgery    Vitamin C 500 mg oral tablet  -- 1 tab(s) by mouth once a day  -- Indication: For per bariatric surgery    biotin 5000 mcg oral tablet, disintegrating  -- 1 tab(s) by mouth once a day  -- Indication: For per bariatric surgery    folic acid 1 mg oral tablet  -- 1 tab(s) by mouth once a day  -- Indication: For per bariatric surgery

## 2018-08-04 NOTE — DISCHARGE NOTE ADULT - HOSPITAL COURSE
This is a 56 y/o female with morbid obesity, presents to [facility] for elective surgery.  pt had Gastric bypass, laparoscopic, using Franky-en-Y gastroenterostomy, with hiatal hernia repair   Post op pt admitted to SICU for close monitoring and then downgraded to floor. pt tolerated bariatric diet protocol and ambulated well.   on 8/4/18, pt without acute complaints. tolerated diet and ambulated. vital signs stable and afebrile. Per Dr Olvera , pt discharged home in stable condition . pt advised to follow up with Dr Olvera as scheduled and follow bariatric instructions and precaution. resume home medications per Bariatric surgery. This is a 56 y/o female with morbid obesity, HTN, HLD, GERD, AMADEO on CPAP, hypothyroid, depression presents to [facility] for elective surgery.  pt had Gastric bypass, laparoscopic, using Franky-en-Y gastroenterostomy, with hiatal hernia repair   Post op pt admitted to SICU for close monitoring and then downgraded to floor. pt tolerated bariatric diet protocol and ambulated well.   on 8/4/18, pt without acute complaints. tolerated diet and ambulated. vital signs stable and afebrile. Per Dr Olvera , pt discharged home in stable condition . pt advised to follow up with Dr Olvera as scheduled and follow bariatric instructions and precaution. resume home medications per Bariatric surgery. This is a 58 y/o female with morbid obesity, HTN, HLD, GERD, AMADEO on CPAP, hypothyroid, depression presents to [facility] for elective surgery.  pt had Gastric bypass, laparoscopic, using Franky-en-Y gastroenterostomy, with hiatal hernia repair   Post op pt admitted to SICU for close monitoring and then downgraded to floor. pt tolerated bariatric diet protocol and ambulated well.   on 8/4/18, pt without acute complaints. tolerated diet and ambulated. vital signs stable and afebrile. Per Dr Olvera , pt discharged home in stable condition . pt advised to follow up with Dr Olvera as scheduled and follow bariatric surgery  instructions and precaution. resume home medications per Bariatric surgery. This is a 56 y/o female with morbid obesity, HTN, HLD, GERD, AMADEO on CPAP, hypothyroid, depression presents to [facility] for elective surgery.  pt had Gastric bypass, laparoscopic, using Franky-en-Y gastroenterostomy, with hiatal hernia repair   Post op pt admitted to SICU for close monitoring and then downgraded to floor. pt treated medically.  on 8/4/18, pt without acute complaints. tolerated diet and ambulated. vital signs stable and afebrile. Per Dr Olvera , pt discharged home in stable condition . pt advised to follow up with Dr Olvera as scheduled and follow bariatric surgery  instructions and precaution. resume home medications per Bariatric surgery.

## 2018-08-04 NOTE — PROGRESS NOTE ADULT - ATTENDING COMMENTS
s/p Franky en Y Gastric Bypass and Hiatal Hernia repair.   She is doing well.   She is ambulating and has voided.   She was tolerating liquid diet and had almost no pain.   She did swallow a pill last night around 10 pm and has since vomited once around 4 am .   This morning she feels well and states she wants to go home.   We will check for tolerance of feeds, before we send her home again.   We reinforced that she needs to crush all her pills before she swallows them .
doing well Po   tolerating clears   OOB and ambulate   resume home meds   Transfer to floor.

## 2018-08-04 NOTE — DISCHARGE NOTE ADULT - CARE PROVIDER_API CALL
Janet Olvera), Surgery  [address] [place] [address]  [address]rd [place] [address]  Phone: [phone]  Fax: [phone]

## 2018-08-06 ENCOUNTER — CLINICAL ADVICE (OUTPATIENT)
Age: 58
End: 2018-08-06

## 2018-08-06 ENCOUNTER — MESSAGE (OUTPATIENT)
Age: 58
End: 2018-08-06

## 2018-08-08 ENCOUNTER — APPOINTMENT (OUTPATIENT)
Dept: SURGERY | Facility: CLINIC | Age: 58
End: 2018-08-08
Payer: MEDICAID

## 2018-08-08 VITALS — WEIGHT: 239.4 LBS | BODY MASS INDEX: 39.41 KG/M2 | HEIGHT: 65.5 IN

## 2018-08-08 PROBLEM — K21.0 GASTRO-ESOPHAGEAL REFLUX DISEASE WITH ESOPHAGITIS: Chronic | Status: ACTIVE | Noted: 2018-07-20

## 2018-08-08 PROBLEM — N32.81 OVERACTIVE BLADDER: Chronic | Status: ACTIVE | Noted: 2018-07-20

## 2018-08-08 PROCEDURE — 99024 POSTOP FOLLOW-UP VISIT: CPT

## 2018-08-08 RX ORDER — OXYCODONE AND ACETAMINOPHEN 5; 325 MG/1; MG/1
5-325 TABLET ORAL EVERY 6 HOURS
Qty: 15 | Refills: 0 | Status: DISCONTINUED | COMMUNITY
Start: 2018-07-25 | End: 2018-08-08

## 2018-08-09 DIAGNOSIS — E66.01 MORBID (SEVERE) OBESITY DUE TO EXCESS CALORIES: ICD-10-CM

## 2018-08-09 DIAGNOSIS — G47.33 OBSTRUCTIVE SLEEP APNEA (ADULT) (PEDIATRIC): ICD-10-CM

## 2018-08-09 DIAGNOSIS — Z79.52 LONG TERM (CURRENT) USE OF SYSTEMIC STEROIDS: ICD-10-CM

## 2018-08-09 DIAGNOSIS — Z98.51 TUBAL LIGATION STATUS: ICD-10-CM

## 2018-08-09 DIAGNOSIS — E78.5 HYPERLIPIDEMIA, UNSPECIFIED: ICD-10-CM

## 2018-08-09 DIAGNOSIS — Z88.1 ALLERGY STATUS TO OTHER ANTIBIOTIC AGENTS STATUS: ICD-10-CM

## 2018-08-09 DIAGNOSIS — E03.9 HYPOTHYROIDISM, UNSPECIFIED: ICD-10-CM

## 2018-08-09 DIAGNOSIS — F32.9 MAJOR DEPRESSIVE DISORDER, SINGLE EPISODE, UNSPECIFIED: ICD-10-CM

## 2018-08-09 DIAGNOSIS — K21.9 GASTRO-ESOPHAGEAL REFLUX DISEASE WITHOUT ESOPHAGITIS: ICD-10-CM

## 2018-08-09 DIAGNOSIS — I10 ESSENTIAL (PRIMARY) HYPERTENSION: ICD-10-CM

## 2018-08-09 DIAGNOSIS — K44.9 DIAPHRAGMATIC HERNIA WITHOUT OBSTRUCTION OR GANGRENE: ICD-10-CM

## 2018-08-09 DIAGNOSIS — N32.81 OVERACTIVE BLADDER: ICD-10-CM

## 2018-08-09 DIAGNOSIS — M19.90 UNSPECIFIED OSTEOARTHRITIS, UNSPECIFIED SITE: ICD-10-CM

## 2018-08-10 ENCOUNTER — APPOINTMENT (OUTPATIENT)
Dept: SURGERY | Facility: CLINIC | Age: 58
End: 2018-08-10
Payer: MEDICAID

## 2018-08-10 VITALS
WEIGHT: 232.4 LBS | DIASTOLIC BLOOD PRESSURE: 82 MMHG | SYSTOLIC BLOOD PRESSURE: 158 MMHG | BODY MASS INDEX: 38.25 KG/M2 | HEIGHT: 65.5 IN

## 2018-08-10 PROCEDURE — 99024 POSTOP FOLLOW-UP VISIT: CPT

## 2018-08-17 ENCOUNTER — APPOINTMENT (OUTPATIENT)
Dept: SURGERY | Facility: CLINIC | Age: 58
End: 2018-08-17
Payer: MEDICAID

## 2018-08-17 VITALS — WEIGHT: 228.6 LBS | BODY MASS INDEX: 37.63 KG/M2 | HEIGHT: 65.5 IN

## 2018-08-17 PROCEDURE — 99024 POSTOP FOLLOW-UP VISIT: CPT

## 2018-08-28 ENCOUNTER — EMERGENCY (EMERGENCY)
Facility: HOSPITAL | Age: 58
LOS: 0 days | Discharge: HOME | End: 2018-08-29
Attending: EMERGENCY MEDICINE | Admitting: EMERGENCY MEDICINE

## 2018-08-28 VITALS
RESPIRATION RATE: 18 BRPM | HEART RATE: 83 BPM | SYSTOLIC BLOOD PRESSURE: 134 MMHG | TEMPERATURE: 98 F | OXYGEN SATURATION: 97 % | DIASTOLIC BLOOD PRESSURE: 62 MMHG

## 2018-08-28 DIAGNOSIS — Z98.890 OTHER SPECIFIED POSTPROCEDURAL STATES: Chronic | ICD-10-CM

## 2018-08-28 DIAGNOSIS — Z79.84 LONG TERM (CURRENT) USE OF ORAL HYPOGLYCEMIC DRUGS: ICD-10-CM

## 2018-08-28 DIAGNOSIS — Z88.1 ALLERGY STATUS TO OTHER ANTIBIOTIC AGENTS STATUS: ICD-10-CM

## 2018-08-28 DIAGNOSIS — Z79.899 OTHER LONG TERM (CURRENT) DRUG THERAPY: ICD-10-CM

## 2018-08-28 DIAGNOSIS — Z98.82 BREAST IMPLANT STATUS: Chronic | ICD-10-CM

## 2018-08-28 DIAGNOSIS — K21.9 GASTRO-ESOPHAGEAL REFLUX DISEASE WITHOUT ESOPHAGITIS: ICD-10-CM

## 2018-08-28 DIAGNOSIS — F32.9 MAJOR DEPRESSIVE DISORDER, SINGLE EPISODE, UNSPECIFIED: ICD-10-CM

## 2018-08-28 DIAGNOSIS — Z30.2 ENCOUNTER FOR STERILIZATION: Chronic | ICD-10-CM

## 2018-08-28 DIAGNOSIS — Z98.51 TUBAL LIGATION STATUS: ICD-10-CM

## 2018-08-28 DIAGNOSIS — R10.31 RIGHT LOWER QUADRANT PAIN: ICD-10-CM

## 2018-08-28 DIAGNOSIS — E78.00 PURE HYPERCHOLESTEROLEMIA, UNSPECIFIED: ICD-10-CM

## 2018-08-28 DIAGNOSIS — I10 ESSENTIAL (PRIMARY) HYPERTENSION: ICD-10-CM

## 2018-08-28 DIAGNOSIS — R10.9 UNSPECIFIED ABDOMINAL PAIN: ICD-10-CM

## 2018-08-28 DIAGNOSIS — E03.9 HYPOTHYROIDISM, UNSPECIFIED: ICD-10-CM

## 2018-08-29 VITALS
RESPIRATION RATE: 20 BRPM | DIASTOLIC BLOOD PRESSURE: 72 MMHG | SYSTOLIC BLOOD PRESSURE: 134 MMHG | HEART RATE: 81 BPM | TEMPERATURE: 98 F

## 2018-08-29 LAB
ALBUMIN SERPL ELPH-MCNC: 4.2 G/DL — SIGNIFICANT CHANGE UP (ref 3.5–5.2)
ALP SERPL-CCNC: 69 U/L — SIGNIFICANT CHANGE UP (ref 30–115)
ALT FLD-CCNC: 25 U/L — SIGNIFICANT CHANGE UP (ref 0–41)
ANION GAP SERPL CALC-SCNC: 15 MMOL/L — HIGH (ref 7–14)
APPEARANCE UR: ABNORMAL
APTT BLD: 32.4 SEC — SIGNIFICANT CHANGE UP (ref 27–39.2)
AST SERPL-CCNC: 27 U/L — SIGNIFICANT CHANGE UP (ref 0–41)
BASE EXCESS BLDV CALC-SCNC: 5.5 MMOL/L — HIGH (ref -2–2)
BASOPHILS # BLD AUTO: 0.03 K/UL — SIGNIFICANT CHANGE UP (ref 0–0.2)
BASOPHILS NFR BLD AUTO: 0.4 % — SIGNIFICANT CHANGE UP (ref 0–1)
BILIRUB SERPL-MCNC: 0.7 MG/DL — SIGNIFICANT CHANGE UP (ref 0.2–1.2)
BILIRUB UR-MCNC: NEGATIVE — SIGNIFICANT CHANGE UP
BLD GP AB SCN SERPL QL: SIGNIFICANT CHANGE UP
BUN SERPL-MCNC: 11 MG/DL — SIGNIFICANT CHANGE UP (ref 10–20)
CA-I SERPL-SCNC: 1.12 MMOL/L — SIGNIFICANT CHANGE UP (ref 1.12–1.3)
CALCIUM SERPL-MCNC: 9.3 MG/DL — SIGNIFICANT CHANGE UP (ref 8.5–10.1)
CHLORIDE SERPL-SCNC: 100 MMOL/L — SIGNIFICANT CHANGE UP (ref 98–110)
CO2 SERPL-SCNC: 25 MMOL/L — SIGNIFICANT CHANGE UP (ref 17–32)
COLOR SPEC: YELLOW — SIGNIFICANT CHANGE UP
CREAT SERPL-MCNC: 0.6 MG/DL — LOW (ref 0.7–1.5)
DIFF PNL FLD: NEGATIVE — SIGNIFICANT CHANGE UP
EOSINOPHIL # BLD AUTO: 0.12 K/UL — SIGNIFICANT CHANGE UP (ref 0–0.7)
EOSINOPHIL NFR BLD AUTO: 1.8 % — SIGNIFICANT CHANGE UP (ref 0–8)
EPI CELLS # UR: ABNORMAL /HPF
GAS PNL BLDV: 138 MMOL/L — SIGNIFICANT CHANGE UP (ref 136–145)
GAS PNL BLDV: SIGNIFICANT CHANGE UP
GAS PNL BLDV: SIGNIFICANT CHANGE UP
GLUCOSE SERPL-MCNC: 101 MG/DL — HIGH (ref 70–99)
GLUCOSE UR QL: NEGATIVE MG/DL — SIGNIFICANT CHANGE UP
HCO3 BLDV-SCNC: 30 MMOL/L — HIGH (ref 22–29)
HCT VFR BLD CALC: 35.2 % — LOW (ref 37–47)
HCT VFR BLDA CALC: 37.4 % — SIGNIFICANT CHANGE UP (ref 34–44)
HGB BLD CALC-MCNC: 12.2 G/DL — LOW (ref 14–18)
HGB BLD-MCNC: 12.2 G/DL — SIGNIFICANT CHANGE UP (ref 12–16)
IMM GRANULOCYTES NFR BLD AUTO: 0.1 % — SIGNIFICANT CHANGE UP (ref 0.1–0.3)
INR BLD: 1.41 RATIO — HIGH (ref 0.65–1.3)
KETONES UR-MCNC: NEGATIVE — SIGNIFICANT CHANGE UP
LACTATE BLDV-MCNC: 0.8 MMOL/L — SIGNIFICANT CHANGE UP (ref 0.5–1.6)
LEUKOCYTE ESTERASE UR-ACNC: NEGATIVE — SIGNIFICANT CHANGE UP
LIDOCAIN IGE QN: 47 U/L — SIGNIFICANT CHANGE UP (ref 7–60)
LYMPHOCYTES # BLD AUTO: 2.34 K/UL — SIGNIFICANT CHANGE UP (ref 1.2–3.4)
LYMPHOCYTES # BLD AUTO: 34.3 % — SIGNIFICANT CHANGE UP (ref 20.5–51.1)
MAGNESIUM SERPL-MCNC: 1.7 MG/DL — LOW (ref 1.8–2.4)
MCHC RBC-ENTMCNC: 29.2 PG — SIGNIFICANT CHANGE UP (ref 27–31)
MCHC RBC-ENTMCNC: 34.7 G/DL — SIGNIFICANT CHANGE UP (ref 32–37)
MCV RBC AUTO: 84.2 FL — SIGNIFICANT CHANGE UP (ref 81–99)
MONOCYTES # BLD AUTO: 0.65 K/UL — HIGH (ref 0.1–0.6)
MONOCYTES NFR BLD AUTO: 9.5 % — HIGH (ref 1.7–9.3)
NEUTROPHILS # BLD AUTO: 3.67 K/UL — SIGNIFICANT CHANGE UP (ref 1.4–6.5)
NEUTROPHILS NFR BLD AUTO: 53.9 % — SIGNIFICANT CHANGE UP (ref 42.2–75.2)
NITRITE UR-MCNC: NEGATIVE — SIGNIFICANT CHANGE UP
NRBC # BLD: 0 /100 WBCS — SIGNIFICANT CHANGE UP (ref 0–0)
PCO2 BLDV: 40 MMHG — LOW (ref 41–51)
PH BLDV: 7.48 — HIGH (ref 7.26–7.43)
PH UR: 6.5 — SIGNIFICANT CHANGE UP (ref 5–8)
PLATELET # BLD AUTO: 247 K/UL — SIGNIFICANT CHANGE UP (ref 130–400)
PO2 BLDV: 76 MMHG — HIGH (ref 20–40)
POTASSIUM BLDV-SCNC: 4.3 MMOL/L — SIGNIFICANT CHANGE UP (ref 3.3–5.6)
POTASSIUM SERPL-MCNC: 3.3 MMOL/L — LOW (ref 3.5–5)
POTASSIUM SERPL-SCNC: 3.3 MMOL/L — LOW (ref 3.5–5)
PROT SERPL-MCNC: 7.2 G/DL — SIGNIFICANT CHANGE UP (ref 6–8)
PROT UR-MCNC: NEGATIVE MG/DL — SIGNIFICANT CHANGE UP
PROTHROM AB SERPL-ACNC: 15.2 SEC — HIGH (ref 9.95–12.87)
RBC # BLD: 4.18 M/UL — LOW (ref 4.2–5.4)
RBC # FLD: 12.5 % — SIGNIFICANT CHANGE UP (ref 11.5–14.5)
SAO2 % BLDV: 96 % — SIGNIFICANT CHANGE UP
SODIUM SERPL-SCNC: 140 MMOL/L — SIGNIFICANT CHANGE UP (ref 135–146)
SP GR SPEC: 1.01 — SIGNIFICANT CHANGE UP (ref 1.01–1.03)
TYPE + AB SCN PNL BLD: SIGNIFICANT CHANGE UP
UROBILINOGEN FLD QL: 1 MG/DL (ref 0.2–0.2)
WBC # BLD: 6.82 K/UL — SIGNIFICANT CHANGE UP (ref 4.8–10.8)
WBC # FLD AUTO: 6.82 K/UL — SIGNIFICANT CHANGE UP (ref 4.8–10.8)

## 2018-08-29 RX ORDER — IOHEXOL 300 MG/ML
30 INJECTION, SOLUTION INTRAVENOUS ONCE
Qty: 0 | Refills: 0 | Status: COMPLETED | OUTPATIENT
Start: 2018-08-29 | End: 2018-08-29

## 2018-08-29 RX ADMIN — IOHEXOL 30 MILLILITER(S): 300 INJECTION, SOLUTION INTRAVENOUS at 01:20

## 2018-08-29 NOTE — CONSULT NOTE ADULT - ASSESSMENT
58 yo F with postoperative incisional pain  - no signs of surgical site infection    Plan  No acute surgical pathology  No signs of surgical site infection  No need for antibiotics  No need for narcotic medications  OK to discharge home  F/u with Dr. Olvera in office as scheduled on this Friday  Ok to use tylenol PRN for pain control until f/u in office  Discussed with Dr. Sprague, Dr. Avilze, ED

## 2018-08-29 NOTE — ED ADULT NURSE NOTE - OBJECTIVE STATEMENT
Pt presenting with R lower abd pain. Pt s/p gastric bypass surgery 8/2. Pt states she has been having the pain since the surgery, but pain is intermittent. Pt states pain was tolerable, but worsened today, prompting ED visit. Pt denies chest pain, sob, chills, n/v, fever.

## 2018-08-29 NOTE — ED PROVIDER NOTE - OBJECTIVE STATEMENT
58 yo F with pmhx of recent gastric bypass surgery done by Dr. Olvera 8/2/18 presenting for right mid abdominal pain that has been present post operatively but states pain has been progressively worsening. No cp, sob, fever, chills, nausea, vomiting, diarrhea, back pain, urinary symptoms, headache, dizziness,  paresthesias, or weakness.

## 2018-08-29 NOTE — ED PROVIDER NOTE - PHYSICAL EXAMINATION
VITAL SIGNS: I have reviewed nursing notes and confirm.  CONSTITUTIONAL: Well-developed; well-nourished; in no acute distress.  SKIN: Skin exam is warm and dry, no acute rash. No swelling, redness, swelling, bleeding, or drainage from incisional scar.      HEAD: Normocephalic; atraumatic.  EYES: PERRL, EOM intact; conjunctiva and sclera clear.  ENT: No nasal discharge; airway clear.   NECK: Supple; non tender.  CARD: S1, S2 normal; no murmurs, gallops, or rubs. Regular rate and rhythm.  RESP: Clear to auscultation bilaterally. No wheezes, rales or rhonchi.  ABD: Normal bowel sounds; soft; non-distended; +right mid and right lower abdominal tenderness overlying incisional scar. No rebound tenderness or guarding.   EXT: Normal ROM. No edema.  LYMPH: No acute cervical adenopathy.  NEURO: Alert, oriented. Grossly unremarkable. No focal deficits.  PSYCH: Cooperative, appropriate.

## 2018-08-29 NOTE — ED PROVIDER NOTE - ATTENDING CONTRIBUTION TO CARE
58 yo F sp gastric bypass 8/2 here for assessment of pain to R side of abdomen at surgical site since surgery. Pain worse today so came to ED. No fever, chills, nausea, vomiting, diarrhea. Patietn is taking PO liquids and purees well. Saw Dr. Olvera 10 days ago for follow up, was told this pain was "normal healing" but felt worse today and missed follow up on Friday so came to ED.    VS normal, patient looks well, is ambulating without difficulty, has well healing incisions, mild diffuse tenderness, no rebound or guarding.     Will check labs, bariatric protocol CT, consider surgical assessment and reassess.

## 2018-08-29 NOTE — ED ADULT NURSE NOTE - NSIMPLEMENTINTERV_GEN_ALL_ED
Implemented All Universal Safety Interventions:  [place] to call system. Call bell, personal items and telephone within reach. Instruct patient to call for assistance. Room bathroom lighting operational. Non-slip footwear when patient is off stretcher. Physically safe environment: no spills, clutter or unnecessary equipment. Stretcher in lowest position, wheels locked, appropriate side rails in place.

## 2018-08-29 NOTE — ED PROVIDER NOTE - PROGRESS NOTE DETAILS
Surgery consulted discussed case with resident Dr. Montero Patient cleared by surgery for dc pt has appointment with Dr. Olvera this Friday. MIS fellow bedside speaking to patient. Discussed strict return precautions with patient. Patient seen by fellow, cleared for discharge with follow up on Friday with Dr. Olvera

## 2018-08-29 NOTE — ED PROVIDER NOTE - NS ED ROS FT
Review of Systems:  	•	CONSTITUTIONAL - no fever, no diaphoresis, no chills  	•	SKIN - no rash  	•	HEMATOLOGIC - no bleeding, no bruising  	•	EYES - no eye pain, no blurry vision  	•	ENT - no congestion  	•	RESPIRATORY - no shortness of breath, no cough  	•	CARDIAC - no chest pain, no palpitations  	•	GI - + abd pain, no nausea, no vomiting, no diarrhea, no constipation  	•	GENITO-URINARY - no dysuria; no hematuria, no increased urinary frequency  	•	MUSCULOSKELETAL - no joint paint, no swelling, no redness  	•	NEUROLOGIC - no weakness, no headache, no paresthesias, no LOC  	All other ROS are negative except as documented in HPI.

## 2018-08-29 NOTE — CONSULT NOTE ADULT - SUBJECTIVE AND OBJECTIVE BOX
58 yo F with h/o HTN, HLD, AMADEO on CPAP, GERD, hypothyroidism, BMI 40, depression. S/p lap RnY bypass and primary hiatal hernia repair. She had percocet for 1 week after procedure, saw Dr. Olvera in office in 1 week and was doing ok from surgery stand point. Prescribed tylenol-codeine for incisional pain. She stopped taking medication 1 week ago and still has pain in RUQ around incision. No fever, no n/v.    Physical exam:  Gen: a&ox3  Lungs: clear b/l  Abd: soft, ND, no signs of cellulitis (erythema, no drainage), mild tenderness around incision in RUQ                          12.2   6.82  )-----------( 247      ( 29 Aug 2018 00:11 )             35.2   08-29    140  |  100  |  11  ----------------------------<  101<H>  3.3<L>   |  25  |  0.6<L>    Ca    9.3      29 Aug 2018 00:11  Mg     1.7     08-29    TPro  7.2  /  Alb  4.2  /  TBili  0.7  /  DBili  x   /  AST  27  /  ALT  25  /  AlkPhos  69  08-29    Lipase 47    < from: CT Abdomen and Pelvis w/ Oral Cont and w/ IV Cont (08.29.18 @ 03:50) >  IMPRESSION:      No evidence of acute intra-abdominal pathology.    Status post Franky-en-Y gastric bypass without contrast extravasation    Mild inflammatory changes in the right anterior abdominal wall, this is   compatible with a site of surgical port    Small nonspecific pocket of fluid just inferior to the cecum (within   right inguinal canal region).    < end of copied text >

## 2018-08-29 NOTE — ED PROVIDER NOTE - MEDICAL DECISION MAKING DETAILS
CT without acute findings, labs unremarkable, patient seen by surgery, cleared but fellow would like to see patient, will likely dc home

## 2018-08-29 NOTE — ED ADULT NURSE NOTE - CHPI ED NUR SYMPTOMS NEG
no dysuria/no blood in stool/no nausea/no vomiting/no chills/no fever/no burning urination/no abdominal distension/no diarrhea/no hematuria

## 2018-08-31 ENCOUNTER — APPOINTMENT (OUTPATIENT)
Dept: SURGERY | Facility: CLINIC | Age: 58
End: 2018-08-31
Payer: MEDICAID

## 2018-08-31 VITALS — BODY MASS INDEX: 30.38 KG/M2 | HEIGHT: 71 IN | WEIGHT: 217 LBS

## 2018-08-31 PROCEDURE — 99024 POSTOP FOLLOW-UP VISIT: CPT

## 2018-10-17 ENCOUNTER — OUTPATIENT (OUTPATIENT)
Dept: OUTPATIENT SERVICES | Facility: HOSPITAL | Age: 58
LOS: 1 days | Discharge: HOME | End: 2018-10-17

## 2018-10-17 ENCOUNTER — APPOINTMENT (OUTPATIENT)
Dept: UROGYNECOLOGY | Facility: CLINIC | Age: 58
End: 2018-10-17

## 2018-10-17 VITALS
HEIGHT: 71 IN | WEIGHT: 208 LBS | SYSTOLIC BLOOD PRESSURE: 104 MMHG | BODY MASS INDEX: 29.12 KG/M2 | DIASTOLIC BLOOD PRESSURE: 70 MMHG

## 2018-10-17 DIAGNOSIS — Z98.890 OTHER SPECIFIED POSTPROCEDURAL STATES: Chronic | ICD-10-CM

## 2018-10-17 DIAGNOSIS — Z98.82 BREAST IMPLANT STATUS: Chronic | ICD-10-CM

## 2018-10-17 DIAGNOSIS — Z30.2 ENCOUNTER FOR STERILIZATION: Chronic | ICD-10-CM

## 2018-10-18 DIAGNOSIS — N39.46 MIXED INCONTINENCE: ICD-10-CM

## 2018-11-02 ENCOUNTER — APPOINTMENT (OUTPATIENT)
Dept: SURGERY | Facility: CLINIC | Age: 58
End: 2018-11-02
Payer: MEDICARE

## 2018-11-02 VITALS — HEIGHT: 65.5 IN | BODY MASS INDEX: 33.84 KG/M2 | WEIGHT: 205.6 LBS

## 2018-11-02 DIAGNOSIS — R73.03 PREDIABETES.: ICD-10-CM

## 2018-11-02 PROCEDURE — 99213 OFFICE O/P EST LOW 20 MIN: CPT

## 2018-11-02 RX ORDER — NYSTATIN 100000 [USP'U]/G
POWDER TOPICAL 3 TIMES DAILY
Refills: 0 | Status: COMPLETED | COMMUNITY
End: 2018-11-02

## 2018-11-15 ENCOUNTER — CLINICAL ADVICE (OUTPATIENT)
Age: 58
End: 2018-11-15

## 2018-11-19 ENCOUNTER — CLINICAL ADVICE (OUTPATIENT)
Age: 58
End: 2018-11-19

## 2018-11-20 ENCOUNTER — CLINICAL ADVICE (OUTPATIENT)
Age: 58
End: 2018-11-20

## 2018-12-10 ENCOUNTER — RX RENEWAL (OUTPATIENT)
Age: 58
End: 2018-12-10

## 2019-01-25 ENCOUNTER — APPOINTMENT (OUTPATIENT)
Dept: SURGERY | Facility: CLINIC | Age: 59
End: 2019-01-25
Payer: MEDICARE

## 2019-01-25 VITALS — HEIGHT: 65.5 IN | BODY MASS INDEX: 33.09 KG/M2 | WEIGHT: 201 LBS

## 2019-01-25 PROCEDURE — 99213 OFFICE O/P EST LOW 20 MIN: CPT

## 2019-01-25 NOTE — PLAN
[FreeTextEntry1] : PLAN:  Resume exercise.\par             Focus on high protein foods and decrease CHO and sugary food.\par             Return to office in 1 month.\par             Call with concerns.

## 2019-01-25 NOTE — ASSESSMENT
[FreeTextEntry1] : SLIME COHEN is a 58 year female seen today for Bariatric follow up visit. Patient is not at expected weight loss for surgery. She is having dental work done and has difficulty with textured foods.\par Patient admits that she has been going back to bad habits and has been consuming more dairy and carbohydrates.\par She eats 3 meals/day with several high calorie snacks.  Breakfast - eggs/grits with sardines. Lunch - butter beans/chicken.  Dinner fish/chicken/tuna fish salad with softly cooked vegetables/chili.  She has macaroni and cheese almost daily.  Snacks - cheeses and chocolate.  Reviewed healthy snack ideas with patient and recommend that she decrease high caloric foods. \par Fluid intake is adequate with mainly water, trin[place] teas.\par She is not exercising Recommend that she resume her exercise.3-4 days/week for 45 minutes.\par \par

## 2019-01-25 NOTE — DATA REVIEWED
[FreeTextEntry1] : \par Wt. change since last visit: -4.6 lbs\par %EWL: 37\par TWL: 41\par BMI: 28\par \par Blood work reviewed, all values are essentially with in normal limits.\par \par

## 2019-01-25 NOTE — HISTORY OF PRESENT ILLNESS
[Pre-Op Weight ___] : Pre-op weight was [unfilled] lbs [Procedure: ___] : Procedure performed: [unfilled]  [Date of Surgery: ___] : Date of Surgery:   [unfilled] [___ Months Post Op] : [unfilled] months [de-identified] : Patient had surgery approximately 6 months ago. \par She denies heartburn/reflux/vomiting and food intolerance. She c/o increase gas especially with dairy foods.\par Her stress incontinence, back and knee pain,hypercholesterolemia all improved.  She is compliant with CPAP.\par

## 2019-02-15 ENCOUNTER — APPOINTMENT (OUTPATIENT)
Dept: SURGERY | Facility: CLINIC | Age: 59
End: 2019-02-15

## 2019-03-12 ENCOUNTER — MESSAGE (OUTPATIENT)
Age: 59
End: 2019-03-12

## 2019-03-27 ENCOUNTER — OTHER (OUTPATIENT)
Age: 59
End: 2019-03-27

## 2019-04-03 ENCOUNTER — OUTPATIENT (OUTPATIENT)
Dept: OUTPATIENT SERVICES | Facility: HOSPITAL | Age: 59
LOS: 1 days | Discharge: HOME | End: 2019-04-03
Payer: MEDICARE

## 2019-04-03 DIAGNOSIS — Z98.890 OTHER SPECIFIED POSTPROCEDURAL STATES: Chronic | ICD-10-CM

## 2019-04-03 DIAGNOSIS — Z12.31 ENCOUNTER FOR SCREENING MAMMOGRAM FOR MALIGNANT NEOPLASM OF BREAST: ICD-10-CM

## 2019-04-03 DIAGNOSIS — Z98.82 BREAST IMPLANT STATUS: Chronic | ICD-10-CM

## 2019-04-03 DIAGNOSIS — Z30.2 ENCOUNTER FOR STERILIZATION: Chronic | ICD-10-CM

## 2019-04-03 PROCEDURE — 77067 SCR MAMMO BI INCL CAD: CPT | Mod: 26

## 2019-04-03 PROCEDURE — 77063 BREAST TOMOSYNTHESIS BI: CPT | Mod: 26

## 2019-04-26 ENCOUNTER — APPOINTMENT (OUTPATIENT)
Dept: SURGERY | Facility: CLINIC | Age: 59
End: 2019-04-26

## 2019-04-27 ENCOUNTER — EMERGENCY (EMERGENCY)
Facility: HOSPITAL | Age: 59
LOS: 0 days | Discharge: HOME | End: 2019-04-27
Admitting: EMERGENCY MEDICAL TECHNICIAN, BASIC
Payer: MEDICARE

## 2019-04-27 VITALS
HEART RATE: 78 BPM | OXYGEN SATURATION: 98 % | TEMPERATURE: 97 F | DIASTOLIC BLOOD PRESSURE: 73 MMHG | HEIGHT: 65 IN | WEIGHT: 197.09 LBS | SYSTOLIC BLOOD PRESSURE: 144 MMHG | RESPIRATION RATE: 20 BRPM

## 2019-04-27 DIAGNOSIS — Z98.890 OTHER SPECIFIED POSTPROCEDURAL STATES: Chronic | ICD-10-CM

## 2019-04-27 DIAGNOSIS — Z79.899 OTHER LONG TERM (CURRENT) DRUG THERAPY: ICD-10-CM

## 2019-04-27 DIAGNOSIS — Z98.82 BREAST IMPLANT STATUS: Chronic | ICD-10-CM

## 2019-04-27 DIAGNOSIS — H57.89 OTHER SPECIFIED DISORDERS OF EYE AND ADNEXA: ICD-10-CM

## 2019-04-27 DIAGNOSIS — Z30.2 ENCOUNTER FOR STERILIZATION: Chronic | ICD-10-CM

## 2019-04-27 DIAGNOSIS — Z79.810 LONG TERM (CURRENT) USE OF SELECTIVE ESTROGEN RECEPTOR MODULATORS (SERMS): ICD-10-CM

## 2019-04-27 DIAGNOSIS — H10.9 UNSPECIFIED CONJUNCTIVITIS: ICD-10-CM

## 2019-04-27 DIAGNOSIS — Z79.1 LONG TERM (CURRENT) USE OF NON-STEROIDAL ANTI-INFLAMMATORIES (NSAID): ICD-10-CM

## 2019-04-27 DIAGNOSIS — Z88.1 ALLERGY STATUS TO OTHER ANTIBIOTIC AGENTS STATUS: ICD-10-CM

## 2019-04-27 DIAGNOSIS — Z79.890 HORMONE REPLACEMENT THERAPY: ICD-10-CM

## 2019-04-27 PROCEDURE — 99283 EMERGENCY DEPT VISIT LOW MDM: CPT

## 2019-04-27 RX ORDER — POLYMYXIN B SULF/TRIMETHOPRIM 10000-1/ML
1 DROPS OPHTHALMIC (EYE) ONCE
Qty: 0 | Refills: 0 | Status: COMPLETED | OUTPATIENT
Start: 2019-04-27 | End: 2019-04-27

## 2019-04-27 RX ADMIN — Medication 1 DROP(S): at 02:23

## 2019-04-27 NOTE — ED PROVIDER NOTE - OBJECTIVE STATEMENT
Pt is a 59y/o female presents today for eval of bilateral eye redness and purulent discharge x 2 days. Pt sts grandson recently was diagnosed with conjunctivitis. Pt denies visual changes, fever, chills, weakness, numbness, trauma.

## 2019-04-27 NOTE — ED PROVIDER NOTE - PHYSICAL EXAMINATION
VITAL SIGNS: I have reviewed nursing notes and confirm.  CONSTITUTIONAL: Well-developed; well-nourished; in no acute distress.   SKIN: skin exam is warm and dry, no acute rash.    HEAD: Normocephalic; atraumatic.  EYES: injected conjunctiva and mild purulence to both eyes  ENT: No nasal discharge; airway clear.  EXT: Normal ROM.  No clubbing, cyanosis or edema.   NEURO: Alert, oriented, grossly unremarkable

## 2019-04-27 NOTE — ED ADULT NURSE NOTE - NSIMPLEMENTINTERV_GEN_ALL_ED
2 ensure daily, weight has been stable for ~ 3 months/good 2 ensure daily, weight has been stable for ~ 3 months, avoids, sauces, heavy spices, fried foods secondary to reflux/good Implemented All Universal Safety Interventions:  [place] to call system. Call bell, personal items and telephone within reach. Instruct patient to call for assistance. Room bathroom lighting operational. Non-slip footwear when patient is off stretcher. Physically safe environment: no spills, clutter or unnecessary equipment. Stretcher in lowest position, wheels locked, appropriate side rails in place.

## 2019-04-27 NOTE — ED PROVIDER NOTE - NSFOLLOWUPCLINICS_GEN_ALL_ED_FT
[facility]  Ophthalmolgy  [address], [address]  [place] [address]  Phone: [phone]  Fax:   Follow Up Time:

## 2019-04-27 NOTE — ED PROVIDER NOTE - NS ED ROS FT
Eyes:  + eye irritation, + redness No visual changes, eye pain or discharge.  MS:  No myalgia, muscle weakness, joint pain or back pain.  Neuro:  No headache or weakness.  No LOC.  Skin:  No skin rash.   Endocrine: No history of thyroid disease or diabetes.  Except as documented in the HPI,  all other systems are negative.

## 2019-04-30 ENCOUNTER — OUTPATIENT (OUTPATIENT)
Dept: OUTPATIENT SERVICES | Facility: HOSPITAL | Age: 59
LOS: 1 days | Discharge: HOME | End: 2019-04-30

## 2019-04-30 DIAGNOSIS — Z98.890 OTHER SPECIFIED POSTPROCEDURAL STATES: Chronic | ICD-10-CM

## 2019-04-30 DIAGNOSIS — Z30.2 ENCOUNTER FOR STERILIZATION: Chronic | ICD-10-CM

## 2019-04-30 DIAGNOSIS — Z98.82 BREAST IMPLANT STATUS: Chronic | ICD-10-CM

## 2019-05-07 ENCOUNTER — OUTPATIENT (OUTPATIENT)
Dept: OUTPATIENT SERVICES | Facility: HOSPITAL | Age: 59
LOS: 1 days | Discharge: HOME | End: 2019-05-07

## 2019-05-07 DIAGNOSIS — Z98.82 BREAST IMPLANT STATUS: Chronic | ICD-10-CM

## 2019-05-07 DIAGNOSIS — Z98.890 OTHER SPECIFIED POSTPROCEDURAL STATES: Chronic | ICD-10-CM

## 2019-05-07 DIAGNOSIS — H10.023 OTHER MUCOPURULENT CONJUNCTIVITIS, BILATERAL: ICD-10-CM

## 2019-05-07 DIAGNOSIS — H40.033 ANATOMICAL NARROW ANGLE, BILATERAL: ICD-10-CM

## 2019-05-07 DIAGNOSIS — Z30.2 ENCOUNTER FOR STERILIZATION: Chronic | ICD-10-CM

## 2019-07-12 ENCOUNTER — APPOINTMENT (OUTPATIENT)
Dept: SURGERY | Facility: CLINIC | Age: 59
End: 2019-07-12

## 2019-07-19 ENCOUNTER — APPOINTMENT (OUTPATIENT)
Dept: SURGERY | Facility: CLINIC | Age: 59
End: 2019-07-19
Payer: MEDICARE

## 2019-07-19 VITALS — BODY MASS INDEX: 33.38 KG/M2 | HEIGHT: 65.5 IN | WEIGHT: 202.8 LBS

## 2019-07-19 DIAGNOSIS — Z86.39 PERSONAL HISTORY OF OTHER ENDOCRINE, NUTRITIONAL AND METABOLIC DISEASE: ICD-10-CM

## 2019-07-19 PROCEDURE — 99213 OFFICE O/P EST LOW 20 MIN: CPT

## 2019-07-19 RX ORDER — ATORVASTATIN CALCIUM 20 MG/1
20 TABLET, FILM COATED ORAL
Refills: 0 | Status: DISCONTINUED | COMMUNITY
End: 2019-07-19

## 2019-07-19 RX ORDER — URSODIOL 300 MG/1
300 CAPSULE ORAL
Qty: 60 | Refills: 5 | Status: DISCONTINUED | COMMUNITY
Start: 2018-08-31 | End: 2019-07-19

## 2019-07-19 NOTE — ASSESSMENT
[FreeTextEntry1] : SLIME COHEN is a 58 year female seen today for Bariatric follow up visit. Patient is not at expected weight loss for surgery.\par She eats 3 meals/day with an occasional healthful snack. Breakfast - eggs or oatmeal 3 days week. Lunch - Quesadilla or chicken with vegetables. Dinner - chicken pot pie, hot pockets if she gets home late from work. When she is at home she has fish, shrimp with spinach or other green leafy vegetables and a small serving of pasta. Snack - a piece of fruit which satisfies her sweet craving.\par Fluid intake is adequate with mainly water.\par She has been tracking her steps and will do over 8000 steps day but has not been cleared by vascular to resume her regular exercise which was biking and using the elliptical machine for 45 minutes.  Recommend adding weight bearing exercise.

## 2019-07-19 NOTE — PLAN
[FreeTextEntry1] : PLAN:  RTO in 3 months.\par             Blood work.\par             Follow up with pulmonary.\par             Call with concerns.

## 2019-07-19 NOTE — DATA REVIEWED
[FreeTextEntry1] : \par Wt. change since last visit: +1.8 lbs\par %EWL: 35\par TWL: 40.2 lbs\par BMI: 33\par \par

## 2019-07-19 NOTE — REASON FOR VISIT
Patient to follow up with Primary Care provider regarding elevated blood pressure.  [Follow-Up Visit] : a follow-up visit for [Other___] : [unfilled] [FreeTextEntry2] : RYGB on 8/2/2018

## 2019-07-19 NOTE — HISTORY OF PRESENT ILLNESS
[de-identified] : Patient had surgery approximately 1 year ago.  She has been dealing with painful varicose veins and has been having laser treatments by Dr. Woo. She is still having problems with dentition.\par She denies heartburn/reflux/vomiting and food intolerance. Her PCP continues to refill her Omeprazole.\par Her hypertension, back pain and stress incontinence improved an hypercholesterolemia resolved. She was using her CPAP machine but the hose broke.  She was referred to pulmonary. [Procedure: ___] : Procedure performed: [unfilled]  [Date of Surgery: ___] : Date of Surgery:   [unfilled] [Pre-Op Weight ___] : Pre-op weight was [unfilled] lbs [___ Years Post Op] : [unfilled] years

## 2019-07-29 ENCOUNTER — OUTPATIENT (OUTPATIENT)
Dept: OUTPATIENT SERVICES | Facility: HOSPITAL | Age: 59
LOS: 1 days | Discharge: HOME | End: 2019-07-29
Payer: MEDICARE

## 2019-07-29 DIAGNOSIS — Z98.890 OTHER SPECIFIED POSTPROCEDURAL STATES: Chronic | ICD-10-CM

## 2019-07-29 DIAGNOSIS — Z30.2 ENCOUNTER FOR STERILIZATION: Chronic | ICD-10-CM

## 2019-07-29 DIAGNOSIS — Z98.82 BREAST IMPLANT STATUS: Chronic | ICD-10-CM

## 2019-07-29 PROCEDURE — 99213 OFFICE O/P EST LOW 20 MIN: CPT

## 2019-07-29 PROCEDURE — 92020 GONIOSCOPY: CPT

## 2019-07-31 DIAGNOSIS — H40.033 ANATOMICAL NARROW ANGLE, BILATERAL: ICD-10-CM

## 2019-07-31 DIAGNOSIS — H04.123 DRY EYE SYNDROME OF BILATERAL LACRIMAL GLANDS: ICD-10-CM

## 2019-07-31 DIAGNOSIS — H02.88A MEIBOMIAN GLAND DYSFUNCTION RIGHT EYE, UPPER AND LOWER EYELIDS: ICD-10-CM

## 2019-07-31 DIAGNOSIS — H02.88B MEIBOMIAN GLAND DYSFUNCTION LEFT EYE, UPPER AND LOWER EYELIDS: ICD-10-CM

## 2019-07-31 DIAGNOSIS — H35.033 HYPERTENSIVE RETINOPATHY, BILATERAL: ICD-10-CM

## 2019-10-18 ENCOUNTER — APPOINTMENT (OUTPATIENT)
Dept: SURGERY | Facility: CLINIC | Age: 59
End: 2019-10-18
Payer: MEDICARE

## 2019-10-18 VITALS — HEIGHT: 65.5 IN | WEIGHT: 195 LBS | BODY MASS INDEX: 32.1 KG/M2

## 2019-10-18 DIAGNOSIS — Z86.69 PERSONAL HISTORY OF OTHER DISEASES OF THE NERVOUS SYSTEM AND SENSE ORGANS: ICD-10-CM

## 2019-10-18 DIAGNOSIS — G56.90 UNSPECIFIED MONONEUROPATHY OF UNSPECIFIED UPPER LIMB: ICD-10-CM

## 2019-10-18 DIAGNOSIS — M79.671 PAIN IN RIGHT FOOT: ICD-10-CM

## 2019-10-18 DIAGNOSIS — Z86.39 PERSONAL HISTORY OF OTHER ENDOCRINE, NUTRITIONAL AND METABOLIC DISEASE: ICD-10-CM

## 2019-10-18 DIAGNOSIS — J44.9 CHRONIC OBSTRUCTIVE PULMONARY DISEASE, UNSPECIFIED: ICD-10-CM

## 2019-10-18 DIAGNOSIS — R23.2 FLUSHING: ICD-10-CM

## 2019-10-18 DIAGNOSIS — R06.09 OTHER FORMS OF DYSPNEA: ICD-10-CM

## 2019-10-18 DIAGNOSIS — Z01.810 ENCOUNTER FOR PREPROCEDURAL CARDIOVASCULAR EXAMINATION: ICD-10-CM

## 2019-10-18 DIAGNOSIS — R06.02 SHORTNESS OF BREATH: ICD-10-CM

## 2019-10-18 DIAGNOSIS — K21.9 GASTRO-ESOPHAGEAL REFLUX DISEASE W/OUT ESOPHAGITIS: ICD-10-CM

## 2019-10-18 PROCEDURE — 99213 OFFICE O/P EST LOW 20 MIN: CPT

## 2019-10-18 RX ORDER — MIRABEGRON 50 MG/1
50 TABLET, FILM COATED, EXTENDED RELEASE ORAL
Qty: 90 | Refills: 3 | Status: DISCONTINUED | COMMUNITY
Start: 2018-01-12 | End: 2019-10-18

## 2019-10-18 NOTE — ASSESSMENT
[FreeTextEntry1] : SLIME COHEN is a 58 year female seen today for Bariatric follow up visit. Patient states that she feels great.\par Patient is compliant with dietary guidelines and she continues to plan her meals..\par Fluid intake is adequate by patient's account could improve. stressed the importance of drinking 48-64 oz of zero calorie drinks daily.\par She recently resumed her after she was cleared by vascular.  She is using her stationary bike at home for 20 minutes 3 days/week and is also enjoying biking outdoors. Recommend adding weight bearing exercise.\par \par

## 2019-10-18 NOTE — HISTORY OF PRESENT ILLNESS
[Date of Surgery: ___] : Date of Surgery:   [unfilled] [Pre-Op Weight ___] : Pre-op weight was [unfilled] lbs [___ Years Post Op] : [unfilled] years [de-identified] : Patient had surgery approximately 14 months ago. \par She denies heartburn/reflux/vomiting and food intolerance.\par She is not using her CPAP machine. Discussed the risks of AMADEO and recommend that patient follow up with pulmonary.\par

## 2019-10-18 NOTE — DATA REVIEWED
[FreeTextEntry1] : \par Wt. change since last visit: -7.8 lbs\par %EWL: 42\par TWL: 48.3 lbs\par BMI: 31\par  \par Blood work reviewed, slight increase in LDL and will monitor.

## 2019-10-18 NOTE — PLAN
[FreeTextEntry1] : PLAN:  RTO in 4 months with blood work.\par             Increase exercise to 4 days wee for 45 minutes.\par             Follow up with pulmonary.\par             Call with concerns.

## 2019-11-11 NOTE — PROGRESS NOTE ADULT - MINUTES
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      Peripheral sensory neuropathy [G62.9]  Yes     Presented with severe sensory loss of BLE 01/09/18.  Improvement in sensation s/p first few doses of IV Ig (also B1, B12).        Essential hypertension [I10]  Yes    Syncope [R55]  Yes    Depression [F32.9]  Yes      Resolved Hospital Problems   No resolved problems to display.       Future Appointments   Date Time Provider Department Center   12/5/2019  9:30 AM Geovany Rick MD [facility] NEURO [facility]           I have seen and examined this patient face to face today. My clinical findings that support the need for the home health skilled services and home bound status are the following:  Weakness/numbness causing balance and gait disturbance due to Weakness/Debility and parkinsons making it taxing to leave home.  Requiring assistive device to leave home due to unsteady gait caused by  Weakness/Debility and parkinsons.    Allergies:Review of patient's allergies indicates:  No Known Allergies    Diet: cardiac diet    Activities: activity as tolerated    Nursing:   SN to complete comprehensive assessment including routine vital signs. Instruct on disease process and s/s of complications to report to MD. Review/verify medication list sent home with the patient at time of discharge 
 and instruct patient/caregiver as needed. Frequency may be adjusted depending on start of care date.    Notify MD if SBP > 160 or < 90; DBP > 90 or < 50; HR > 120 or < 50; Temp > 101; Other:         CONSULTS:    Physical Therapy to evaluate and treat. Evaluate for home safety and equipment needs; Establish/upgrade home exercise program. Perform / instruct on therapeutic exercises, gait training, transfer training, and Range of Motion.  Occupational Therapy to evaluate and treat. Evaluate home environment for safety and equipment needs. Perform/Instruct on transfers, ADL training, ROM, and therapeutic exercises.  Aide to provide assistance with personal care, ADLs, and vital signs.    MISCELLANEOUS CARE:  N/A    WOUND CARE ORDERS  n/a      Medications: Review discharge medications with patient and family and provide education.      Current Discharge Medication List      START taking these medications    Details   ciprofloxacin HCl (CIPRO) 500 MG tablet Take 1 tablet (500 mg total) by mouth every 12 (twelve) hours. for 5 days  Qty: 10 tablet, Refills: 0      metroNIDAZOLE (FLAGYL) 500 MG tablet Take 1 tablet (500 mg total) by mouth every 8 (eight) hours. for 5 days  Qty: 15 tablet, Refills: 0         CONTINUE these medications which have NOT CHANGED    Details   amLODIPine (NORVASC) 5 MG tablet Take 1 tablet (5 mg total) by mouth once daily. Contact PCP for refills  Qty: 30 tablet, Refills: 3      carbidopa-levodopa  mg (SINEMET)  mg per tablet Take 0.5 tablets by mouth 4 (four) times daily. Take 6AM / 10am / 2PM / 6PM  Qty: 90 tablet, Refills: 5      cholecalciferol, vitamin D3, 400 unit Cap Take 2 capsules (800 Units total) by mouth once daily.  Refills: 0      ergocalciferol (ERGOCALCIFEROL) 50,000 unit Cap Take 1 capsule (50,000 Units total) by mouth every 7 days.  Qty: 4 capsule, Refills: 0      escitalopram oxalate (LEXAPRO) 20 MG tablet TK 1 T PO QD  Refills: 1      gabapentin (NEURONTIN) 300 MG 
capsule Take 3 capsules (900 mg total) by mouth 3 (three) times daily.  Qty: 270 capsule, Refills: 5      levothyroxine (SYNTHROID) 50 MCG tablet Take 1 tablet (50 mcg total) by mouth before breakfast.  Qty: 30 tablet, Refills: 11      lidocaine (XYLOCAINE) 5 % Oint ointment JANA AA D PRN P  Refills: 0      lisinopril (PRINIVIL,ZESTRIL) 40 MG tablet Take 1 tablet (40 mg total) by mouth once daily.  Qty: 30 tablet, Refills: 0      mupirocin (BACTROBAN) 2 % ointment       pantoprazole (PROTONIX) 40 MG tablet       ramelteon (ROZEREM) 8 mg tablet Take 1 tablet (8 mg total) by mouth every evening.  Qty: 30 tablet, Refills: 1      riboflavin, vitamin B2, (VITAMIN B-2) 100 mg Tab tablet Take 4 tablets (400 mg total) by mouth once daily.  Refills: 0      thiamine 250 MG tablet Take 1 tablet (250 mg total) by mouth once daily.  Qty: 30 tablet, Refills: 2             I certify that this patient is confined to her home and needs intermittent skilled nursing care, physical therapy and occupational therapy.        
60
40

## 2019-11-13 ENCOUNTER — APPOINTMENT (OUTPATIENT)
Dept: UROGYNECOLOGY | Facility: CLINIC | Age: 59
End: 2019-11-13
Payer: MEDICARE

## 2019-11-13 ENCOUNTER — OUTPATIENT (OUTPATIENT)
Dept: OUTPATIENT SERVICES | Facility: HOSPITAL | Age: 59
LOS: 1 days | Discharge: HOME | End: 2019-11-13

## 2019-11-13 VITALS
DIASTOLIC BLOOD PRESSURE: 78 MMHG | BODY MASS INDEX: 32.1 KG/M2 | HEIGHT: 65.5 IN | SYSTOLIC BLOOD PRESSURE: 130 MMHG | WEIGHT: 195 LBS

## 2019-11-13 DIAGNOSIS — Z30.2 ENCOUNTER FOR STERILIZATION: Chronic | ICD-10-CM

## 2019-11-13 DIAGNOSIS — Z98.82 BREAST IMPLANT STATUS: Chronic | ICD-10-CM

## 2019-11-13 DIAGNOSIS — Z98.890 OTHER SPECIFIED POSTPROCEDURAL STATES: Chronic | ICD-10-CM

## 2019-11-13 PROCEDURE — ZZZZZ: CPT

## 2019-11-13 NOTE — COUNSELING
[FreeTextEntry1] : \par If you feel like you have an infection it is important for you to call our office and we will arrange testing of your urine.\par \par Referral to Physical Therapy \par \par Please continue taking Myrbetriq 50 mg, two hours before bedtime for frequency. Refills sent to your pharmacy.\par \par Please call my office if you have any issues with the cost or side effects of the medication. \par \par Schedule a 1 year follow up med check appointment with SILVESTRE Rodriguez.

## 2019-11-13 NOTE — DISCUSSION/SUMMARY
[FreeTextEntry1] : \par Mixed Incontinence-\par Patient happy with Myrbetriq 50 mg daily.\par Refills provided. 90 days supply with 3 refills.\par Referral for pelvic floor PT\par Precautions reviewed.\par Will return in 1 year for follow up or earlier if she has any issues.

## 2019-11-13 NOTE — HISTORY OF PRESENT ILLNESS
[FreeTextEntry1] : \par Patient is here for 1 year med/BP check for urge incontinence.\par Last seen on 10/17/18 for med/BP check.\par \par s/p trospium 20 mg, no change\par s/p Oxybutynin ER 20 mg, side effects.\par s/p Myrbetriq 25  mg (nocturia)\par Myrbetriq 50 mg daily urge incontinence\par \par Today, patient states she is happy with Myrbetriq 50 mg daily. Her nocturia has improved, but urgency during the day is bothersome. Denies side effects. Patient does not feel she has an infection.\par \par She prefers to go to pelvic floor PT and if not satisfied, she will consider PTNS.\par \par Patient would like to Myrbetriq 50 mg daily and referral for pelvic floor PT.

## 2019-11-22 DIAGNOSIS — N39.46 MIXED INCONTINENCE: ICD-10-CM

## 2020-02-12 ENCOUNTER — APPOINTMENT (OUTPATIENT)
Dept: PODIATRY | Facility: CLINIC | Age: 60
End: 2020-02-12
Payer: MEDICARE

## 2020-02-12 ENCOUNTER — OUTPATIENT (OUTPATIENT)
Dept: OUTPATIENT SERVICES | Facility: HOSPITAL | Age: 60
LOS: 1 days | Discharge: HOME | End: 2020-02-12

## 2020-02-12 VITALS — BODY MASS INDEX: 32.49 KG/M2 | HEIGHT: 65 IN | WEIGHT: 195 LBS

## 2020-02-12 VITALS — DIASTOLIC BLOOD PRESSURE: 82 MMHG | SYSTOLIC BLOOD PRESSURE: 131 MMHG | HEART RATE: 76 BPM

## 2020-02-12 DIAGNOSIS — Z98.890 OTHER SPECIFIED POSTPROCEDURAL STATES: Chronic | ICD-10-CM

## 2020-02-12 DIAGNOSIS — Z98.82 BREAST IMPLANT STATUS: Chronic | ICD-10-CM

## 2020-02-12 DIAGNOSIS — Z30.2 ENCOUNTER FOR STERILIZATION: Chronic | ICD-10-CM

## 2020-02-12 PROCEDURE — 99203 OFFICE O/P NEW LOW 30 MIN: CPT

## 2020-02-13 ENCOUNTER — FORM ENCOUNTER (OUTPATIENT)
Age: 60
End: 2020-02-13

## 2020-02-14 ENCOUNTER — OUTPATIENT (OUTPATIENT)
Dept: OUTPATIENT SERVICES | Facility: HOSPITAL | Age: 60
LOS: 1 days | Discharge: HOME | End: 2020-02-14
Payer: MEDICARE

## 2020-02-14 DIAGNOSIS — Z98.890 OTHER SPECIFIED POSTPROCEDURAL STATES: Chronic | ICD-10-CM

## 2020-02-14 DIAGNOSIS — M79.605 PAIN IN LEFT LEG: ICD-10-CM

## 2020-02-14 DIAGNOSIS — Z30.2 ENCOUNTER FOR STERILIZATION: Chronic | ICD-10-CM

## 2020-02-14 DIAGNOSIS — Z98.82 BREAST IMPLANT STATUS: Chronic | ICD-10-CM

## 2020-02-14 DIAGNOSIS — M77.9 ENTHESOPATHY, UNSPECIFIED: ICD-10-CM

## 2020-02-14 PROCEDURE — 76882 US LMTD JT/FCL EVL NVASC XTR: CPT | Mod: 26,LT

## 2020-02-21 ENCOUNTER — APPOINTMENT (OUTPATIENT)
Dept: SURGERY | Facility: CLINIC | Age: 60
End: 2020-02-21
Payer: MEDICARE

## 2020-02-21 VITALS — BODY MASS INDEX: 33.36 KG/M2 | HEIGHT: 65 IN | WEIGHT: 200.2 LBS

## 2020-02-21 PROCEDURE — 99213 OFFICE O/P EST LOW 20 MIN: CPT

## 2020-02-21 NOTE — ASSESSMENT
[FreeTextEntry1] : SLIME COHEN is a 58 year female seen today for Bariatric follow up visit. Patient states that she feels good.  Her dental work is almost completed and she is able to eat more textured foods..\par Patient states that she often skips lunch.  Breakfast - banana with peanut butter. Lunch - skips. Dinner - vegetables with chicken or fish. She has been snacking on fruits and crackers. Encouraged patient not to skip meals. \par Fluid intake is adequate with water and coffee.\par She is using her stationary bike at home for 20 minutes 3 days/week and is also enjoying biking outdoors. \par

## 2020-02-21 NOTE — PLAN
[FreeTextEntry1] : PLAN: Decrease snacks.\par            Eat 3 small meals day.\par            RTO in 6 months.\par            Call with concerns.

## 2020-02-21 NOTE — DATA REVIEWED
[FreeTextEntry1] : \par Wt. change since last visit: +4.2 lbs\par %EWL: 38\par TWL: 43.8 lbs\par BMI: 33\par \par Blood work reviewed with patient. LDL was slightly elevated and will monitor.

## 2020-02-21 NOTE — HISTORY OF PRESENT ILLNESS
[Procedure: ___] : Procedure performed: [unfilled]  [Date of Surgery: ___] : Date of Surgery:   [unfilled] [Pre-Op Weight ___] : Pre-op weight was [unfilled] lbs [___ Years Post Op] : [unfilled] years [de-identified] : Patient had surgery approximately 1 1/2 years ago. She has been extremely stressed as she is worried about her daughter who has been stuck in [place].\par She denies heartburn/reflux/vomiting and food intolerance.\par She is tired in the mornings and is not using her CPAP machine. Discussed the risks of AMADEO and recommend that patient follow up with pulmonary.\par  \par

## 2020-02-26 NOTE — PROCEDURE
[FreeTextEntry1] : Pt. seen and evaluated\par Discussed treatment and condition w/ patient\par Rx Diclofenac transdermal gel\par Rx Ultrasound of the LLE to r/o tendonitis\par Pt. to RTC in 2 weeks

## 2020-02-26 NOTE — HISTORY OF PRESENT ILLNESS
[FreeTextEntry1] : Pt. is a 59 year old female who presents to the clinic with a chief complaint of pain in her left foot. Pt. states that she does not recall any trauma to that foot. Pt. denies any recent n/f/v/c/sob. Pt. denies any other pedal complaints at this time.

## 2020-02-26 NOTE — PHYSICAL EXAM
[Skin Turgor] : normal skin turgor [Skin Lesions] : no skin lesions [Cranial Nerves] : cranial nerves 2-12 were intact [Deep Tendon Reflexes (DTR)] : deep tendon reflexes were 2+ and symmetric [Oriented To Time, Place, And Person] : oriented to person, place, and time [Impaired Insight] : insight and judgment were intact [Affect] : the affect was normal [Edema] : there was no peripheral edema [General Appearance - Alert] : alert [General Appearance - In No Acute Distress] : in no acute distress [Full Pulse] : the pedal pulses are present [FreeTextEntry1] : Pain elicited upon  plantarflexion and dorsiflexion of the left foot.

## 2020-02-28 ENCOUNTER — OUTPATIENT (OUTPATIENT)
Dept: OUTPATIENT SERVICES | Facility: HOSPITAL | Age: 60
LOS: 1 days | Discharge: HOME | End: 2020-02-28

## 2020-02-28 ENCOUNTER — APPOINTMENT (OUTPATIENT)
Dept: PODIATRY | Facility: CLINIC | Age: 60
End: 2020-02-28
Payer: MEDICARE

## 2020-02-28 DIAGNOSIS — Z98.890 OTHER SPECIFIED POSTPROCEDURAL STATES: Chronic | ICD-10-CM

## 2020-02-28 DIAGNOSIS — Z30.2 ENCOUNTER FOR STERILIZATION: Chronic | ICD-10-CM

## 2020-02-28 DIAGNOSIS — Z98.82 BREAST IMPLANT STATUS: Chronic | ICD-10-CM

## 2020-02-28 PROCEDURE — 99213 OFFICE O/P EST LOW 20 MIN: CPT

## 2020-02-28 NOTE — PHYSICAL EXAM
[General Appearance - Alert] : alert [General Appearance - In No Acute Distress] : in no acute distress [Full Pulse] : the pedal pulses are present [Edema] : there was no peripheral edema [Skin Lesions] : no skin lesions [Skin Turgor] : normal skin turgor [Deep Tendon Reflexes (DTR)] : deep tendon reflexes were 2+ and symmetric [Oriented To Time, Place, And Person] : oriented to person, place, and time [Cranial Nerves] : cranial nerves 2-12 were intact [Impaired Insight] : insight and judgment were intact [Affect] : the affect was normal [FreeTextEntry1] : Pain elicited upon  plantarflexion and dorsiflexion of the left foot.

## 2020-02-28 NOTE — PROCEDURE
[FreeTextEntry1] : Partial Posterior Tibial Tendon Tear \par \par Pt. seen and evaluated\par Discussed treatment and condition w/ patient\par Rx Diclofenac transdermal gel\par Rx [place] \par Rx Ankle Brace \par Pt. to RTC in 4 weeks

## 2020-03-13 ENCOUNTER — EMERGENCY (EMERGENCY)
Facility: HOSPITAL | Age: 60
LOS: 0 days | Discharge: HOME | End: 2020-03-13
Admitting: EMERGENCY MEDICINE
Payer: MEDICARE

## 2020-03-13 VITALS
DIASTOLIC BLOOD PRESSURE: 77 MMHG | OXYGEN SATURATION: 99 % | SYSTOLIC BLOOD PRESSURE: 185 MMHG | HEART RATE: 75 BPM | RESPIRATION RATE: 17 BRPM | TEMPERATURE: 98 F

## 2020-03-13 DIAGNOSIS — Z30.2 ENCOUNTER FOR STERILIZATION: Chronic | ICD-10-CM

## 2020-03-13 DIAGNOSIS — Z79.891 LONG TERM (CURRENT) USE OF OPIATE ANALGESIC: ICD-10-CM

## 2020-03-13 DIAGNOSIS — Z98.890 OTHER SPECIFIED POSTPROCEDURAL STATES: Chronic | ICD-10-CM

## 2020-03-13 DIAGNOSIS — Z79.899 OTHER LONG TERM (CURRENT) DRUG THERAPY: ICD-10-CM

## 2020-03-13 DIAGNOSIS — R05 COUGH: ICD-10-CM

## 2020-03-13 DIAGNOSIS — Z98.82 BREAST IMPLANT STATUS: Chronic | ICD-10-CM

## 2020-03-13 DIAGNOSIS — Z79.84 LONG TERM (CURRENT) USE OF ORAL HYPOGLYCEMIC DRUGS: ICD-10-CM

## 2020-03-13 DIAGNOSIS — Z79.1 LONG TERM (CURRENT) USE OF NON-STEROIDAL ANTI-INFLAMMATORIES (NSAID): ICD-10-CM

## 2020-03-13 PROCEDURE — 99283 EMERGENCY DEPT VISIT LOW MDM: CPT

## 2020-03-13 NOTE — ED PROVIDER NOTE - PHYSICAL EXAMINATION
VITAL SIGNS: I have reviewed nursing notes and confirm.  CONSTITUTIONAL: Well-developed; well-nourished; in no acute distress.   SKIN:  skin exam is warm and dry, no acute rash.    HEAD: Normocephalic; atraumatic.  EYES: conjunctiva and sclera clear.  ENT: No nasal discharge; airway clear.  CARD: S1, S2 normal; no murmurs, gallops, or rubs. Regular rate and rhythm.   RESP: No wheezes, rales or rhonchi.  EXT: Normal ROM.  No clubbing, cyanosis or edema.   NEURO: Alert, oriented, grossly unremarkable

## 2020-03-13 NOTE — ED PROVIDER NOTE - NS ED ROS FT
ENMT:  No hearing changes, pain, discharge or infections. No neck pain or stiffness.  Cardiac:  No chest pain, SOB or edema  Respiratory:  + cough No respiratory distress. No hemoptysis. No history of asthma or RAD.  GI:  No nausea, vomiting, diarrhea or abdominal pain.  MS:  No myalgia, muscle weakness, joint pain or back pain.  Neuro:  No headache or weakness.  No LOC.  Skin:  No skin rash.   Endocrine: No history of thyroid disease or diabetes.  Except as documented in the HPI,  all other systems are negative.

## 2020-03-13 NOTE — ED PROVIDER NOTE - PATIENT PORTAL LINK FT
You can access the FollowMyHealth Patient Portal offered by [facility] by registering at the following website: [email or web address]. By joining [facility]’s FollowMyHealth portal, you will also be able to view your health information using other applications (apps) compatible with our system.

## 2020-03-13 NOTE — ED PROVIDER NOTE - NSFOLLOWUPCLINICS_GEN_ALL_ED_FT
[facility]  Medicine  [address], [place]   Phone: [phone]  Fax:   Follow Up Time:

## 2020-03-13 NOTE — ED PROVIDER NOTE - OBJECTIVE STATEMENT
Pt is a 60y/o female with a pmhx of dry cough x 3 days with no aggravting/alleviating factors. Pt denies fever, chills, SOB, weakness, numbness, recent travel, contact with + COVID-19

## 2020-03-13 NOTE — ED ADULT NURSE NOTE - TEMPLATE
After Visit Summary   7/25/2018    Shayne Shoemaker    MRN: [id number]           Patient Information     Date Of Birth          1962        Visit Information        Provider Department      7/25/2018 11:00 AM Grover Reynoso [facility] Medication Therapy Management        Today's Diagnoses     Lung transplant recipient (H)    -  1    Fungal infection          Care Instructions    Recommendations from today's MTM visit:                                                      1. Double check your Bactrim (sulfametholazone-Trimethoprim) strength, it should be single strength (SS) or 4000/80mg daily. If it is a higher dose, give me a call.     Next MTM visit: 9/4/18 at 11 PM.     To schedule another MTM appointment, please call the clinic directly or you may call the MTM scheduling line at [phone] or toll-free at [phone].     My Clinical Pharmacist's contact information:                                                      It was a pleasure seeing you today!  Please feel free to contact me with any questions or concerns you have.      Grover Reynoso, PharmD  [facility] Pharmacist    Phone: [phone]     You may receive a survey about the MT services you received.  I would appreciate your feedback to help me serve you better in the future. Please fill it out and return it when you can. Your comments will be anonymous.              Follow-ups after your visit        Your next 10 appointments already scheduled     Jul 25, 2018 12:00 PM CDT   (Arrive by 11:45 AM)   HOLTER MONITOR VISIT with  Cvc Monitor [facility] ([facility])    [address]  [address]  [facility] [address] [phone]            Jul 25, 2018  8:00 PM CDT   CT CHEST LOW DOSE NON CONTRAST with UCCT2   [facility] CT ([facility])    [address]  1st Floor  [facility] [address] [phone]           Please  bring any scans or X-rays taken at other hospitals, if similar tests were done. Also bring a list of your medicines, including vitamins, minerals and over-the-counter drugs. It is safest to leave personal items at home.  Be sure to tell your doctor:   If you have any allergies.   If there s any chance you are pregnant.   If you are breastfeeding.  You do not need to do anything special to prepare for this exam.  Please wear loose clothing, such as a sweat suit or jogging clothes. Avoid snaps, zippers and other metal. We may ask you to undress and put on a hospital gown.            Jul 27, 2018 10:00 AM CDT   Pulmonary Treatment with UR PULMONARY REHAB 2   [facility], Cardiac Rehabilitation ([facility])    [address] [place] [address] [address] [address]   [phone]            Aug 01, 2018 10:00 AM CDT   Pulmonary Treatment with UR PULMONARY REHAB 2   [facility] Cardiac Rehabilitation ([facility])    [address] [place] [address]   [phone]            Aug 03, 2018 10:00 AM CDT   Pulmonary Treatment with UR PULMONARY REHAB 2   [facility] Cardiac [facility] ([facility])    [address] [place] [address]   [phone]            Aug 07, 2018  8:00 AM CDT   XR CHEST 2 VIEWS with UCXR1   [facility] Xray ([facility])    [address]  1st Floor  [facility] [address] [phone]           Please bring a list of your current medicines to your exam. (Include vitamins, minerals and over-thecounter medicines.) Leave your valuables at home.  Tell your doctor if there is a chance you may be pregnant.  You do not need to do anything special for this exam.            Aug 07, 2018  8:15 AM CDT    Lab with  LAB   [facility] ([facility])    [address]  1st Floor  [facility] [address]   [phone]            Aug 07, 2018  8:30 AM CDT   PFT VISIT with  PFL MARGARITA   [facility] Pulmonary Function Testing ([facility])    [address]  3rd Floor  [facility] [address]   [phone]            Aug 07, 2018  9:00 AM CDT   (Arrive by 8:45 AM)   Return Lung Transplant with Ant Hoffmann MD   [facility] Solid Organ Transplant ([facility])    [address]  3rd Floor  [facility] [address]   [phone]            Aug 08, 2018 10:00 AM CDT   Pulmonary Treatment with  PULMONARY REHAB 2   [facility], Cardiac Rehabilitation ([facility])    [address] [place] [address] 1st Floor [address]  [facility] [address]   [phone]              Future tests that were ordered for you today     Open Standing Orders        Priority Remaining Interval Expires Ordered    Magnesium Routine 39/40  7/24/2019 7/24/2018    Basic metabolic panel Routine 39/40  7/24/2019 7/24/2018    Tacrolimus level Routine 39/40  7/24/2019 7/24/2018    CBC with platelets differential Routine 39/40  7/24/2019 7/24/2018          Open Future Orders        Priority Expected Expires Ordered    BRONCHOSCOPY W BIOPSY, SINGLE/MULT SITES Routine 8/26/2018 9/25/2019 7/25/2018    PRA Donor Specific Antibody Routine 8/5/2018 8/25/2018 7/25/2018    Basic metabolic panel Routine 8/5/2018 8/31/2018 7/25/2018    Magnesium Routine 8/5/2018 8/31/2018 7/25/2018    CBC with platelets Routine 8/5/2018 8/31/2018 7/25/2018    CMV DNA quantification Routine 8/5/2018 8/31/2018 7/25/2018    X-ray Chest 2 vws* Routine 8/5/2018 8/31/2018 7/25/2018    Spirometry, Breathing Capacity Routine 8/5/2018 8/31/2018 7/25/2018    INR Routine 8/5/2018 8/31/2018 7/25/2018    Tacrolimus level Routine 8/5/2018 8/31/2018  7/25/2018            Who to contact     If you have questions or need follow up information about today's clinic visit or your schedule please contact  [facility] directly at [phone].  Normal or non-critical lab and imaging results will be communicated to you by VMLogixhart, letter or phone within 4 business days after the clinic has received the results. If you do not hear from us within 7 days, please contact the clinic through VMLogixhart or phone. If you have a critical or abnormal lab result, we will notify you by phone as soon as possible.  Submit refill requests through [employer] or call your pharmacy and they will forward the refill request to us. Please allow 3 business days for your refill to be completed.          Additional Information About Your Visit        [employer] Information     [employer] gives you secure access to your electronic health record. If you see a primary care provider, you can also send messages to your care team and make appointments. If you have questions, please call your primary care clinic.  If you do not have a primary care provider, please call [phone] and they will assist you.        Care EveryWhere ID     This is your Care EveryWhere ID. This could be used by other organizations to access your [facility] medical records  [id number]         Blood Pressure from Last 3 Encounters:   07/25/18 (!) 148/95   07/19/18 120/78   07/17/18 128/82    Weight from Last 3 Encounters:   07/25/18 200 lb 6.4 oz (90.9 kg)   07/20/18 200 lb (90.7 kg)   07/18/18 202 lb (91.6 kg)              Today, you had the following     No orders found for display         Today's Medication Changes          These changes are accurate as of 7/25/18 11:46 AM.  If you have any questions, ask your nurse or doctor.               Stop taking these medicines if you haven't already. Please contact your care team if you have questions.     calcium carbonate 500 MG chewable tablet   Commonly known  as:  TUMS   Stopped by:  Grover Reynoso, [facility]                    Primary Care Provider Office Phone # Fax #    Christos NELSON Vote [phone]       [address] [address]        Equal Access to Services     LAVERN BAILON : Hadii deidra vasquez hadashlyno Soomaali, waaxda luqadaha, qaybta kaalmada adeegyada, waxay cadenin hayshanikan jacobo liz malissa heard. So [place] [phone].    ATENCIÓN: Si habla español, tiene a arita disposición servicios gratuitos de asistencia lingüística. Llame al [phone].    We comply with applicable federal civil rights laws and [place] laws. We do not discriminate on the basis of race, color, national origin, age, disability, sex, sexual orientation, or gender identity.            Thank you!     Thank you for choosing [facility]NAG[facility]  for your care. Our goal is always to provide you with excellent care. Hearing back from our patients is one way we can continue to improve our services. Please take a few minutes to complete the written survey that you may receive in the mail after your visit with us. Thank you!             Your Updated Medication List - Protect others around you: Learn how to safely use, store and throw away your medicines at www.disposemymeds.org.          This list is accurate as of 7/25/18 11:46 AM.  Always use your most recent med list.                   Brand Name Dispense Instructions for use Diagnosis    aspirin 81 MG chewable tablet     30 tablet    Take 1 tablet (81 mg) by mouth daily    Coronary artery disease involving native heart without angina pectoris, unspecified vessel or lesion type       calcium-vitamin D 600-400 MG-UNIT per tablet    CALTRATE    60 tablet    Take 1 tablet by mouth 2 times daily (with meals)    S/P lung transplant (H)       Cholecalciferol 5000 units Tabs     30 tablet    Take 5,000 Units by mouth daily    Vitamin D deficiency       magnesium oxide 400 MG tablet    MAG-OX     60 tablet    Take 1 tablet (400 mg) by mouth 2 times daily    Hypomagnesemia       metoprolol tartrate 100 MG tablet    LOPRESSOR    60 tablet    Take 1 tablet (100 mg) by mouth 2 times daily HOLD for SBP < 100 or HR < 60    Sinus tachycardia       multivitamin, therapeutic with minerals Tabs tablet     30 each    Take 1 tablet by mouth daily    S/P lung transplant (H)       mycophenolate 250 MG capsule    GENERIC EQUIVALENT    360 capsule    Take 6 capsules (1,500 mg) by mouth 2 times daily    S/P lung transplant (H)       nystatin 387782 UNIT/ML suspension    MYCOSTATIN    473 mL    Swish and swallow 10 mLs (1,000,000 Units) in mouth 4 times daily    S/P lung transplant (H)       ondansetron 4 MG ODT tab    ZOFRAN-ODT    60 tablet    Take 1 tablet (4 mg) by mouth every 6 hours as needed for nausea or vomiting    Nausea       order for DME     1 Device    Equipment being ordered: Oxygen 2 liters at rest, 8 liters with activity (or 6 liters with oximizer tubing).  Consider liquid oxygen.  Requires portability.    ILD (interstitial lung disease) (H), Hypoxia       pantoprazole 40 MG EC tablet    PROTONIX    30 tablet    Take 1 tablet (40 mg) by mouth daily    Gastroesophageal reflux disease without esophagitis       posaconazole 100 MG Tbec EC tablet    NOXAFIL    90 tablet    Take 3 tablets (300 mg) by mouth every morning Until directed to stop.    Lung replaced by transplant (H), Fungus infection       pravastatin 20 MG tablet    PRAVACHOL    30 tablet    Take 1 tablet (20 mg) by mouth every evening    Coronary artery disease involving native heart without angina pectoris, unspecified vessel or lesion type       predniSONE 5 MG tablet    DELTASONE    300 tablet    DateAMPM 6/18/201822.522.5 7/2/201822.520 7/16/20181515 7/30/201812.512.5 8/13/201812.510 9/10/35100089 10/8/4527792.5 11/12/20187.55 12/10/083946.5    S/P lung transplant (H)       simethicone 80 MG chewable tablet    MYLICON    120 tablet    Take 1  tablet (80 mg) by mouth every 6 hours as needed for cramping    Flatulence, eructation and gas pain       sulfamethoxazole-trimethoprim 400-80 MG per tablet    BACTRIM/SEPTRA    30 tablet    1 tablet by Oral or NG Tube route daily    S/P lung transplant (H)       * tacrolimus 1 MG capsule    GENERIC EQUIVALENT    120 capsule    Take 2 capsules (2 mg) by mouth 3 times daily Total dose 2.5 mg three times daily.    Lung transplant recipient (H)       * tacrolimus 0.5 MG capsule    GENERIC EQUIVALENT    60 capsule    Take 1 capsule (0.5 mg) by mouth 3 times daily Total dose 2.5 mg three times a day.    S/P lung transplant (H)       valGANciclovir 450 MG tablet    VALCYTE    60 tablet    2 tablets (900 mg) by Oral or Feeding Tube route daily    S/P lung transplant (H)       * Notice:  This list has 2 medication(s) that are the same as other medications prescribed for you. Read the directions carefully, and ask your doctor or other care provider to review them with you.       Cold/Sinus

## 2020-03-15 LAB
RAPID RVP RESULT: DETECTED
RV+EV RNA SPEC QL NAA+PROBE: DETECTED

## 2020-05-20 ENCOUNTER — APPOINTMENT (OUTPATIENT)
Dept: PODIATRY | Facility: CLINIC | Age: 60
End: 2020-05-20
Payer: MEDICARE

## 2020-05-20 ENCOUNTER — OUTPATIENT (OUTPATIENT)
Dept: OUTPATIENT SERVICES | Facility: HOSPITAL | Age: 60
LOS: 1 days | Discharge: HOME | End: 2020-05-20

## 2020-05-20 DIAGNOSIS — Z98.890 OTHER SPECIFIED POSTPROCEDURAL STATES: Chronic | ICD-10-CM

## 2020-05-20 DIAGNOSIS — Z30.2 ENCOUNTER FOR STERILIZATION: Chronic | ICD-10-CM

## 2020-05-20 DIAGNOSIS — Z98.82 BREAST IMPLANT STATUS: Chronic | ICD-10-CM

## 2020-05-20 PROCEDURE — 99214 OFFICE O/P EST MOD 30 MIN: CPT

## 2020-05-20 NOTE — END OF VISIT
[] : Resident [Resident] : Resident [FreeTextEntry3] : agree with above note.  \par Was present and instructing resident during visit. \par All Procedure performed under direct supervision.  \par TOMÁS Braga DPM\par  [FreeTextEntry2] : agree with above note.  \par Was present and instructing resident during visit. \par All Procedure performed under direct supervision.  \par TOMÁS Braga DPM\par

## 2020-05-20 NOTE — PROCEDURE
[FreeTextEntry1] : Partial Posterior Tibial Tendon Tear \par \par Pt. seen and evaluated\par Discussed treatment and condition w/ patient\par All risk and benefit of surgery were discussed prior to treatment patient is aware and in agreement of treatment and follow up plan\par cont Diclofenac transdermal gel as needed\par order MRI of the left foot PT tear at the insertion on US\par Rx Camboot \par f/u MRI, pt likely will need surgical intervention repair of PT tendon in early june 2020\par RTC 2 weeks

## 2020-05-20 NOTE — ASSESSMENT
[FreeTextEntry1] : BIOMECHANICAL EXAMINATION\par \par STJ Neutral Position: Left [0]\par First Ray dorsiflexion: Left [5mm] \par First Ray plantarflexion: Left [5mm] \par First Ray Neurtal position: Left[ 0mm]\par Hallux dorsiflexion (not loaded): Left [65] \par Hallux dorsiflexion (loaded): Left [65] ]\par Hallux plantar flexion: Left [>30]\par \par *Foot Morphology\par Frontal plane\par Normal morphology: [Left]\par Forefoot: Left [valgus] & Right [valgus]\par Rearfoot: Left [valgus] & Right [valgus]\par \par *Ankle Morphology\par Normal morphology: Right & Left  \par [Equinus]:  Left\par \par *Malleolar position: Left \par [Internal ]\par \par *NCSP (Neutral Calcaneal Stance position in degrees)\par Left [5] degrees\par \par *RCSP (Resting Calcaneal Stance Position in degrees)\par Left [5] degrees\par Right [] degrees\par \par *Quality of motion: Left &Right\par Ankle (dorsiflexion): [normal ]\par Ankle (plantarflexion): [normall]\par STJ (supination): [normal ]\par STJ (pronation): [normal ]\par Hallux (dorsiflexion): [normall]\par Hallux (plantarflexion): [normal ]\par \par *Saggittal Plane: Left \par [Planovalgus]\par \par *Limb Length Inequality Left\par [Normal]\par \par \par *Gait Analysis (Barefoot gait pattern)\par [Antalgic]\par \par *Muscle Strength (4/5)  Left\par \par *Transverse Plane\par Forefoot abducted: Left \par \par

## 2020-05-20 NOTE — PHYSICAL EXAM
[General Appearance - In No Acute Distress] : in no acute distress [General Appearance - Alert] : alert [Full Pulse] : the pedal pulses are present [Edema] : there was no peripheral edema [Skin Lesions] : no skin lesions [Skin Turgor] : normal skin turgor [Cranial Nerves] : cranial nerves 2-12 were intact [Deep Tendon Reflexes (DTR)] : deep tendon reflexes were 2+ and symmetric [Impaired Insight] : insight and judgment were intact [Oriented To Time, Place, And Person] : oriented to person, place, and time [Affect] : the affect was normal [FreeTextEntry1] : Pain elicited upon  plantarflexion and dorsiflexion of the left foot, pain on palpation on the PT inseration left foot

## 2020-05-26 ENCOUNTER — RESULT REVIEW (OUTPATIENT)
Age: 60
End: 2020-05-26

## 2020-05-26 ENCOUNTER — OUTPATIENT (OUTPATIENT)
Dept: OUTPATIENT SERVICES | Facility: HOSPITAL | Age: 60
LOS: 1 days | Discharge: HOME | End: 2020-05-26
Payer: MEDICARE

## 2020-05-26 VITALS
HEART RATE: 65 BPM | SYSTOLIC BLOOD PRESSURE: 159 MMHG | TEMPERATURE: 98 F | DIASTOLIC BLOOD PRESSURE: 77 MMHG | WEIGHT: 195.11 LBS | HEIGHT: 65 IN | OXYGEN SATURATION: 99 % | RESPIRATION RATE: 16 BRPM

## 2020-05-26 DIAGNOSIS — Z98.890 OTHER SPECIFIED POSTPROCEDURAL STATES: Chronic | ICD-10-CM

## 2020-05-26 DIAGNOSIS — S86.112S: ICD-10-CM

## 2020-05-26 DIAGNOSIS — Z30.2 ENCOUNTER FOR STERILIZATION: Chronic | ICD-10-CM

## 2020-05-26 DIAGNOSIS — M77.9 ENTHESOPATHY, UNSPECIFIED: ICD-10-CM

## 2020-05-26 DIAGNOSIS — Z98.82 BREAST IMPLANT STATUS: Chronic | ICD-10-CM

## 2020-05-26 LAB
ALBUMIN SERPL ELPH-MCNC: 4.4 G/DL — SIGNIFICANT CHANGE UP (ref 3.5–5.2)
ALP SERPL-CCNC: 77 U/L — SIGNIFICANT CHANGE UP (ref 30–115)
ALT FLD-CCNC: 17 U/L — SIGNIFICANT CHANGE UP (ref 0–41)
ANION GAP SERPL CALC-SCNC: 11 MMOL/L — SIGNIFICANT CHANGE UP (ref 7–14)
APTT BLD: 38.6 SEC — SIGNIFICANT CHANGE UP (ref 27–39.2)
AST SERPL-CCNC: 20 U/L — SIGNIFICANT CHANGE UP (ref 0–41)
BASOPHILS # BLD AUTO: 0.02 K/UL — SIGNIFICANT CHANGE UP (ref 0–0.2)
BASOPHILS NFR BLD AUTO: 0.3 % — SIGNIFICANT CHANGE UP (ref 0–1)
BILIRUB SERPL-MCNC: 0.6 MG/DL — SIGNIFICANT CHANGE UP (ref 0.2–1.2)
BUN SERPL-MCNC: 16 MG/DL — SIGNIFICANT CHANGE UP (ref 10–20)
CALCIUM SERPL-MCNC: 9.7 MG/DL — SIGNIFICANT CHANGE UP (ref 8.5–10.1)
CHLORIDE SERPL-SCNC: 101 MMOL/L — SIGNIFICANT CHANGE UP (ref 98–110)
CO2 SERPL-SCNC: 30 MMOL/L — SIGNIFICANT CHANGE UP (ref 17–32)
CREAT SERPL-MCNC: 0.6 MG/DL — LOW (ref 0.7–1.5)
EOSINOPHIL # BLD AUTO: 0.05 K/UL — SIGNIFICANT CHANGE UP (ref 0–0.7)
EOSINOPHIL NFR BLD AUTO: 0.7 % — SIGNIFICANT CHANGE UP (ref 0–8)
GLUCOSE SERPL-MCNC: 100 MG/DL — HIGH (ref 70–99)
HCT VFR BLD CALC: 40.5 % — SIGNIFICANT CHANGE UP (ref 37–47)
HGB BLD-MCNC: 13.4 G/DL — SIGNIFICANT CHANGE UP (ref 12–16)
IMM GRANULOCYTES NFR BLD AUTO: 0.1 % — SIGNIFICANT CHANGE UP (ref 0.1–0.3)
INR BLD: 1.14 RATIO — SIGNIFICANT CHANGE UP (ref 0.65–1.3)
LYMPHOCYTES # BLD AUTO: 2.68 K/UL — SIGNIFICANT CHANGE UP (ref 1.2–3.4)
LYMPHOCYTES # BLD AUTO: 35.3 % — SIGNIFICANT CHANGE UP (ref 20.5–51.1)
MCHC RBC-ENTMCNC: 29.7 PG — SIGNIFICANT CHANGE UP (ref 27–31)
MCHC RBC-ENTMCNC: 33.1 G/DL — SIGNIFICANT CHANGE UP (ref 32–37)
MCV RBC AUTO: 89.8 FL — SIGNIFICANT CHANGE UP (ref 81–99)
MONOCYTES # BLD AUTO: 0.67 K/UL — HIGH (ref 0.1–0.6)
MONOCYTES NFR BLD AUTO: 8.8 % — SIGNIFICANT CHANGE UP (ref 1.7–9.3)
NEUTROPHILS # BLD AUTO: 4.16 K/UL — SIGNIFICANT CHANGE UP (ref 1.4–6.5)
NEUTROPHILS NFR BLD AUTO: 54.8 % — SIGNIFICANT CHANGE UP (ref 42.2–75.2)
NRBC # BLD: 0 /100 WBCS — SIGNIFICANT CHANGE UP (ref 0–0)
PLATELET # BLD AUTO: 257 K/UL — SIGNIFICANT CHANGE UP (ref 130–400)
POTASSIUM SERPL-MCNC: 4.1 MMOL/L — SIGNIFICANT CHANGE UP (ref 3.5–5)
POTASSIUM SERPL-SCNC: 4.1 MMOL/L — SIGNIFICANT CHANGE UP (ref 3.5–5)
PROT SERPL-MCNC: 7.7 G/DL — SIGNIFICANT CHANGE UP (ref 6–8)
PROTHROM AB SERPL-ACNC: 13.1 SEC — HIGH (ref 9.95–12.87)
RBC # BLD: 4.51 M/UL — SIGNIFICANT CHANGE UP (ref 4.2–5.4)
RBC # FLD: 11.9 % — SIGNIFICANT CHANGE UP (ref 11.5–14.5)
SODIUM SERPL-SCNC: 142 MMOL/L — SIGNIFICANT CHANGE UP (ref 135–146)
WBC # BLD: 7.59 K/UL — SIGNIFICANT CHANGE UP (ref 4.8–10.8)
WBC # FLD AUTO: 7.59 K/UL — SIGNIFICANT CHANGE UP (ref 4.8–10.8)

## 2020-05-26 PROCEDURE — 93010 ELECTROCARDIOGRAM REPORT: CPT

## 2020-05-26 PROCEDURE — 71046 X-RAY EXAM CHEST 2 VIEWS: CPT | Mod: 26

## 2020-05-26 PROCEDURE — 73718 MRI LOWER EXTREMITY W/O DYE: CPT | Mod: 26,LT

## 2020-05-26 RX ORDER — MIRABEGRON 50 MG/1
1 TABLET, EXTENDED RELEASE ORAL
Qty: 0 | Refills: 0 | DISCHARGE

## 2020-05-26 RX ORDER — ASCORBIC ACID 60 MG
1 TABLET,CHEWABLE ORAL
Qty: 0 | Refills: 0 | DISCHARGE

## 2020-05-26 RX ORDER — ATORVASTATIN CALCIUM 80 MG/1
1 TABLET, FILM COATED ORAL
Qty: 0 | Refills: 0 | DISCHARGE

## 2020-05-26 RX ORDER — MULTIVIT-MIN/FERROUS GLUCONATE 9 MG/15 ML
1 LIQUID (ML) ORAL
Qty: 0 | Refills: 0 | DISCHARGE

## 2020-05-26 RX ORDER — OMEPRAZOLE 10 MG/1
1 CAPSULE, DELAYED RELEASE ORAL
Qty: 0 | Refills: 0 | DISCHARGE

## 2020-05-26 NOTE — H&P PST ADULT - NSICDXPASTMEDICALHX_GEN_ALL_CORE_FT
PAST MEDICAL HISTORY:  Depressed     Gastroesophageal reflux disease with esophagitis     Hypercholesteremia     Hypertension     Hypothyroid     Obesity (BMI 35.0-39.9 without comorbidity)     Overactive bladder

## 2020-05-26 NOTE — H&P PST ADULT - HISTORY OF PRESENT ILLNESS
58yo female with a history of HTN, HLD, GERD, AMADEO on CPAP, Obesity (BMI  40), hypothyroid, depression, presents for medical clearance for repair of posterior tibial tendon of the left foot  PATIENT CURRENTLY DENIES CHEST PAIN  SHORTNESS OF BREATH  PALPITATIONS,  DYSURIA, OR UPPER RESPIRATORY INFECTION IN PAST 2 WEEKS  EXERCISE  TOLERANCE  1-2 FLIGHT OF STAIRS  WITHOUT SHORTNESS OF BREATH  pt denies any covid s/s, or tested positive in the past 2 weeks  denies travel outside the [place] in the past 30 days 56yo female with a history of HTN, HLD, GERD, AMADEO on CPAP, Obesity (BMI  32.5), hypothyroid, depression, presents for medical clearance for repair of posterior tibial tendon of the left foot  PATIENT CURRENTLY DENIES CHEST PAIN  SHORTNESS OF BREATH  PALPITATIONS,  DYSURIA, OR UPPER RESPIRATORY INFECTION IN PAST 2 WEEKS  EXERCISE  TOLERANCE  1-2 FLIGHT OF STAIRS  WITHOUT SHORTNESS OF BREATH  pt denies any covid s/s, or tested positive in the past 2 weeks  denies travel outside the [place] in the past 30 days

## 2020-05-26 NOTE — H&P PST ADULT - NSICDXFAMILYHX_GEN_ALL_CORE_FT
FAMILY HISTORY:  Mother  Still living? No  CAD (coronary artery disease), Age at diagnosis: Age Unknown  Cancer, Age at diagnosis: Age Unknown    Sibling  Still living? No  Diabetes, Age at diagnosis: Age Unknown

## 2020-05-26 NOTE — H&P PST ADULT - NSICDXPASTSURGICALHX_GEN_ALL_CORE_FT
PAST SURGICAL HISTORY:  Admission for tubal ligation     H/O arthroscopy of left knee 20 yrs ago    H/O breast augmentation     H/O plastic surgery abdominal plasty    H/O right inguinal hernia repair 2014

## 2020-05-28 DIAGNOSIS — Z01.818 ENCOUNTER FOR OTHER PREPROCEDURAL EXAMINATION: ICD-10-CM

## 2020-05-28 DIAGNOSIS — M76.822 POSTERIOR TIBIAL TENDINITIS, LEFT LEG: ICD-10-CM

## 2020-05-28 DIAGNOSIS — Z02.9 ENCOUNTER FOR ADMINISTRATIVE EXAMINATIONS, UNSPECIFIED: ICD-10-CM

## 2020-06-04 ENCOUNTER — APPOINTMENT (OUTPATIENT)
Dept: PODIATRY | Facility: CLINIC | Age: 60
End: 2020-06-04
Payer: MEDICARE

## 2020-06-04 ENCOUNTER — OUTPATIENT (OUTPATIENT)
Dept: OUTPATIENT SERVICES | Facility: HOSPITAL | Age: 60
LOS: 1 days | Discharge: HOME | End: 2020-06-04

## 2020-06-04 DIAGNOSIS — Z98.890 OTHER SPECIFIED POSTPROCEDURAL STATES: Chronic | ICD-10-CM

## 2020-06-04 DIAGNOSIS — Z98.82 BREAST IMPLANT STATUS: Chronic | ICD-10-CM

## 2020-06-04 DIAGNOSIS — M77.9 ENTHESOPATHY, UNSPECIFIED: ICD-10-CM

## 2020-06-04 DIAGNOSIS — Z30.2 ENCOUNTER FOR STERILIZATION: Chronic | ICD-10-CM

## 2020-06-04 PROCEDURE — 99213 OFFICE O/P EST LOW 20 MIN: CPT | Mod: 25

## 2020-06-04 PROCEDURE — 20551 NJX 1 TENDON ORIGIN/INSJ: CPT | Mod: LT

## 2020-06-05 ENCOUNTER — OUTPATIENT (OUTPATIENT)
Dept: OUTPATIENT SERVICES | Facility: HOSPITAL | Age: 60
LOS: 1 days | Discharge: HOME | End: 2020-06-05
Payer: MEDICARE

## 2020-06-05 DIAGNOSIS — Z12.31 ENCOUNTER FOR SCREENING MAMMOGRAM FOR MALIGNANT NEOPLASM OF BREAST: ICD-10-CM

## 2020-06-05 DIAGNOSIS — Z98.890 OTHER SPECIFIED POSTPROCEDURAL STATES: Chronic | ICD-10-CM

## 2020-06-05 DIAGNOSIS — Z98.82 BREAST IMPLANT STATUS: Chronic | ICD-10-CM

## 2020-06-05 DIAGNOSIS — Z30.2 ENCOUNTER FOR STERILIZATION: Chronic | ICD-10-CM

## 2020-06-05 PROCEDURE — 77063 BREAST TOMOSYNTHESIS BI: CPT | Mod: 26

## 2020-06-05 PROCEDURE — 77067 SCR MAMMO BI INCL CAD: CPT | Mod: 26

## 2020-06-06 DIAGNOSIS — Z13.820 ENCOUNTER FOR SCREENING FOR OSTEOPOROSIS: ICD-10-CM

## 2020-06-06 DIAGNOSIS — M89.9 DISORDER OF BONE, UNSPECIFIED: ICD-10-CM

## 2020-06-06 DIAGNOSIS — Z78.0 ASYMPTOMATIC MENOPAUSAL STATE: ICD-10-CM

## 2020-06-09 ENCOUNTER — LABORATORY RESULT (OUTPATIENT)
Age: 60
End: 2020-06-09

## 2020-06-10 NOTE — ASU PREOP CHECKLIST - BP NONINVASIVE DIASTOLIC (MM HG)
[facility] progress notes      Patient: Rose Arauz Date: 6/10/2020   : 1963 Attending: Missy Myers MD   56 year old female      Subjective: doing better, less pain in the legs, no new complaints.      Review of Systems:  Review of Systems   Constitutional: Negative for chills and fever.   HENT: Negative for congestion, mouth sores and sore throat.    Eyes: Negative for visual disturbance.   Respiratory: Negative for cough, shortness of breath and wheezing.    Cardiovascular: Negative for chest pain, palpitations and leg swelling.   Gastrointestinal: Negative for abdominal distention, abdominal pain, diarrhea, nausea and vomiting.   Endocrine: Negative for polyuria.   Genitourinary: Negative for dysuria, flank pain and frequency.   Musculoskeletal: Negative for back pain, joint swelling and myalgias.   Skin: Positive for color change and rash. Negative for wound.   Neurological: Negative for seizures, weakness and headaches.           Vital Last Value 24 Hour Range   Temperature 98.1 °F (36.7 °C) (06/10/20 1333) Temp  Min: 97.9 °F (36.6 °C)  Max: 99.3 °F (37.4 °C)   Pulse 72 (06/10/20 1333) Pulse  Min: 70  Max: 78   Respiratory 16 (06/10/20 1333) Resp  Min: 16  Max: 16   Non-Invasive  Blood Pressure 129/71 (06/10/20 1333) BP  Min: 111/77  Max: 129/71   Pulse Oximetry 98 % (06/10/20 1600) SpO2  Min: 97 %  Max: 98 %   Arterial   Blood Pressure   No data recorded         Intake/Output:  Last Stool Occurrence:        Physical Exam:  Physical Exam   Constitutional: She is oriented to person, place, and time. She appears well-developed and well-nourished.   HENT:   Head: Normocephalic and atraumatic.   Mouth/Throat: Oropharynx is clear and moist.   Eyes: Conjunctivae are normal.   Neck: Neck supple.   Cardiovascular: Normal rate, regular rhythm and normal heart sounds. Exam reveals no gallop.   No murmur heard.  Pulmonary/Chest: Breath sounds normal. No respiratory distress. She has no wheezes.   Abdominal:  Soft. Bowel sounds are normal. She exhibits no mass. There is no abdominal tenderness.   Musculoskeletal:         General: Tenderness present. No deformity or edema.   Lymphadenopathy:     She has no cervical adenopathy.   Neurological: She is alert and oriented to person, place, and time. No cranial nerve deficit.   Skin: Skin is warm. Rash (legs) noted. There is erythema (less erythema on the right leg).   Psychiatric: She has a normal mood and affect. Her behavior is normal.        Laboratory Results:    Lab Results   Component Value Date    SODIUM 137 06/10/2020    POTASSIUM 4.9 06/10/2020    BUN 18 06/10/2020    CREATININE 0.93 06/10/2020    GLUCOSE 107 (H) 06/10/2020    HCT 42.8 06/10/2020    HGB 12.8 06/10/2020     06/10/2020    INR 1.0 06/08/2020    PTT 32 06/08/2020    BILIRUBIN 0.3 06/09/2020    AST 28 06/09/2020    GPT 39 06/09/2020    ALKPT 81 06/09/2020    ALBUMIN 2.8 (L) 06/09/2020    RESR 32 (H) 08/16/2016    WBC 6.3 06/10/2020         Urinalysis:    No results found for: USPG, UTPELC, UWBC, URBC, 5UNITR, UBILI, UPH, UURO, UBACTR    Cultures:   Microbiology Results  (Last 10 results in the past 7 days)    Specimen   Gram Smear   Culture Result   Status      06/08/20  1650   06/08/20  1650  06/08/20  1650     BLOOD RAC   NO GROWTH 2 DAYS. PENDING    BLOOD RHAND   NO GROWTH 2 DAYS. PENDING           Imaging: Reviewed    Assessment:   1. Chronic eczema of both legs with acute exacerbation.  The nature of this   eczema is still unclear, but has a stasis component, always responds to topical steroids     2. Possible cellulitis of the right leg.  3. Stasis dermatitis.      4. 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 None now.  5. Obesity.   6. Chronic leg pain.      Plan:     1. Continue vancomycin IV, pharmacy to dose it.     2. Continue Lotrisone BID to the rashes on the legs.   3. Elevate the right foot above the heart.   4. Patient advised to avoid wrapping the legs  in future.    5. The plan was discussed with the patient in details.   6. Thank you Veronica Butts CNP, for the consultation and if you have any questions, please feel free to call me.      Discussed with: Nurse and Patient   78

## 2020-06-17 PROBLEM — M77.9 TENDONITIS: Status: ACTIVE | Noted: 2020-02-12

## 2020-06-17 NOTE — PHYSICAL EXAM
[General Appearance - In No Acute Distress] : in no acute distress [General Appearance - Alert] : alert [Full Pulse] : the pedal pulses are present [Edema] : there was no peripheral edema [Skin Turgor] : normal skin turgor [Skin Lesions] : no skin lesions [Cranial Nerves] : cranial nerves 2-12 were intact [Deep Tendon Reflexes (DTR)] : deep tendon reflexes were 2+ and symmetric [Oriented To Time, Place, And Person] : oriented to person, place, and time [Impaired Insight] : insight and judgment were intact [Affect] : the affect was normal [FreeTextEntry1] : Pain elicited upon  plantarflexion and dorsiflexion of the left foot, pain on palpation on the PT inseration left foot

## 2020-06-17 NOTE — PROCEDURE
[FreeTextEntry1] : Partial Posterior Tibial Tendon Tear \par \par Pt. seen and evaluated\par Discussed treatment and condition w/ patient\par All risk and benefit of surgery were discussed prior to treatment patient is aware and in agreement of treatment and follow up plan\par MRI of the left foot PT tear at the insertion on US\par Discussed Radiologist findings with Patient in Detail \par Patient made aware of all conditions and is in agreement with follow up treatment plan \par would like conservative treatment at this time \par injection performed with 1% lidocaine and Betamethasone for a total of 1.5cc. Injection performed under sterile technique to area of talonavicular joint \par

## 2020-06-19 DIAGNOSIS — M77.9 ENTHESOPATHY, UNSPECIFIED: ICD-10-CM

## 2020-06-19 DIAGNOSIS — M19.90 UNSPECIFIED OSTEOARTHRITIS, UNSPECIFIED SITE: ICD-10-CM

## 2020-06-19 DIAGNOSIS — S86.112S: ICD-10-CM

## 2020-07-13 ENCOUNTER — APPOINTMENT (OUTPATIENT)
Dept: PODIATRY | Facility: CLINIC | Age: 60
End: 2020-07-13

## 2020-08-04 ENCOUNTER — APPOINTMENT (OUTPATIENT)
Dept: SURGERY | Facility: CLINIC | Age: 60
End: 2020-08-04
Payer: MEDICARE

## 2020-08-04 DIAGNOSIS — M19.90 UNSPECIFIED OSTEOARTHRITIS, UNSPECIFIED SITE: ICD-10-CM

## 2020-08-04 PROCEDURE — 99213 OFFICE O/P EST LOW 20 MIN: CPT

## 2020-08-05 NOTE — DATA REVIEWED
[FreeTextEntry1] : Wt. change since last visit:0\par %EWL: 38\par TWL: 43.8 lbs\par BMI: 33\par \par

## 2020-08-05 NOTE — PLAN
[FreeTextEntry1] : PLAN: RTO in 6 months with blood work.\par            Follow up with pulmonary.\par

## 2020-08-05 NOTE — HISTORY OF PRESENT ILLNESS
[Procedure: ___] : Procedure performed: [unfilled]  [de-identified] : Patient had surgery approximately 2 years ago. \par She denies heartburn/reflux/vomiting and food intolerance.\par She is still not using her CPAP machine. Discussed the risks of AMADEO and recommend that patient follow up with pulmonary.\par  \par  [Date of Surgery: ___] : Date of Surgery:   [unfilled] [Pre-Op Weight ___] : Pre-op weight was [unfilled] lbs [___ Years Post Op] : [unfilled] years

## 2020-08-05 NOTE — ASSESSMENT
[FreeTextEntry1] : SLIME COHEN is a 59 year female seen today for Bariatric follow up visit. Patient states that she feels great but is disappointed with her weight loss.\par Patient states that she is focusing on dietary guidelines and she continues to plan her meals although she sometimes gets a little crazy with her snacks. Breakfast - eggs or a protein shake with a piece of fruit. Lunch - grilled chicken with a mixed green salad.  Dinner - grilled chicken, crab and salmon with vegetables and a small serving of potato or rice. Snack - peanuts, berries or potato chip. Reviewed healthy dietary snacks with patient and encouraged her to eliminate the potato chips. \par Fluid intake is adequate with either water and an extra large cup of coffee with 3-4 sugars or Splenda. recommend that she focus on lower calorie sweeteners.\par She is doing Pilates but not at a very brisk pace. Encouraged patient to increase exercise 3-4 days/week for at least 20-30 minutes either bike riding, walking in place or outdoors.\par \par

## 2020-11-04 ENCOUNTER — RX RENEWAL (OUTPATIENT)
Age: 60
End: 2020-11-04

## 2020-11-18 ENCOUNTER — APPOINTMENT (OUTPATIENT)
Dept: UROGYNECOLOGY | Facility: CLINIC | Age: 60
End: 2020-11-18

## 2020-11-18 ENCOUNTER — NON-APPOINTMENT (OUTPATIENT)
Age: 60
End: 2020-11-18

## 2020-12-16 NOTE — DISCHARGE NOTE ADULT - ADMISSION DATE +STARTOFVISITDATE
Lexapro was DENIED by [employer]. Saurabh advised and states she will try with generic Lexapro. Please send to [facility]. Statement Selected

## 2020-12-30 ENCOUNTER — OUTPATIENT (OUTPATIENT)
Dept: OUTPATIENT SERVICES | Facility: HOSPITAL | Age: 60
LOS: 1 days | Discharge: HOME | End: 2020-12-30

## 2020-12-30 ENCOUNTER — APPOINTMENT (OUTPATIENT)
Dept: UROGYNECOLOGY | Facility: CLINIC | Age: 60
End: 2020-12-30
Payer: MEDICARE

## 2020-12-30 VITALS — HEIGHT: 65 IN | DIASTOLIC BLOOD PRESSURE: 70 MMHG | SYSTOLIC BLOOD PRESSURE: 108 MMHG

## 2020-12-30 DIAGNOSIS — Z98.890 OTHER SPECIFIED POSTPROCEDURAL STATES: Chronic | ICD-10-CM

## 2020-12-30 DIAGNOSIS — Z98.82 BREAST IMPLANT STATUS: Chronic | ICD-10-CM

## 2020-12-30 DIAGNOSIS — Z30.2 ENCOUNTER FOR STERILIZATION: Chronic | ICD-10-CM

## 2020-12-30 PROCEDURE — 99213 OFFICE O/P EST LOW 20 MIN: CPT

## 2020-12-30 NOTE — DISCUSSION/SUMMARY
[FreeTextEntry1] : Mixed Incontinence-\par Continue Vesicare 10mg\par Precautions reviewed.\par Discussed 3rd line treatment options with pt. \par Pt would like to do PTNS\par WIll schedule for UDS and Cysto prior to PTNS\par \par

## 2020-12-30 NOTE — COUNSELING
[FreeTextEntry1] : Schedule bladder function testing (UDS without reduction) with SILVESTRE Altamirano. \par \par Please call the office if you feel like you have an infection because we cannot do the bladder function testing in the setting of an infection.\par \par Please come with a full, not painful bladder.\par \par Please schedule cystoscopy with Dr. Mitchell prior to PTNS. \par \par If you feel like you have an infection it is important for you to call our office and we will arrange testing of your urine.\par \par Please continue taking Vesicare 10mg for frequency. \par \par Please call my office if you have any issues with the cost or side effects of the medication. \par \par Schedule a 6 weeks follow up med check appointment.\par

## 2020-12-30 NOTE — HISTORY OF PRESENT ILLNESS
[FreeTextEntry1] : Patient is here for 6 weeks med check for urge incontinence.\par Last seen on 11/13/2019 for med check.\par \par s/p trospium 20 mg, no change\par s/p Oxybutynin ER 20 mg, side effects.\par s/p Myrbetriq 25 mg (nocturia)\par s/p Myrbetriq 50 mg daily: initially helped, but stopped helping\par s/p Vesicare 5mg: no change\par Vesicare 10mg: \par \par Today, patient states that Vesicare 5mg did not help her and that her PCP increased it to 10mg last week. Pt feels that all meds do not help her and wants to discuss other options.  Denies side effects. Patient does not feel she has an infection.\par \par \par

## 2021-01-26 ENCOUNTER — APPOINTMENT (OUTPATIENT)
Dept: SURGERY | Facility: CLINIC | Age: 61
End: 2021-01-26
Payer: MEDICARE

## 2021-01-27 ENCOUNTER — APPOINTMENT (OUTPATIENT)
Dept: UROGYNECOLOGY | Facility: CLINIC | Age: 61
End: 2021-01-27

## 2021-01-29 ENCOUNTER — APPOINTMENT (OUTPATIENT)
Dept: UROGYNECOLOGY | Facility: CLINIC | Age: 61
End: 2021-01-29

## 2021-02-16 ENCOUNTER — APPOINTMENT (OUTPATIENT)
Dept: SURGERY | Facility: CLINIC | Age: 61
End: 2021-02-16
Payer: MEDICARE

## 2021-02-16 VITALS — TEMPERATURE: 97.3 F | WEIGHT: 193 LBS | BODY MASS INDEX: 32.15 KG/M2 | HEIGHT: 65 IN

## 2021-02-16 PROCEDURE — 99212 OFFICE O/P EST SF 10 MIN: CPT

## 2021-02-16 PROCEDURE — 99072 ADDL SUPL MATRL&STAF TM PHE: CPT

## 2021-02-16 NOTE — PLAN
[FreeTextEntry1] : Plan: Blood work.\par          RTO in 6 months.\par          Call with concerns.\par

## 2021-02-16 NOTE — ASSESSMENT
[FreeTextEntry1] : SLIME COHEN is a 60 year female seen today for Bariatric follow up visit. \par Patient states that she is trying to get back on track and is substituting her meals with protein shakes for 2 weeks. She is drinking plenty of warm teas and broths.\par She plans to include 1 solid meal next week for lunch. \par Fluid intake is adequate with either water and an extra large cup of coffee with Splenda. \par She is doing Pilates on Zoom 3 days/week and is using a  1 day/week  for approximately 45 minutes. She walks outdoors when the weather is good.\par

## 2021-02-16 NOTE — REASON FOR VISIT
[Follow-Up Visit] : a follow-up visit for [S/P Bariatric Surgery] : s/p bariatric surgery [FreeTextEntry2] : RYGB on 8/2/2018

## 2021-02-16 NOTE — HISTORY OF PRESENT ILLNESS
[Procedure: ___] : Procedure performed: [unfilled]  [Date of Surgery: ___] : Date of Surgery:   [unfilled] [Pre-Op Weight ___] : Pre-op weight was [unfilled] lbs [___ Years Post Op] : [unfilled] years [de-identified] : Patient had surgery approximately 3 1/2 years ago. \par She denies heartburn/reflux/vomiting and food intolerance. She c/o having occasional gas pains that is relieved with trinidad tea.\par She is still not using her CPAP machine. Discussed the risks of AMADEO and recommend that patient follow up with pulmonary.\par  \par

## 2021-03-17 ENCOUNTER — APPOINTMENT (OUTPATIENT)
Dept: UROGYNECOLOGY | Facility: CLINIC | Age: 61
End: 2021-03-17
Payer: MEDICARE

## 2021-03-17 ENCOUNTER — OUTPATIENT (OUTPATIENT)
Dept: OUTPATIENT SERVICES | Facility: HOSPITAL | Age: 61
LOS: 1 days | Discharge: HOME | End: 2021-03-17

## 2021-03-17 VITALS — DIASTOLIC BLOOD PRESSURE: 86 MMHG | SYSTOLIC BLOOD PRESSURE: 140 MMHG

## 2021-03-17 DIAGNOSIS — Z98.890 OTHER SPECIFIED POSTPROCEDURAL STATES: Chronic | ICD-10-CM

## 2021-03-17 DIAGNOSIS — N39.46 MIXED INCONTINENCE: ICD-10-CM

## 2021-03-17 DIAGNOSIS — Z30.2 ENCOUNTER FOR STERILIZATION: Chronic | ICD-10-CM

## 2021-03-17 DIAGNOSIS — Z98.82 BREAST IMPLANT STATUS: Chronic | ICD-10-CM

## 2021-03-17 DIAGNOSIS — N39.0 URINARY TRACT INFECTION, SITE NOT SPECIFIED: ICD-10-CM

## 2021-03-17 PROCEDURE — 51741 ELECTRO-UROFLOWMETRY FIRST: CPT | Mod: 26

## 2021-03-17 PROCEDURE — 51728 CYSTOMETROGRAM W/VP: CPT | Mod: 26

## 2021-03-17 PROCEDURE — 51784 ANAL/URINARY MUSCLE STUDY: CPT | Mod: 26

## 2021-03-17 PROCEDURE — 51797 INTRAABDOMINAL PRESSURE TEST: CPT | Mod: 26

## 2021-03-17 RX ORDER — MIRABEGRON 50 MG/1
50 TABLET, FILM COATED, EXTENDED RELEASE ORAL
Qty: 90 | Refills: 3 | Status: DISCONTINUED | COMMUNITY
Start: 2018-04-11 | End: 2021-03-17

## 2021-03-17 RX ORDER — SOLIFENACIN SUCCINATE 5 MG/1
5 TABLET ORAL
Qty: 30 | Refills: 1 | Status: DISCONTINUED | COMMUNITY
Start: 2020-11-18 | End: 2021-03-17

## 2021-03-17 RX ORDER — DICLOFENAC SODIUM 10 MG/G
1 GEL TOPICAL DAILY
Qty: 1 | Refills: 5 | Status: DISCONTINUED | COMMUNITY
Start: 2020-02-12 | End: 2021-03-17

## 2021-03-17 RX ORDER — DICLOFENAC SODIUM 3 G/100G
3 GEL TOPICAL TWICE DAILY
Qty: 1 | Refills: 1 | Status: DISCONTINUED | COMMUNITY
Start: 2020-02-28 | End: 2021-03-17

## 2021-04-27 ENCOUNTER — LABORATORY RESULT (OUTPATIENT)
Age: 61
End: 2021-04-27

## 2021-04-28 ENCOUNTER — APPOINTMENT (OUTPATIENT)
Dept: UROGYNECOLOGY | Facility: CLINIC | Age: 61
End: 2021-04-28
Payer: MEDICARE

## 2021-04-28 ENCOUNTER — OUTPATIENT (OUTPATIENT)
Dept: OUTPATIENT SERVICES | Facility: HOSPITAL | Age: 61
LOS: 1 days | Discharge: HOME | End: 2021-04-28

## 2021-04-28 VITALS — DIASTOLIC BLOOD PRESSURE: 80 MMHG | SYSTOLIC BLOOD PRESSURE: 144 MMHG

## 2021-04-28 DIAGNOSIS — Z30.2 ENCOUNTER FOR STERILIZATION: Chronic | ICD-10-CM

## 2021-04-28 DIAGNOSIS — Z98.82 BREAST IMPLANT STATUS: Chronic | ICD-10-CM

## 2021-04-28 DIAGNOSIS — Z98.890 OTHER SPECIFIED POSTPROCEDURAL STATES: Chronic | ICD-10-CM

## 2021-04-28 LAB
BILIRUB UR QL STRIP: NORMAL
CLARITY UR: CLEAR
COLLECTION METHOD: NORMAL
GLUCOSE UR-MCNC: NORMAL
HCG UR QL: 0.2 EU/DL
HGB UR QL STRIP.AUTO: NORMAL
KETONES UR-MCNC: NORMAL
LEUKOCYTE ESTERASE UR QL STRIP: NORMAL
NITRITE UR QL STRIP: NORMAL
PH UR STRIP: 5
PROT UR STRIP-MCNC: NORMAL
SP GR UR STRIP: 1.01

## 2021-04-28 PROCEDURE — ZZZZZ: CPT

## 2021-04-28 RX ORDER — NITROFURANTOIN (MONOHYDRATE/MACROCRYSTALS) 25; 75 MG/1; MG/1
100 CAPSULE ORAL TWICE DAILY
Qty: 14 | Refills: 0 | Status: DISCONTINUED | COMMUNITY
Start: 2021-03-17 | End: 2021-04-28

## 2021-04-29 LAB
APPEARANCE: CLEAR
BILIRUBIN URINE: NEGATIVE
BLOOD URINE: NEGATIVE
COLOR: NORMAL
GLUCOSE QUALITATIVE U: NEGATIVE
KETONES URINE: NEGATIVE
LEUKOCYTE ESTERASE URINE: ABNORMAL
NITRITE URINE: NEGATIVE
PH URINE: 7
PROTEIN URINE: NEGATIVE
SPECIFIC GRAVITY URINE: 1
UROBILINOGEN URINE: NORMAL

## 2021-05-02 LAB — BACTERIA UR CULT: ABNORMAL

## 2021-05-03 ENCOUNTER — NON-APPOINTMENT (OUTPATIENT)
Age: 61
End: 2021-05-03

## 2021-05-07 ENCOUNTER — EMERGENCY (EMERGENCY)
Facility: HOSPITAL | Age: 61
LOS: 0 days | Discharge: HOME | End: 2021-05-07
Attending: EMERGENCY MEDICINE | Admitting: EMERGENCY MEDICINE
Payer: MEDICARE

## 2021-05-07 VITALS
TEMPERATURE: 97 F | HEIGHT: 65 IN | WEIGHT: 184.97 LBS | RESPIRATION RATE: 20 BRPM | HEART RATE: 62 BPM | OXYGEN SATURATION: 100 % | DIASTOLIC BLOOD PRESSURE: 84 MMHG | SYSTOLIC BLOOD PRESSURE: 154 MMHG

## 2021-05-07 DIAGNOSIS — Z98.890 OTHER SPECIFIED POSTPROCEDURAL STATES: Chronic | ICD-10-CM

## 2021-05-07 DIAGNOSIS — Z98.82 BREAST IMPLANT STATUS: ICD-10-CM

## 2021-05-07 DIAGNOSIS — N39.0 URINARY TRACT INFECTION, SITE NOT SPECIFIED: ICD-10-CM

## 2021-05-07 DIAGNOSIS — K21.00 GASTRO-ESOPHAGEAL REFLUX DISEASE WITH ESOPHAGITIS, WITHOUT BLEEDING: ICD-10-CM

## 2021-05-07 DIAGNOSIS — E03.9 HYPOTHYROIDISM, UNSPECIFIED: ICD-10-CM

## 2021-05-07 DIAGNOSIS — Z30.2 ENCOUNTER FOR STERILIZATION: ICD-10-CM

## 2021-05-07 DIAGNOSIS — F32.9 MAJOR DEPRESSIVE DISORDER, SINGLE EPISODE, UNSPECIFIED: ICD-10-CM

## 2021-05-07 DIAGNOSIS — Z98.82 BREAST IMPLANT STATUS: Chronic | ICD-10-CM

## 2021-05-07 DIAGNOSIS — R23.0 CYANOSIS: ICD-10-CM

## 2021-05-07 DIAGNOSIS — Z79.899 OTHER LONG TERM (CURRENT) DRUG THERAPY: ICD-10-CM

## 2021-05-07 DIAGNOSIS — E78.00 PURE HYPERCHOLESTEROLEMIA, UNSPECIFIED: ICD-10-CM

## 2021-05-07 DIAGNOSIS — I10 ESSENTIAL (PRIMARY) HYPERTENSION: ICD-10-CM

## 2021-05-07 DIAGNOSIS — Z88.1 ALLERGY STATUS TO OTHER ANTIBIOTIC AGENTS STATUS: ICD-10-CM

## 2021-05-07 DIAGNOSIS — Z30.2 ENCOUNTER FOR STERILIZATION: Chronic | ICD-10-CM

## 2021-05-07 DIAGNOSIS — E66.9 OBESITY, UNSPECIFIED: ICD-10-CM

## 2021-05-07 PROCEDURE — 99282 EMERGENCY DEPT VISIT SF MDM: CPT

## 2021-05-07 NOTE — ED ADULT TRIAGE NOTE - CHIEF COMPLAINT QUOTE
Pt states "My hands are purple." Pt has dark stain on hands and underneath fingernails, states "now I think it's from my shirt dye."

## 2021-05-07 NOTE — ED PROVIDER NOTE - NS ED ROS FT
Constitutional: No fevers.   Eyes:  No visual changes, eye pain or discharge.  ENMT:  No sore throat or runny nose.  MS:  No myalgia, muscle weakness, joint pain or back pain.  Neuro:  no numbness/tingling.   Skin:  +skin discoloration.   Endocrine: hx of hypothyroidism.

## 2021-05-07 NOTE — ED PROVIDER NOTE - PATIENT PORTAL LINK FT
You can access the FollowMyHealth Patient Portal offered by [facility] by registering at the following website: [email or web address]. By joining [facility]’s FollowMyHealth portal, you will also be able to view your health information using other applications (apps) compatible with our system.

## 2021-05-07 NOTE — ED PROVIDER NOTE - NSFOLLOWUPINSTRUCTIONS_ED_ALL_ED_FT
PLEASE WASH ARMS WITH SOAP AND WATER.  FOLLOW UP WITH YOUR PRIMARY CARE PHYSICIANS.   IF NEW SYMPTOMS DEVELOP, RETURN TO EMERGENCY DEPARTMENT FOR FURTHER EVALUATION.

## 2021-05-07 NOTE — ED PROVIDER NOTE - PHYSICAL EXAMINATION
CONSTITUTIONAL: Well-developed; well-nourished; in no acute distress.   SKIN: blueish color to both palms as well as anterior aspect of bilateral forearms.   HEAD: Normocephalic; atraumatic.  EYES: EOMI, no conjunctival erythema  EXT: Normal ROM.  see skin**  NEURO: Alert, oriented, grossly unremarkable  PSYCH: Cooperative, appropriate.

## 2021-05-07 NOTE — ED PROVIDER NOTE - CLINICAL SUMMARY MEDICAL DECISION MAKING FREE TEXT BOX
pt presented with blueish color dye to hands and forearm easily removed in ed, has new tie dye shirt, aware of signs and symptoms to return for, will follow up as discussed, reports would like to clean if off at home.

## 2021-05-07 NOTE — ED PROVIDER NOTE - OBJECTIVE STATEMENT
Patient is a 59 yo F w/ hx of HTN, hypothyroidism, depression p/w blue discoloration of hands and arm since yesterday. no trauma. no pain. wearing a new tie-dye shirt. Tried to take off the color at home but did not come off.

## 2021-05-07 NOTE — ED PROVIDER NOTE - PROGRESS NOTE DETAILS
DC: Blue color removed with alcohol wipe in ED. Most probably dye from shirt. ATTENDING NOTE: I personally evaluated the patient. I reviewed the Resident’s note (as assigned above), and agree with the findings and plan except as documented in my note.   61 y/o F with PMH of HTN, hypothyroidism, and depression, bought a new Tie dye shirt, was driving and noticed b/l forearms and palms had a bluish color to them. Pt reports she was not able to wash it off so came to the ED. No fever, chills, n/v, cp, sob, pleuritic cp, palpitations, diaphoresis, cough, ha/lh/dizziness, numbness/tingling, neck pain/ stiffness, abd pain, diarrhea, constipation, melena/brbpr, urinary symptoms, trauma, weakness, edema, calf pain/swelling/erythema, sick contacts, recent travel or rash.   Vital Signs: I have reviewed the initial vital signs. Constitutional: WDWN in nad. Sitting on stretcher in nad. Integumentary: No rash. (+) Blue die to b/l palms and the anterior aspect of her forearms, easily cleaned with alcohol pads. ENT: MMM NECK: Supple, non-tender, no meningeal signs. Cardiovascular: RRR, radial pulses 2/4 b/l. No JVD. Respiratory: BS present b/l, ctabl, no wheezing or crackles, good resp effort and excursion, good air exchange,  no accessory muscle use, no stridor. Gastrointestinal: BS present throughout all 4 quadrants, soft, nd, nt, no rebound tenderness or guarding, no cvat. Musculoskeletal: FROM, no edema, no calf pain/swelling/erythema. Neurologic: AAOx3, motor 5/5 and sensation intact throughout UE and LE ext, CN II-XII intact, No facial droop or slurring of speech. No focal deficits.  Extensive conversation had regarding how to remove dye, pt comfortable with plan and is eager to go home. Pt will go home and continue to wash off as discussed, as preferred by pt. ATTENDING NOTE: I personally evaluated the patient. I reviewed the Resident’s note (as assigned above), and agree with the findings and plan except as documented in my note.   59 y/o F with PMH of HTN, hypothyroidism, and depression, bought a new Tie dye shirt, was driving and noticed b/l forearms and palms had a bluish color to them. Pt reports she was not able to wash it off so came to the ED. No fever, chills, n/v, cp, sob, pleuritic cp, palpitations, diaphoresis, cough, ha/lh/dizziness, numbness/tingling, neck pain/ stiffness, abd pain, diarrhea, constipation, melena/brbpr, urinary symptoms, trauma, weakness, edema, calf pain/swelling/erythema, sick contacts, recent travel or rash.   Vital Signs: I have reviewed the initial vital signs. Constitutional: WDWN in nad. Sitting on stretcher in nad. Integumentary: No rash. (+) Blue die to b/l palms and the anterior aspect of her forearms, easily cleaned with alcohol pads. ENT: MMM NECK: Supple, non-tender, no meningeal signs. Cardiovascular: RRR, radial pulses 2/4 b/l. No JVD. Respiratory: BS present b/l, ctabl, no wheezing or crackles,  no accessory muscle use, no stridor. Gastrointestinal: BS present throughout all 4 quadrants, soft, nd, nt, no rebound tenderness or guarding, no cvat. Musculoskeletal: FROM, no edema, no calf pain/swelling/erythema. Neurologic: AAOx3, motor 5/5 and sensation intact throughout UE and LE ext, CN II-XII intact, No facial droop or slurring of speech. No focal deficits.  Extensive conversation had regarding how to remove dye, pt comfortable with plan and is eager to go home. Pt will go home and continue to wash off as discussed, as preferred by pt.

## 2021-05-26 ENCOUNTER — NON-APPOINTMENT (OUTPATIENT)
Age: 61
End: 2021-05-26

## 2021-05-28 ENCOUNTER — APPOINTMENT (OUTPATIENT)
Dept: UROGYNECOLOGY | Facility: CLINIC | Age: 61
End: 2021-05-28
Payer: MEDICARE

## 2021-05-28 ENCOUNTER — OUTPATIENT (OUTPATIENT)
Dept: OUTPATIENT SERVICES | Facility: HOSPITAL | Age: 61
LOS: 1 days | Discharge: HOME | End: 2021-05-28

## 2021-05-28 VITALS — WEIGHT: 185 LBS | DIASTOLIC BLOOD PRESSURE: 80 MMHG | SYSTOLIC BLOOD PRESSURE: 140 MMHG | BODY MASS INDEX: 30.79 KG/M2

## 2021-05-28 DIAGNOSIS — Z87.898 PERSONAL HISTORY OF OTHER SPECIFIED CONDITIONS: ICD-10-CM

## 2021-05-28 DIAGNOSIS — M79.604 PAIN IN RIGHT LEG: ICD-10-CM

## 2021-05-28 DIAGNOSIS — Z30.2 ENCOUNTER FOR STERILIZATION: Chronic | ICD-10-CM

## 2021-05-28 DIAGNOSIS — Z87.39 PERSONAL HISTORY OF OTHER DISEASES OF THE MUSCULOSKELETAL SYSTEM AND CONNECTIVE TISSUE: ICD-10-CM

## 2021-05-28 DIAGNOSIS — S86.112S STRAIN OF OTHER MUSCLE(S) AND TENDON(S) OF POSTERIOR MUSCLE GROUP AT LOWER LEG LEVEL, LEFT LEG, SEQUELA: ICD-10-CM

## 2021-05-28 DIAGNOSIS — S93.621A SPRAIN OF TARSOMETATARSAL LIGAMENT OF RIGHT FOOT, INITIAL ENCOUNTER: ICD-10-CM

## 2021-05-28 DIAGNOSIS — Z98.82 BREAST IMPLANT STATUS: Chronic | ICD-10-CM

## 2021-05-28 DIAGNOSIS — Z78.9 OTHER SPECIFIED HEALTH STATUS: ICD-10-CM

## 2021-05-28 DIAGNOSIS — Z86.018 PERSONAL HISTORY OF OTHER BENIGN NEOPLASM: ICD-10-CM

## 2021-05-28 DIAGNOSIS — Z98.890 OTHER SPECIFIED POSTPROCEDURAL STATES: Chronic | ICD-10-CM

## 2021-05-28 DIAGNOSIS — M79.89 OTHER SPECIFIED SOFT TISSUE DISORDERS: ICD-10-CM

## 2021-05-28 DIAGNOSIS — M79.605 PAIN IN RIGHT LEG: ICD-10-CM

## 2021-05-28 PROCEDURE — 52000 CYSTOURETHROSCOPY: CPT

## 2021-06-22 ENCOUNTER — APPOINTMENT (OUTPATIENT)
Dept: UROGYNECOLOGY | Facility: CLINIC | Age: 61
End: 2021-06-22

## 2021-06-22 ENCOUNTER — OUTPATIENT (OUTPATIENT)
Dept: OUTPATIENT SERVICES | Facility: HOSPITAL | Age: 61
LOS: 1 days | Discharge: HOME | End: 2021-06-22

## 2021-06-22 ENCOUNTER — RESULT CHARGE (OUTPATIENT)
Age: 61
End: 2021-06-22

## 2021-06-22 ENCOUNTER — APPOINTMENT (OUTPATIENT)
Dept: UROGYNECOLOGY | Facility: CLINIC | Age: 61
End: 2021-06-22
Payer: MEDICARE

## 2021-06-22 VITALS
DIASTOLIC BLOOD PRESSURE: 80 MMHG | SYSTOLIC BLOOD PRESSURE: 130 MMHG | WEIGHT: 180 LBS | HEIGHT: 65 IN | BODY MASS INDEX: 29.99 KG/M2

## 2021-06-22 DIAGNOSIS — Z98.82 BREAST IMPLANT STATUS: Chronic | ICD-10-CM

## 2021-06-22 DIAGNOSIS — Z98.890 OTHER SPECIFIED POSTPROCEDURAL STATES: Chronic | ICD-10-CM

## 2021-06-22 DIAGNOSIS — Z30.2 ENCOUNTER FOR STERILIZATION: Chronic | ICD-10-CM

## 2021-06-22 LAB
BILIRUB UR QL STRIP: NORMAL
CLARITY UR: CLEAR
COLLECTION METHOD: NORMAL
GLUCOSE UR-MCNC: NORMAL
HCG UR QL: 0.2 EU/DL
HGB UR QL STRIP.AUTO: NORMAL
KETONES UR-MCNC: NORMAL
LEUKOCYTE ESTERASE UR QL STRIP: NORMAL
NITRITE UR QL STRIP: NORMAL
PH UR STRIP: 6
PROT UR STRIP-MCNC: NORMAL
SP GR UR STRIP: 1.02

## 2021-06-22 PROCEDURE — 64566 NEUROELTRD STIM POST TIBIAL: CPT

## 2021-06-29 ENCOUNTER — OUTPATIENT (OUTPATIENT)
Dept: OUTPATIENT SERVICES | Facility: HOSPITAL | Age: 61
LOS: 1 days | Discharge: HOME | End: 2021-06-29

## 2021-06-29 ENCOUNTER — APPOINTMENT (OUTPATIENT)
Dept: UROGYNECOLOGY | Facility: CLINIC | Age: 61
End: 2021-06-29
Payer: MEDICARE

## 2021-06-29 VITALS
BODY MASS INDEX: 29.99 KG/M2 | DIASTOLIC BLOOD PRESSURE: 72 MMHG | WEIGHT: 180 LBS | SYSTOLIC BLOOD PRESSURE: 124 MMHG | HEIGHT: 65 IN

## 2021-06-29 DIAGNOSIS — Z98.82 BREAST IMPLANT STATUS: Chronic | ICD-10-CM

## 2021-06-29 DIAGNOSIS — Z30.2 ENCOUNTER FOR STERILIZATION: Chronic | ICD-10-CM

## 2021-06-29 DIAGNOSIS — Z98.890 OTHER SPECIFIED POSTPROCEDURAL STATES: Chronic | ICD-10-CM

## 2021-06-29 PROCEDURE — 64566 NEUROELTRD STIM POST TIBIAL: CPT

## 2021-07-06 ENCOUNTER — APPOINTMENT (OUTPATIENT)
Dept: UROGYNECOLOGY | Facility: CLINIC | Age: 61
End: 2021-07-06
Payer: MEDICARE

## 2021-07-06 ENCOUNTER — OUTPATIENT (OUTPATIENT)
Dept: OUTPATIENT SERVICES | Facility: HOSPITAL | Age: 61
LOS: 1 days | Discharge: HOME | End: 2021-07-06

## 2021-07-06 VITALS
WEIGHT: 180 LBS | BODY MASS INDEX: 29.99 KG/M2 | HEIGHT: 65 IN | DIASTOLIC BLOOD PRESSURE: 74 MMHG | SYSTOLIC BLOOD PRESSURE: 128 MMHG

## 2021-07-06 DIAGNOSIS — Z30.2 ENCOUNTER FOR STERILIZATION: Chronic | ICD-10-CM

## 2021-07-06 DIAGNOSIS — Z98.890 OTHER SPECIFIED POSTPROCEDURAL STATES: Chronic | ICD-10-CM

## 2021-07-06 DIAGNOSIS — Z98.82 BREAST IMPLANT STATUS: Chronic | ICD-10-CM

## 2021-07-06 PROCEDURE — 64566 NEUROELTRD STIM POST TIBIAL: CPT

## 2021-07-13 ENCOUNTER — OUTPATIENT (OUTPATIENT)
Dept: OUTPATIENT SERVICES | Facility: HOSPITAL | Age: 61
LOS: 1 days | Discharge: HOME | End: 2021-07-13

## 2021-07-13 ENCOUNTER — APPOINTMENT (OUTPATIENT)
Dept: UROGYNECOLOGY | Facility: CLINIC | Age: 61
End: 2021-07-13
Payer: MEDICARE

## 2021-07-13 VITALS
WEIGHT: 180 LBS | HEIGHT: 65 IN | SYSTOLIC BLOOD PRESSURE: 140 MMHG | DIASTOLIC BLOOD PRESSURE: 80 MMHG | BODY MASS INDEX: 29.99 KG/M2

## 2021-07-13 DIAGNOSIS — Z98.890 OTHER SPECIFIED POSTPROCEDURAL STATES: Chronic | ICD-10-CM

## 2021-07-13 DIAGNOSIS — Z98.82 BREAST IMPLANT STATUS: Chronic | ICD-10-CM

## 2021-07-13 DIAGNOSIS — Z30.2 ENCOUNTER FOR STERILIZATION: Chronic | ICD-10-CM

## 2021-07-13 PROCEDURE — 64566 NEUROELTRD STIM POST TIBIAL: CPT

## 2021-07-21 ENCOUNTER — OUTPATIENT (OUTPATIENT)
Dept: OUTPATIENT SERVICES | Facility: HOSPITAL | Age: 61
LOS: 1 days | Discharge: HOME | End: 2021-07-21

## 2021-07-21 ENCOUNTER — APPOINTMENT (OUTPATIENT)
Dept: UROGYNECOLOGY | Facility: CLINIC | Age: 61
End: 2021-07-21
Payer: MEDICARE

## 2021-07-21 VITALS
BODY MASS INDEX: 29.82 KG/M2 | WEIGHT: 179 LBS | SYSTOLIC BLOOD PRESSURE: 140 MMHG | DIASTOLIC BLOOD PRESSURE: 80 MMHG | HEIGHT: 65 IN

## 2021-07-21 DIAGNOSIS — Z98.890 OTHER SPECIFIED POSTPROCEDURAL STATES: Chronic | ICD-10-CM

## 2021-07-21 DIAGNOSIS — Z98.82 BREAST IMPLANT STATUS: Chronic | ICD-10-CM

## 2021-07-21 DIAGNOSIS — Z30.2 ENCOUNTER FOR STERILIZATION: Chronic | ICD-10-CM

## 2021-07-21 PROCEDURE — 64566 NEUROELTRD STIM POST TIBIAL: CPT

## 2021-07-27 ENCOUNTER — APPOINTMENT (OUTPATIENT)
Dept: SURGERY | Facility: CLINIC | Age: 61
End: 2021-07-27
Payer: MEDICARE

## 2021-07-27 ENCOUNTER — APPOINTMENT (OUTPATIENT)
Dept: UROGYNECOLOGY | Facility: CLINIC | Age: 61
End: 2021-07-27
Payer: MEDICARE

## 2021-07-27 ENCOUNTER — OUTPATIENT (OUTPATIENT)
Dept: OUTPATIENT SERVICES | Facility: HOSPITAL | Age: 61
LOS: 1 days | Discharge: HOME | End: 2021-07-27

## 2021-07-27 VITALS — WEIGHT: 179.8 LBS | BODY MASS INDEX: 29.96 KG/M2 | TEMPERATURE: 97.3 F | HEIGHT: 65 IN

## 2021-07-27 VITALS — SYSTOLIC BLOOD PRESSURE: 130 MMHG | DIASTOLIC BLOOD PRESSURE: 84 MMHG | WEIGHT: 180 LBS | BODY MASS INDEX: 29.95 KG/M2

## 2021-07-27 DIAGNOSIS — Z98.890 OTHER SPECIFIED POSTPROCEDURAL STATES: Chronic | ICD-10-CM

## 2021-07-27 DIAGNOSIS — Z30.2 ENCOUNTER FOR STERILIZATION: Chronic | ICD-10-CM

## 2021-07-27 DIAGNOSIS — G47.33 OBSTRUCTIVE SLEEP APNEA (ADULT) (PEDIATRIC): ICD-10-CM

## 2021-07-27 DIAGNOSIS — Z98.82 BREAST IMPLANT STATUS: Chronic | ICD-10-CM

## 2021-07-27 PROCEDURE — 64566 NEUROELTRD STIM POST TIBIAL: CPT

## 2021-07-27 PROCEDURE — 99212 OFFICE O/P EST SF 10 MIN: CPT

## 2021-07-27 NOTE — HISTORY OF PRESENT ILLNESS
[Procedure: ___] : Procedure performed: [unfilled]  [Date of Surgery: ___] : Date of Surgery:   [unfilled] [Pre-Op Weight ___] : Pre-op weight was [unfilled] lbs [___ Years Post Op] : [unfilled] years [de-identified] : Patient had surgery approximately 4 years ago. \par She denies heartburn/reflux/vomiting and food intolerance. She c/o having occasional gas pains that is relieved with trinidad tea. Patient states that she has identified the trigger foods which are mainly broccoli and chili pepper.\par She is still not using her CPAP machine. \par

## 2021-07-27 NOTE — ASSESSMENT
[FreeTextEntry1] : SLIME COHEN is a 60 year female seen today for Bariatric follow up visit. Patient states that she is in a much better place mentally and is focusing on self care. Her weight has been stable.\par Patient continues to plan her meals.\par Breakfast - protein shakes, oatmeal. Lunch - mixed green salad with seafood salad or turkey/cheese sandwich.\par Dinner - grilled chicken, fish or lamb chops with grilled vegetables.\par Fluid intake is adequate with either water and an extra large cup of coffee with Splenda. \par She is doing Pilates on Zoom 3 days/week and is using a  1 day/week for approximately 45 minutes. \par  \par

## 2021-07-27 NOTE — PLAN
[FreeTextEntry1] : Plan: Continue with diet and exercise.\par          RTO in 1 year.\par          Call with concerns.

## 2021-08-03 ENCOUNTER — APPOINTMENT (OUTPATIENT)
Dept: UROGYNECOLOGY | Facility: CLINIC | Age: 61
End: 2021-08-03
Payer: MEDICARE

## 2021-08-03 ENCOUNTER — OUTPATIENT (OUTPATIENT)
Dept: OUTPATIENT SERVICES | Facility: HOSPITAL | Age: 61
LOS: 1 days | Discharge: HOME | End: 2021-08-03

## 2021-08-03 VITALS
WEIGHT: 178 LBS | BODY MASS INDEX: 29.66 KG/M2 | DIASTOLIC BLOOD PRESSURE: 80 MMHG | SYSTOLIC BLOOD PRESSURE: 140 MMHG | HEIGHT: 65 IN

## 2021-08-03 DIAGNOSIS — Z98.82 BREAST IMPLANT STATUS: Chronic | ICD-10-CM

## 2021-08-03 DIAGNOSIS — Z30.2 ENCOUNTER FOR STERILIZATION: Chronic | ICD-10-CM

## 2021-08-03 DIAGNOSIS — Z98.890 OTHER SPECIFIED POSTPROCEDURAL STATES: Chronic | ICD-10-CM

## 2021-08-03 PROCEDURE — 64566 NEUROELTRD STIM POST TIBIAL: CPT

## 2021-08-06 NOTE — PRE-ANESTHESIA EVALUATION ADULT - NSANTHTOBACCOSD_GEN_ALL_CORE
Patient comes to clinic for follow up anticoagulation visit.  Last INR on 7/19/21 was 2.3.  Dose maintained.   Today's INR is 2.6 and is within goal range.    Current warfarin total weekly dose of 24mg verified.  Informed the INR result is within therapeutic range and instructed to maintain current dose. Discussed dose and return date of 9/2/21 for next INR. See Anticoagulation flowsheet.    Today INR is 2.6, in range and up from previous 2.3, in two weeks continuing on dose of 24mg/wk. Denies missing any doses. Denies s&s of bleeding. Denies changes in diet/med. Instructed pt to continue dose of 24mg/wk and recheck INR in 4 weeks d/t dose stability.    Maicol Hwang is in the office today supervising the treatment. Today's Onsite Provider is Dr Hwang for billing purposes.    Instructed to contact the clinic with any unusual bleeding or bruising, any changes in medications, diet, health status, lifestyle, or any other changes, questions or concerns. Verbalized understanding of all discussed.     
No/former 30 pk year (quit 2003)

## 2021-08-10 ENCOUNTER — APPOINTMENT (OUTPATIENT)
Dept: UROGYNECOLOGY | Facility: CLINIC | Age: 61
End: 2021-08-10
Payer: MEDICARE

## 2021-08-11 ENCOUNTER — NON-APPOINTMENT (OUTPATIENT)
Age: 61
End: 2021-08-11

## 2021-08-11 ENCOUNTER — APPOINTMENT (OUTPATIENT)
Dept: UROGYNECOLOGY | Facility: CLINIC | Age: 61
End: 2021-08-11
Payer: MEDICARE

## 2021-08-11 ENCOUNTER — OUTPATIENT (OUTPATIENT)
Dept: OUTPATIENT SERVICES | Facility: HOSPITAL | Age: 61
LOS: 1 days | Discharge: HOME | End: 2021-08-11

## 2021-08-11 VITALS
WEIGHT: 178 LBS | SYSTOLIC BLOOD PRESSURE: 124 MMHG | HEIGHT: 65 IN | DIASTOLIC BLOOD PRESSURE: 78 MMHG | BODY MASS INDEX: 29.66 KG/M2

## 2021-08-11 DIAGNOSIS — Z98.82 BREAST IMPLANT STATUS: Chronic | ICD-10-CM

## 2021-08-11 DIAGNOSIS — Z98.890 OTHER SPECIFIED POSTPROCEDURAL STATES: Chronic | ICD-10-CM

## 2021-08-11 DIAGNOSIS — Z30.2 ENCOUNTER FOR STERILIZATION: Chronic | ICD-10-CM

## 2021-08-11 PROCEDURE — 64566 NEUROELTRD STIM POST TIBIAL: CPT

## 2021-08-17 ENCOUNTER — OUTPATIENT (OUTPATIENT)
Dept: OUTPATIENT SERVICES | Facility: HOSPITAL | Age: 61
LOS: 1 days | Discharge: HOME | End: 2021-08-17

## 2021-08-17 ENCOUNTER — APPOINTMENT (OUTPATIENT)
Dept: UROGYNECOLOGY | Facility: CLINIC | Age: 61
End: 2021-08-17
Payer: MEDICARE

## 2021-08-17 VITALS
DIASTOLIC BLOOD PRESSURE: 82 MMHG | WEIGHT: 178 LBS | BODY MASS INDEX: 29.66 KG/M2 | SYSTOLIC BLOOD PRESSURE: 146 MMHG | HEIGHT: 65 IN

## 2021-08-17 DIAGNOSIS — Z98.82 BREAST IMPLANT STATUS: Chronic | ICD-10-CM

## 2021-08-17 DIAGNOSIS — Z98.890 OTHER SPECIFIED POSTPROCEDURAL STATES: Chronic | ICD-10-CM

## 2021-08-17 DIAGNOSIS — Z30.2 ENCOUNTER FOR STERILIZATION: Chronic | ICD-10-CM

## 2021-08-17 PROCEDURE — 64566 NEUROELTRD STIM POST TIBIAL: CPT

## 2021-08-24 ENCOUNTER — APPOINTMENT (OUTPATIENT)
Dept: UROGYNECOLOGY | Facility: CLINIC | Age: 61
End: 2021-08-24
Payer: MEDICARE

## 2021-08-24 ENCOUNTER — OUTPATIENT (OUTPATIENT)
Dept: OUTPATIENT SERVICES | Facility: HOSPITAL | Age: 61
LOS: 1 days | Discharge: HOME | End: 2021-08-24

## 2021-08-24 VITALS — BODY MASS INDEX: 29.45 KG/M2 | DIASTOLIC BLOOD PRESSURE: 85 MMHG | SYSTOLIC BLOOD PRESSURE: 140 MMHG | WEIGHT: 177 LBS

## 2021-08-24 DIAGNOSIS — Z30.2 ENCOUNTER FOR STERILIZATION: Chronic | ICD-10-CM

## 2021-08-24 DIAGNOSIS — Z98.890 OTHER SPECIFIED POSTPROCEDURAL STATES: Chronic | ICD-10-CM

## 2021-08-24 DIAGNOSIS — Z98.82 BREAST IMPLANT STATUS: Chronic | ICD-10-CM

## 2021-08-24 PROCEDURE — 64566 NEUROELTRD STIM POST TIBIAL: CPT

## 2021-08-31 ENCOUNTER — APPOINTMENT (OUTPATIENT)
Dept: UROGYNECOLOGY | Facility: CLINIC | Age: 61
End: 2021-08-31
Payer: MEDICARE

## 2021-08-31 ENCOUNTER — OUTPATIENT (OUTPATIENT)
Dept: OUTPATIENT SERVICES | Facility: HOSPITAL | Age: 61
LOS: 1 days | Discharge: HOME | End: 2021-08-31

## 2021-08-31 VITALS — WEIGHT: 177 LBS | DIASTOLIC BLOOD PRESSURE: 58 MMHG | BODY MASS INDEX: 29.45 KG/M2 | SYSTOLIC BLOOD PRESSURE: 150 MMHG

## 2021-08-31 DIAGNOSIS — Z98.890 OTHER SPECIFIED POSTPROCEDURAL STATES: Chronic | ICD-10-CM

## 2021-08-31 DIAGNOSIS — Z30.2 ENCOUNTER FOR STERILIZATION: Chronic | ICD-10-CM

## 2021-08-31 DIAGNOSIS — Z98.82 BREAST IMPLANT STATUS: Chronic | ICD-10-CM

## 2021-08-31 PROCEDURE — 64566 NEUROELTRD STIM POST TIBIAL: CPT

## 2021-09-07 ENCOUNTER — APPOINTMENT (OUTPATIENT)
Dept: UROGYNECOLOGY | Facility: CLINIC | Age: 61
End: 2021-09-07

## 2021-09-14 ENCOUNTER — NON-APPOINTMENT (OUTPATIENT)
Age: 61
End: 2021-09-14

## 2021-09-15 ENCOUNTER — NON-APPOINTMENT (OUTPATIENT)
Age: 61
End: 2021-09-15

## 2021-09-17 RX ORDER — FESOTERODINE FUMARATE 8 MG/1
8 TABLET, FILM COATED, EXTENDED RELEASE ORAL
Qty: 30 | Refills: 1 | Status: DISCONTINUED | COMMUNITY
Start: 2021-09-08 | End: 2021-09-17

## 2021-11-03 ENCOUNTER — OUTPATIENT (OUTPATIENT)
Dept: OUTPATIENT SERVICES | Facility: HOSPITAL | Age: 61
LOS: 1 days | Discharge: HOME | End: 2021-11-03

## 2021-11-03 ENCOUNTER — APPOINTMENT (OUTPATIENT)
Dept: UROGYNECOLOGY | Facility: CLINIC | Age: 61
End: 2021-11-03
Payer: MEDICARE

## 2021-11-03 ENCOUNTER — APPOINTMENT (OUTPATIENT)
Dept: UROGYNECOLOGY | Facility: CLINIC | Age: 61
End: 2021-11-03

## 2021-11-03 VITALS — BODY MASS INDEX: 28.96 KG/M2 | WEIGHT: 174 LBS | SYSTOLIC BLOOD PRESSURE: 130 MMHG | DIASTOLIC BLOOD PRESSURE: 85 MMHG

## 2021-11-03 DIAGNOSIS — Z30.2 ENCOUNTER FOR STERILIZATION: Chronic | ICD-10-CM

## 2021-11-03 DIAGNOSIS — Z98.890 OTHER SPECIFIED POSTPROCEDURAL STATES: Chronic | ICD-10-CM

## 2021-11-03 DIAGNOSIS — Z98.82 BREAST IMPLANT STATUS: Chronic | ICD-10-CM

## 2021-11-03 PROCEDURE — 99213 OFFICE O/P EST LOW 20 MIN: CPT

## 2021-11-03 NOTE — COUNSELING
[FreeTextEntry1] : Schedule surgical counseling appointment with Dr Mitchell. \par \par Happy Holidays!

## 2021-11-03 NOTE — DISCUSSION/SUMMARY
[FreeTextEntry1] : Mixed Incontinence\par Schedule surigcal counseling with Dr Mitchell to review and schedule Botox injections. \par Follow up post op .

## 2021-11-17 ENCOUNTER — OUTPATIENT (OUTPATIENT)
Dept: OUTPATIENT SERVICES | Facility: HOSPITAL | Age: 61
LOS: 1 days | Discharge: HOME | End: 2021-11-17

## 2021-11-17 ENCOUNTER — APPOINTMENT (OUTPATIENT)
Dept: UROGYNECOLOGY | Facility: CLINIC | Age: 61
End: 2021-11-17
Payer: MEDICARE

## 2021-11-17 VITALS — DIASTOLIC BLOOD PRESSURE: 64 MMHG | SYSTOLIC BLOOD PRESSURE: 120 MMHG

## 2021-11-17 DIAGNOSIS — Z98.890 OTHER SPECIFIED POSTPROCEDURAL STATES: Chronic | ICD-10-CM

## 2021-11-17 DIAGNOSIS — Z98.82 BREAST IMPLANT STATUS: Chronic | ICD-10-CM

## 2021-11-17 DIAGNOSIS — Z30.2 ENCOUNTER FOR STERILIZATION: Chronic | ICD-10-CM

## 2021-11-17 DIAGNOSIS — M24.274 DISORDER OF LIGAMENT, RIGHT FOOT: ICD-10-CM

## 2021-11-17 PROCEDURE — 99214 OFFICE O/P EST MOD 30 MIN: CPT

## 2021-11-17 NOTE — COUNSELING
[FreeTextEntry1] : \par The hospital will contact you to arrange your preoperative testing and preoperative medical evaluation.\par \par Call with any questions or concerns\par \par I will see you on December 7th!

## 2021-11-17 NOTE — HISTORY OF PRESENT ILLNESS
[FreeTextEntry1] : \par The patient is here for surgical counseling for her mixed incontinence\par Mixed incontinence, U>S\par \par s/p Trospium 20 mg, no change\par s/p Oxybutynin ER 20 mg, side effects.\par s/p Myrbetriq 25 mg (nocturia)\par s/p Myrbetriq 50 mg daily: initially helped, but stopped helping\par s/p Vesicare 5mg: no change\par s/p Vesicare 10mg: no change in symptoms\par s/p PTNS x 11 sessions- no change in symptoms\par \par Underwent urodynamics: 3/17/21:\par Impression: decreased capacity (118cc), +D0I, +USUI\par Plan: third line treatments\par \par \par

## 2021-11-17 NOTE — DISCUSSION/SUMMARY
[FreeTextEntry1] : \par Mixed incontinence-\par We discussed pelvic floor muscle rehabilitation, sacral neuromodulation (Interstim device), and intravesical Botox A. The risks and benefits of all were reviewed and the patient voiced understanding and agrees with botox injections.\par \par We discussed the botox injections used for the treatment of overactive bladder. We discussed the risks and the benefits of this treatment modality including the risk of incomplete bladder emptying which would necessitate the patient having to intermittently self catheterize her bladder. We discussed that if the patient were to have incomplete bladder emptying and not catheterize then she would risk damaging her kidneys.\par \par The surgical procedure of exam under anesthesia, cystoscopy,intradetrusor injections of Botulism A, all indicated procedures was reviewed. The patient was advised that the surgery does not improve stress incontinence. The postoperative restrictions were reviewed. All of the patient's questions and concerns were answered.  The risks and benefits and alternatives of the above procedures were reviewed and informed consent was signed. The patient will be scheduled for surgery, preop lab testing and preop medical eval.\par \par

## 2021-11-24 ENCOUNTER — OUTPATIENT (OUTPATIENT)
Dept: OUTPATIENT SERVICES | Facility: HOSPITAL | Age: 61
LOS: 1 days | Discharge: HOME | End: 2021-11-24
Payer: MEDICARE

## 2021-11-24 ENCOUNTER — RESULT REVIEW (OUTPATIENT)
Age: 61
End: 2021-11-24

## 2021-11-24 VITALS
RESPIRATION RATE: 16 BRPM | HEART RATE: 62 BPM | TEMPERATURE: 97 F | WEIGHT: 169.98 LBS | HEIGHT: 65 IN | OXYGEN SATURATION: 98 % | SYSTOLIC BLOOD PRESSURE: 158 MMHG | DIASTOLIC BLOOD PRESSURE: 75 MMHG

## 2021-11-24 DIAGNOSIS — Z98.890 OTHER SPECIFIED POSTPROCEDURAL STATES: Chronic | ICD-10-CM

## 2021-11-24 DIAGNOSIS — Z98.82 BREAST IMPLANT STATUS: Chronic | ICD-10-CM

## 2021-11-24 DIAGNOSIS — Z30.2 ENCOUNTER FOR STERILIZATION: Chronic | ICD-10-CM

## 2021-11-24 DIAGNOSIS — Z01.818 ENCOUNTER FOR OTHER PREPROCEDURAL EXAMINATION: ICD-10-CM

## 2021-11-24 DIAGNOSIS — N39.3 STRESS INCONTINENCE (FEMALE) (MALE): ICD-10-CM

## 2021-11-24 LAB
ALBUMIN SERPL ELPH-MCNC: 4.4 G/DL — SIGNIFICANT CHANGE UP (ref 3.5–5.2)
ALP SERPL-CCNC: 77 U/L — SIGNIFICANT CHANGE UP (ref 30–115)
ALT FLD-CCNC: 29 U/L — SIGNIFICANT CHANGE UP (ref 0–41)
ANION GAP SERPL CALC-SCNC: 10 MMOL/L — SIGNIFICANT CHANGE UP (ref 7–14)
APPEARANCE UR: CLEAR — SIGNIFICANT CHANGE UP
APTT BLD: 40.5 SEC — HIGH (ref 27–39.2)
AST SERPL-CCNC: 46 U/L — HIGH (ref 0–41)
BACTERIA # UR AUTO: NEGATIVE — SIGNIFICANT CHANGE UP
BASOPHILS # BLD AUTO: 0.03 K/UL — SIGNIFICANT CHANGE UP (ref 0–0.2)
BASOPHILS NFR BLD AUTO: 0.5 % — SIGNIFICANT CHANGE UP (ref 0–1)
BILIRUB SERPL-MCNC: 0.5 MG/DL — SIGNIFICANT CHANGE UP (ref 0.2–1.2)
BILIRUB UR-MCNC: NEGATIVE — SIGNIFICANT CHANGE UP
BUN SERPL-MCNC: 12 MG/DL — SIGNIFICANT CHANGE UP (ref 10–20)
CALCIUM SERPL-MCNC: 9.4 MG/DL — SIGNIFICANT CHANGE UP (ref 8.5–10.1)
CHLORIDE SERPL-SCNC: 98 MMOL/L — SIGNIFICANT CHANGE UP (ref 98–110)
CO2 SERPL-SCNC: 28 MMOL/L — SIGNIFICANT CHANGE UP (ref 17–32)
COLOR SPEC: YELLOW — SIGNIFICANT CHANGE UP
CREAT SERPL-MCNC: 0.7 MG/DL — SIGNIFICANT CHANGE UP (ref 0.7–1.5)
DIFF PNL FLD: NEGATIVE — SIGNIFICANT CHANGE UP
EOSINOPHIL # BLD AUTO: 0.04 K/UL — SIGNIFICANT CHANGE UP (ref 0–0.7)
EOSINOPHIL NFR BLD AUTO: 0.7 % — SIGNIFICANT CHANGE UP (ref 0–8)
EPI CELLS # UR: 1 /HPF — SIGNIFICANT CHANGE UP (ref 0–5)
GLUCOSE SERPL-MCNC: 84 MG/DL — SIGNIFICANT CHANGE UP (ref 70–99)
GLUCOSE UR QL: NEGATIVE — SIGNIFICANT CHANGE UP
HCT VFR BLD CALC: 36.2 % — LOW (ref 37–47)
HGB BLD-MCNC: 12 G/DL — SIGNIFICANT CHANGE UP (ref 12–16)
HYALINE CASTS # UR AUTO: 1 /LPF — SIGNIFICANT CHANGE UP (ref 0–7)
IMM GRANULOCYTES NFR BLD AUTO: 0.2 % — SIGNIFICANT CHANGE UP (ref 0.1–0.3)
INR BLD: 1.07 RATIO — SIGNIFICANT CHANGE UP (ref 0.65–1.3)
KETONES UR-MCNC: NEGATIVE — SIGNIFICANT CHANGE UP
LEUKOCYTE ESTERASE UR-ACNC: ABNORMAL
LYMPHOCYTES # BLD AUTO: 1.58 K/UL — SIGNIFICANT CHANGE UP (ref 1.2–3.4)
LYMPHOCYTES # BLD AUTO: 26.6 % — SIGNIFICANT CHANGE UP (ref 20.5–51.1)
MCHC RBC-ENTMCNC: 30.7 PG — SIGNIFICANT CHANGE UP (ref 27–31)
MCHC RBC-ENTMCNC: 33.1 G/DL — SIGNIFICANT CHANGE UP (ref 32–37)
MCV RBC AUTO: 92.6 FL — SIGNIFICANT CHANGE UP (ref 81–99)
MONOCYTES # BLD AUTO: 0.49 K/UL — SIGNIFICANT CHANGE UP (ref 0.1–0.6)
MONOCYTES NFR BLD AUTO: 8.3 % — SIGNIFICANT CHANGE UP (ref 1.7–9.3)
NEUTROPHILS # BLD AUTO: 3.78 K/UL — SIGNIFICANT CHANGE UP (ref 1.4–6.5)
NEUTROPHILS NFR BLD AUTO: 63.7 % — SIGNIFICANT CHANGE UP (ref 42.2–75.2)
NITRITE UR-MCNC: NEGATIVE — SIGNIFICANT CHANGE UP
NRBC # BLD: 0 /100 WBCS — SIGNIFICANT CHANGE UP (ref 0–0)
PH UR: 6.5 — SIGNIFICANT CHANGE UP (ref 5–8)
PLATELET # BLD AUTO: 260 K/UL — SIGNIFICANT CHANGE UP (ref 130–400)
POTASSIUM SERPL-MCNC: 4.3 MMOL/L — SIGNIFICANT CHANGE UP (ref 3.5–5)
POTASSIUM SERPL-SCNC: 4.3 MMOL/L — SIGNIFICANT CHANGE UP (ref 3.5–5)
PROT SERPL-MCNC: 7.3 G/DL — SIGNIFICANT CHANGE UP (ref 6–8)
PROT UR-MCNC: SIGNIFICANT CHANGE UP
PROTHROM AB SERPL-ACNC: 12.3 SEC — SIGNIFICANT CHANGE UP (ref 9.95–12.87)
RBC # BLD: 3.91 M/UL — LOW (ref 4.2–5.4)
RBC # FLD: 12 % — SIGNIFICANT CHANGE UP (ref 11.5–14.5)
RBC CASTS # UR COMP ASSIST: 1 /HPF — SIGNIFICANT CHANGE UP (ref 0–4)
SODIUM SERPL-SCNC: 136 MMOL/L — SIGNIFICANT CHANGE UP (ref 135–146)
SP GR SPEC: 1.04 — HIGH (ref 1.01–1.03)
UROBILINOGEN FLD QL: SIGNIFICANT CHANGE UP
WBC # BLD: 5.93 K/UL — SIGNIFICANT CHANGE UP (ref 4.8–10.8)
WBC # FLD AUTO: 5.93 K/UL — SIGNIFICANT CHANGE UP (ref 4.8–10.8)
WBC UR QL: 1 /HPF — SIGNIFICANT CHANGE UP (ref 0–5)

## 2021-11-24 PROCEDURE — 93010 ELECTROCARDIOGRAM REPORT: CPT

## 2021-11-24 PROCEDURE — 71046 X-RAY EXAM CHEST 2 VIEWS: CPT | Mod: 26

## 2021-11-24 RX ORDER — FOLIC ACID 0.8 MG
1 TABLET ORAL
Qty: 0 | Refills: 0 | DISCHARGE

## 2021-11-24 RX ORDER — SERTRALINE 25 MG/1
1 TABLET, FILM COATED ORAL
Qty: 0 | Refills: 0 | DISCHARGE

## 2021-11-24 NOTE — H&P PST ADULT - REASON FOR ADMISSION
61 yo female presents for PAST in preparation for exam under anesthesia cystoscopy intradetrusor injections of botulism A, all indicated procedures on 12/7/2021 under general anesthesia by Dr. Mitchell ([facility]).

## 2021-11-24 NOTE — H&P PST ADULT - NSICDXPASTMEDICALHX_GEN_ALL_CORE_FT
PAST MEDICAL HISTORY:  Depressed     Gastroesophageal reflux disease with esophagitis     Hypercholesteremia     Hypertension     Hypothyroid     Obesity (BMI 35.0-39.9 without comorbidity)     Overactive bladder      PAST MEDICAL HISTORY:  Depressed     Gastroesophageal reflux disease with esophagitis     Hypercholesteremia     Hypertension     Hypothyroid     Overactive bladder

## 2021-11-24 NOTE — H&P PST ADULT - HISTORY OF PRESENT ILLNESS
Pt states a year ago she was diagnosed with an overactive bladder. Since then she would need to urinate every 30 minutes. Pt tried multiple interventions but denies anything helped. Now for listed procedure.      Denies any chest pain, difficulty breathing, SOB, palpitations, dysuria, URI, or any other infections in the last 2 weeks. Denies any recent travel, contact, or exposure to any persons with known or suspected COVID-19. Pt also denies COVID testing within the last 2 weeks. Pt admits to receiving all doses of Moderna COVID vaccine. Denies any suicidal or homicidal ideations. Pt advised to self quarantine until day of procedure. Exercise tolerance of 1 flight of stairs without dyspnea. AMADEO reviewed with patient. Pt verbalized understanding of all pre-operative instructions.    Anesthesia Alert  YES--Difficult Airway: Class IV  NO--History of neck surgery or radiation  NO--Limited ROM of neck  NO--History of Malignant hyperthermia  NO--No personal or family history of Pseudocholinesterase deficiency.  NO--Prior Anesthesia Complication  NO--Latex Allergy  NO--Loose teeth  NO--History of Rheumatoid Arthritis  YES--AMADEO  NO--Bleeding Risk  NO--Other_____

## 2021-11-25 LAB
CULTURE RESULTS: NO GROWTH — SIGNIFICANT CHANGE UP
SPECIMEN SOURCE: SIGNIFICANT CHANGE UP

## 2021-12-06 NOTE — ASU PATIENT PROFILE, ADULT - FALL HARM RISK - UNIVERSAL INTERVENTIONS
Bed in lowest position, wheels locked, appropriate side rails in place/Call bell, personal items and telephone in reach/Instruct patient to call for assistance before getting out of bed or chair/Non-slip footwear when patient is out of bed/[place] to call system/Physically safe environment - no spills, clutter or unnecessary equipment/Purposeful Proactive Rounding/Room/bathroom lighting operational, light cord in reach

## 2021-12-06 NOTE — ASU PATIENT PROFILE, ADULT - NSICDXPASTMEDICALHX_GEN_ALL_CORE_FT
PAST MEDICAL HISTORY:  Depressed     Gastroesophageal reflux disease with esophagitis     Hypercholesteremia     Hypertension     Hypothyroid     Overactive bladder

## 2021-12-07 ENCOUNTER — OUTPATIENT (OUTPATIENT)
Dept: OUTPATIENT SERVICES | Facility: HOSPITAL | Age: 61
LOS: 1 days | Discharge: HOME | End: 2021-12-07
Payer: MEDICARE

## 2021-12-07 VITALS
RESPIRATION RATE: 18 BRPM | SYSTOLIC BLOOD PRESSURE: 125 MMHG | HEART RATE: 62 BPM | DIASTOLIC BLOOD PRESSURE: 66 MMHG | OXYGEN SATURATION: 98 %

## 2021-12-07 VITALS
HEIGHT: 65 IN | RESPIRATION RATE: 18 BRPM | TEMPERATURE: 98 F | DIASTOLIC BLOOD PRESSURE: 76 MMHG | OXYGEN SATURATION: 96 % | SYSTOLIC BLOOD PRESSURE: 161 MMHG | HEART RATE: 64 BPM | WEIGHT: 169.98 LBS

## 2021-12-07 DIAGNOSIS — Z98.890 OTHER SPECIFIED POSTPROCEDURAL STATES: Chronic | ICD-10-CM

## 2021-12-07 DIAGNOSIS — Z98.82 BREAST IMPLANT STATUS: Chronic | ICD-10-CM

## 2021-12-07 DIAGNOSIS — Z30.2 ENCOUNTER FOR STERILIZATION: Chronic | ICD-10-CM

## 2021-12-07 PROCEDURE — 52287 CYSTOSCOPY CHEMODENERVATION: CPT

## 2021-12-07 RX ORDER — HYDROMORPHONE HYDROCHLORIDE 2 MG/ML
1 INJECTION INTRAMUSCULAR; INTRAVENOUS; SUBCUTANEOUS
Refills: 0 | Status: DISCONTINUED | OUTPATIENT
Start: 2021-12-07 | End: 2021-12-07

## 2021-12-07 RX ORDER — HYDROMORPHONE HYDROCHLORIDE 2 MG/ML
0.5 INJECTION INTRAMUSCULAR; INTRAVENOUS; SUBCUTANEOUS
Refills: 0 | Status: DISCONTINUED | OUTPATIENT
Start: 2021-12-07 | End: 2021-12-07

## 2021-12-07 RX ORDER — OXYCODONE AND ACETAMINOPHEN 5; 325 MG/1; MG/1
1 TABLET ORAL ONCE
Refills: 0 | Status: DISCONTINUED | OUTPATIENT
Start: 2021-12-07 | End: 2021-12-07

## 2021-12-07 RX ORDER — SODIUM CHLORIDE 9 MG/ML
1000 INJECTION, SOLUTION INTRAVENOUS
Refills: 0 | Status: DISCONTINUED | OUTPATIENT
Start: 2021-12-07 | End: 2021-12-07

## 2021-12-07 RX ORDER — AZTREONAM 2 G
1 VIAL (EA) INJECTION
Qty: 14 | Refills: 0
Start: 2021-12-07 | End: 2021-12-13

## 2021-12-07 RX ORDER — MEPERIDINE HYDROCHLORIDE 50 MG/ML
12.5 INJECTION INTRAMUSCULAR; INTRAVENOUS; SUBCUTANEOUS
Refills: 0 | Status: DISCONTINUED | OUTPATIENT
Start: 2021-12-07 | End: 2021-12-07

## 2021-12-07 RX ORDER — ONDANSETRON 8 MG/1
4 TABLET, FILM COATED ORAL ONCE
Refills: 0 | Status: DISCONTINUED | OUTPATIENT
Start: 2021-12-07 | End: 2021-12-07

## 2021-12-07 RX ADMIN — HYDROMORPHONE HYDROCHLORIDE 1 MILLIGRAM(S): 2 INJECTION INTRAMUSCULAR; INTRAVENOUS; SUBCUTANEOUS at 15:55

## 2021-12-07 RX ADMIN — HYDROMORPHONE HYDROCHLORIDE 0.5 MILLIGRAM(S): 2 INJECTION INTRAMUSCULAR; INTRAVENOUS; SUBCUTANEOUS at 15:35

## 2021-12-07 NOTE — CHART NOTE - NSCHARTNOTEFT_GEN_A_CORE
PGY 2 GYN TOV Chart Note    Pt eval at bedside for trial of void.  Pain well controlled prior to starting TOV.  Patient has been out of bed ambulating.    Bladder fully drained through transurethral cesar catheter.  Retrograde filled with 180cc sterile water then clamped.  Cesar removed.  Patient assisted to restroom.  Given necessary time to void (20 minutes).    Patient voided _____ of clear urine, passing TOV. PGY 2 GYN TOV Chart Note    Pt eval at bedside for trial of void.  Pain well controlled prior to starting TOV.  Patient has been out of bed ambulating.    Bladder fully drained through transurethral cesar catheter.  Retrograde filled with 220 0cc sterile water then clamped.  Cesar removed.  Patient assisted to restroom.  Given necessary time to void (20 minutes).    Patient voided 220 of clear urine, passing TOV.    Dr. Moraes and Dr. Mitchell aware

## 2021-12-07 NOTE — ASU DISCHARGE PLAN (ADULT/PEDIATRIC) - CARE PROVIDER_API CALL
Leslie Mitchell)  Female [facility]; Obstetrics and Gynecology  [address] [place] [address]  Phone: [phone]  Fax: [phone]  Established Patient  Follow Up Time: 2 weeks

## 2021-12-07 NOTE — CHART NOTE - NSCHARTNOTEFT_GEN_A_CORE
PACU ANESTHESIA ADMISSION NOTE      Procedure: cystoscopy, intradetrusor injections of botulism A  Post op diagnosis:  mixed incontinence    ____  Intubated  TV:______       Rate: ______      FiO2: ______    _x___  Patent Airway    _x___  Full return of protective reflexes    _x___  Full recovery from anesthesia / back to baseline status    Vitals:  T(F): 97.1   HR: 86  BP: 136/73  RR: 21   SpO2: 93%   Mental Status:  _x___ Awake   _x____ Alert   _____ Drowsy   _____ Sedated    Nausea/Vomiting:  _x___  NO       ______Yes,   See Post - Op Orders         Pain Scale (0-10):  __0___    Treatment: _x___ None    ____ See Post - Op/PCA Orders    Post - Operative Fluids:   __x__ Oral   ____ See Post - Op Orders    Plan: Discharge:   _x___Home       _____Floor     _____Critical Care    _____  Other:_________________    Comments:  No anesthesia issues or complications noted.  Discharge when criteria met.

## 2021-12-07 NOTE — ASU DISCHARGE PLAN (ADULT/PEDIATRIC) - NS MD DC FALL RISK RISK
For information on Fall & Injury Prevention, visit: [email or web address] OR  [email or web address] OR  https://www.cdc.gov/steadi/patient.html

## 2021-12-08 ENCOUNTER — NON-APPOINTMENT (OUTPATIENT)
Age: 61
End: 2021-12-08

## 2021-12-17 DIAGNOSIS — F32.9 MAJOR DEPRESSIVE DISORDER, SINGLE EPISODE, UNSPECIFIED: ICD-10-CM

## 2021-12-17 DIAGNOSIS — K21.9 GASTRO-ESOPHAGEAL REFLUX DISEASE WITHOUT ESOPHAGITIS: ICD-10-CM

## 2021-12-17 DIAGNOSIS — E03.9 HYPOTHYROIDISM, UNSPECIFIED: ICD-10-CM

## 2021-12-17 DIAGNOSIS — E78.00 PURE HYPERCHOLESTEROLEMIA, UNSPECIFIED: ICD-10-CM

## 2021-12-17 DIAGNOSIS — N39.46 MIXED INCONTINENCE: ICD-10-CM

## 2021-12-17 DIAGNOSIS — I10 ESSENTIAL (PRIMARY) HYPERTENSION: ICD-10-CM

## 2021-12-17 DIAGNOSIS — N32.81 OVERACTIVE BLADDER: ICD-10-CM

## 2021-12-21 ENCOUNTER — OUTPATIENT (OUTPATIENT)
Dept: OUTPATIENT SERVICES | Facility: HOSPITAL | Age: 61
LOS: 1 days | Discharge: HOME | End: 2021-12-21

## 2021-12-21 ENCOUNTER — APPOINTMENT (OUTPATIENT)
Dept: UROGYNECOLOGY | Facility: CLINIC | Age: 61
End: 2021-12-21
Payer: MEDICARE

## 2021-12-21 VITALS — BODY MASS INDEX: 28.46 KG/M2 | DIASTOLIC BLOOD PRESSURE: 70 MMHG | SYSTOLIC BLOOD PRESSURE: 125 MMHG | WEIGHT: 171 LBS

## 2021-12-21 DIAGNOSIS — Z30.2 ENCOUNTER FOR STERILIZATION: Chronic | ICD-10-CM

## 2021-12-21 DIAGNOSIS — Z98.890 OTHER SPECIFIED POSTPROCEDURAL STATES: Chronic | ICD-10-CM

## 2021-12-21 DIAGNOSIS — Z98.82 BREAST IMPLANT STATUS: Chronic | ICD-10-CM

## 2021-12-21 PROCEDURE — 51701 INSERT BLADDER CATHETER: CPT

## 2021-12-21 PROCEDURE — 99024 POSTOP FOLLOW-UP VISIT: CPT

## 2021-12-21 NOTE — DISCUSSION/SUMMARY
[FreeTextEntry1] : \par Mixed Incontinence/\par Reviewed postoperative restrictions. All of the patient's questions and concerns were answered. \par Follow up in 3 months or sooner if needed. \par

## 2021-12-21 NOTE — ADDENDUM
[FreeTextEntry1] : We will contact you if the urine results are abnormal.\par \par Please schedule follow up appointment in 3 months.

## 2021-12-23 LAB — BACTERIA UR CULT: NORMAL

## 2021-12-28 ENCOUNTER — OUTPATIENT (OUTPATIENT)
Dept: OUTPATIENT SERVICES | Facility: HOSPITAL | Age: 61
LOS: 1 days | Discharge: HOME | End: 2021-12-28

## 2021-12-28 ENCOUNTER — RESULT REVIEW (OUTPATIENT)
Age: 61
End: 2021-12-28

## 2021-12-28 ENCOUNTER — OUTPATIENT (OUTPATIENT)
Dept: OUTPATIENT SERVICES | Facility: HOSPITAL | Age: 61
LOS: 1 days | Discharge: HOME | End: 2021-12-28
Payer: MEDICARE

## 2021-12-28 ENCOUNTER — APPOINTMENT (OUTPATIENT)
Dept: PODIATRY | Facility: CLINIC | Age: 61
End: 2021-12-28
Payer: MEDICARE

## 2021-12-28 DIAGNOSIS — Z98.890 OTHER SPECIFIED POSTPROCEDURAL STATES: Chronic | ICD-10-CM

## 2021-12-28 DIAGNOSIS — M79.671 PAIN IN RIGHT FOOT: ICD-10-CM

## 2021-12-28 DIAGNOSIS — Z98.82 BREAST IMPLANT STATUS: Chronic | ICD-10-CM

## 2021-12-28 DIAGNOSIS — Z30.2 ENCOUNTER FOR STERILIZATION: Chronic | ICD-10-CM

## 2021-12-28 PROCEDURE — 99213 OFFICE O/P EST LOW 20 MIN: CPT

## 2021-12-28 PROCEDURE — 73630 X-RAY EXAM OF FOOT: CPT | Mod: 26,RT

## 2021-12-28 NOTE — HISTORY OF PRESENT ILLNESS
[FreeTextEntry1] : Pt. is a 61 year old female who presents to the clinic with a chief complaint of pain in her right foot. Pt.  complains of right foot mass that causes her pain. Has been hurting for around 2 months. Pt. denies any recent trauama.  n/f/v/c/sob. Pt. denies any other pedal complaints at this time.

## 2021-12-28 NOTE — REASON FOR VISIT
[Initial Evaluation] : an initial evaluation [Initial Visit] : an initial visit for [FreeTextEntry2] : R foot pain

## 2021-12-28 NOTE — PHYSICAL EXAM
[General Appearance - Alert] : alert [General Appearance - In No Acute Distress] : in no acute distress [Full Pulse] : the pedal pulses are present [Edema] : there was no peripheral edema [Skin Turgor] : normal skin turgor [Skin Lesions] : no skin lesions [Cranial Nerves] : cranial nerves 2-12 were intact [Deep Tendon Reflexes (DTR)] : deep tendon reflexes were 2+ and symmetric [Oriented To Time, Place, And Person] : oriented to person, place, and time [Impaired Insight] : insight and judgment were intact [Affect] : the affect was normal [de-identified] : Pain on the rightr styloid of the 5th metatarsal. No pain against resistance in all compartments R foot. no pain along the peroneal tendons R foot

## 2021-12-28 NOTE — ASSESSMENT
[Verbal] : verbal [Patient] : patient [Good - alert, interested, motivated] : Good - alert, interested, motivated [FreeTextEntry1] : Right foot  pain on the 5th metatarsal base. \par Recommended pt to wear CAM boot to right foot and follow up in 2 weeks, with xrays of her foot. \par \par Rx: Diclofenac. \par Rx: Tylenol. \par Rx: Radiograph right  foot. \par Rx: CAM boot. \par RTO 2 weeks

## 2022-01-11 ENCOUNTER — APPOINTMENT (OUTPATIENT)
Dept: PODIATRY | Facility: CLINIC | Age: 62
End: 2022-01-11

## 2022-01-21 ENCOUNTER — APPOINTMENT (OUTPATIENT)
Dept: GASTROENTEROLOGY | Facility: CLINIC | Age: 62
End: 2022-01-21

## 2022-01-28 ENCOUNTER — NON-APPOINTMENT (OUTPATIENT)
Age: 62
End: 2022-01-28

## 2022-01-28 ENCOUNTER — APPOINTMENT (OUTPATIENT)
Dept: GASTROENTEROLOGY | Facility: CLINIC | Age: 62
End: 2022-01-28
Payer: MEDICARE

## 2022-01-28 ENCOUNTER — OUTPATIENT (OUTPATIENT)
Dept: OUTPATIENT SERVICES | Facility: HOSPITAL | Age: 62
LOS: 1 days | Discharge: HOME | End: 2022-01-28

## 2022-01-28 DIAGNOSIS — Z98.890 OTHER SPECIFIED POSTPROCEDURAL STATES: Chronic | ICD-10-CM

## 2022-01-28 DIAGNOSIS — Z98.82 BREAST IMPLANT STATUS: Chronic | ICD-10-CM

## 2022-01-28 DIAGNOSIS — Z30.2 ENCOUNTER FOR STERILIZATION: Chronic | ICD-10-CM

## 2022-01-28 PROCEDURE — 99214 OFFICE O/P EST MOD 30 MIN: CPT | Mod: GC

## 2022-02-01 ENCOUNTER — INPATIENT (INPATIENT)
Facility: HOSPITAL | Age: 62
LOS: 0 days | Discharge: HOME | End: 2022-02-02
Attending: STUDENT IN AN ORGANIZED HEALTH CARE EDUCATION/TRAINING PROGRAM | Admitting: STUDENT IN AN ORGANIZED HEALTH CARE EDUCATION/TRAINING PROGRAM
Payer: MEDICARE

## 2022-02-01 VITALS
SYSTOLIC BLOOD PRESSURE: 176 MMHG | DIASTOLIC BLOOD PRESSURE: 73 MMHG | HEART RATE: 71 BPM | WEIGHT: 166.89 LBS | RESPIRATION RATE: 16 BRPM | TEMPERATURE: 98 F | OXYGEN SATURATION: 98 % | HEIGHT: 65 IN

## 2022-02-01 DIAGNOSIS — Z98.890 OTHER SPECIFIED POSTPROCEDURAL STATES: Chronic | ICD-10-CM

## 2022-02-01 DIAGNOSIS — Z98.82 BREAST IMPLANT STATUS: Chronic | ICD-10-CM

## 2022-02-01 DIAGNOSIS — Z30.2 ENCOUNTER FOR STERILIZATION: Chronic | ICD-10-CM

## 2022-02-01 LAB
ALBUMIN SERPL ELPH-MCNC: 4.3 G/DL — SIGNIFICANT CHANGE UP (ref 3.5–5.2)
ALP SERPL-CCNC: 73 U/L — SIGNIFICANT CHANGE UP (ref 30–115)
ALT FLD-CCNC: 23 U/L — SIGNIFICANT CHANGE UP (ref 0–41)
ANION GAP SERPL CALC-SCNC: 12 MMOL/L — SIGNIFICANT CHANGE UP (ref 7–14)
ANION GAP SERPL CALC-SCNC: 15 MMOL/L — HIGH (ref 7–14)
APPEARANCE UR: CLEAR — SIGNIFICANT CHANGE UP
APTT BLD: 39.5 SEC — HIGH (ref 27–39.2)
AST SERPL-CCNC: 33 U/L — SIGNIFICANT CHANGE UP (ref 0–41)
BASOPHILS # BLD AUTO: 0.01 K/UL — SIGNIFICANT CHANGE UP (ref 0–0.2)
BASOPHILS # BLD AUTO: 0.02 K/UL — SIGNIFICANT CHANGE UP (ref 0–0.2)
BASOPHILS NFR BLD AUTO: 0.2 % — SIGNIFICANT CHANGE UP (ref 0–1)
BASOPHILS NFR BLD AUTO: 0.3 % — SIGNIFICANT CHANGE UP (ref 0–1)
BILIRUB SERPL-MCNC: 0.4 MG/DL — SIGNIFICANT CHANGE UP (ref 0.2–1.2)
BILIRUB UR-MCNC: NEGATIVE — SIGNIFICANT CHANGE UP
BLD GP AB SCN SERPL QL: SIGNIFICANT CHANGE UP
BUN SERPL-MCNC: 15 MG/DL — SIGNIFICANT CHANGE UP (ref 10–20)
BUN SERPL-MCNC: 9 MG/DL — LOW (ref 10–20)
CALCIUM SERPL-MCNC: 9.1 MG/DL — SIGNIFICANT CHANGE UP (ref 8.5–10.1)
CALCIUM SERPL-MCNC: 9.1 MG/DL — SIGNIFICANT CHANGE UP (ref 8.5–10.1)
CHLORIDE SERPL-SCNC: 102 MMOL/L — SIGNIFICANT CHANGE UP (ref 98–110)
CHLORIDE SERPL-SCNC: 102 MMOL/L — SIGNIFICANT CHANGE UP (ref 98–110)
CO2 SERPL-SCNC: 22 MMOL/L — SIGNIFICANT CHANGE UP (ref 17–32)
CO2 SERPL-SCNC: 26 MMOL/L — SIGNIFICANT CHANGE UP (ref 17–32)
COLOR SPEC: SIGNIFICANT CHANGE UP
CREAT SERPL-MCNC: 0.6 MG/DL — LOW (ref 0.7–1.5)
CREAT SERPL-MCNC: 0.6 MG/DL — LOW (ref 0.7–1.5)
DIFF PNL FLD: NEGATIVE — SIGNIFICANT CHANGE UP
EOSINOPHIL # BLD AUTO: 0 K/UL — SIGNIFICANT CHANGE UP (ref 0–0.7)
EOSINOPHIL # BLD AUTO: 0.07 K/UL — SIGNIFICANT CHANGE UP (ref 0–0.7)
EOSINOPHIL NFR BLD AUTO: 0 % — SIGNIFICANT CHANGE UP (ref 0–8)
EOSINOPHIL NFR BLD AUTO: 0.9 % — SIGNIFICANT CHANGE UP (ref 0–8)
GLUCOSE SERPL-MCNC: 114 MG/DL — HIGH (ref 70–99)
GLUCOSE SERPL-MCNC: 87 MG/DL — SIGNIFICANT CHANGE UP (ref 70–99)
GLUCOSE UR QL: NEGATIVE — SIGNIFICANT CHANGE UP
HCT VFR BLD CALC: 36.2 % — LOW (ref 37–47)
HCT VFR BLD CALC: 38 % — SIGNIFICANT CHANGE UP (ref 37–47)
HGB BLD-MCNC: 12.1 G/DL — SIGNIFICANT CHANGE UP (ref 12–16)
HGB BLD-MCNC: 12.8 G/DL — SIGNIFICANT CHANGE UP (ref 12–16)
IMM GRANULOCYTES NFR BLD AUTO: 0.1 % — SIGNIFICANT CHANGE UP (ref 0.1–0.3)
IMM GRANULOCYTES NFR BLD AUTO: 0.3 % — SIGNIFICANT CHANGE UP (ref 0.1–0.3)
INR BLD: 1.04 RATIO — SIGNIFICANT CHANGE UP (ref 0.65–1.3)
KETONES UR-MCNC: NEGATIVE — SIGNIFICANT CHANGE UP
LACTATE SERPL-SCNC: 0.7 MMOL/L — SIGNIFICANT CHANGE UP (ref 0.7–2)
LEUKOCYTE ESTERASE UR-ACNC: NEGATIVE — SIGNIFICANT CHANGE UP
LIDOCAIN IGE QN: 18 U/L — SIGNIFICANT CHANGE UP (ref 7–60)
LYMPHOCYTES # BLD AUTO: 0.4 K/UL — LOW (ref 1.2–3.4)
LYMPHOCYTES # BLD AUTO: 2.68 K/UL — SIGNIFICANT CHANGE UP (ref 1.2–3.4)
LYMPHOCYTES # BLD AUTO: 36 % — SIGNIFICANT CHANGE UP (ref 20.5–51.1)
LYMPHOCYTES # BLD AUTO: 6.6 % — LOW (ref 20.5–51.1)
MAGNESIUM SERPL-MCNC: 2.1 MG/DL — SIGNIFICANT CHANGE UP (ref 1.8–2.4)
MCHC RBC-ENTMCNC: 30.4 PG — SIGNIFICANT CHANGE UP (ref 27–31)
MCHC RBC-ENTMCNC: 30.9 PG — SIGNIFICANT CHANGE UP (ref 27–31)
MCHC RBC-ENTMCNC: 33.4 G/DL — SIGNIFICANT CHANGE UP (ref 32–37)
MCHC RBC-ENTMCNC: 33.7 G/DL — SIGNIFICANT CHANGE UP (ref 32–37)
MCV RBC AUTO: 90.3 FL — SIGNIFICANT CHANGE UP (ref 81–99)
MCV RBC AUTO: 92.6 FL — SIGNIFICANT CHANGE UP (ref 81–99)
MONOCYTES # BLD AUTO: 0.12 K/UL — SIGNIFICANT CHANGE UP (ref 0.1–0.6)
MONOCYTES # BLD AUTO: 0.61 K/UL — HIGH (ref 0.1–0.6)
MONOCYTES NFR BLD AUTO: 2 % — SIGNIFICANT CHANGE UP (ref 1.7–9.3)
MONOCYTES NFR BLD AUTO: 8.2 % — SIGNIFICANT CHANGE UP (ref 1.7–9.3)
NEUTROPHILS # BLD AUTO: 4.06 K/UL — SIGNIFICANT CHANGE UP (ref 1.4–6.5)
NEUTROPHILS # BLD AUTO: 5.55 K/UL — SIGNIFICANT CHANGE UP (ref 1.4–6.5)
NEUTROPHILS NFR BLD AUTO: 54.5 % — SIGNIFICANT CHANGE UP (ref 42.2–75.2)
NEUTROPHILS NFR BLD AUTO: 90.9 % — HIGH (ref 42.2–75.2)
NITRITE UR-MCNC: NEGATIVE — SIGNIFICANT CHANGE UP
NRBC # BLD: 0 /100 WBCS — SIGNIFICANT CHANGE UP (ref 0–0)
NRBC # BLD: 0 /100 WBCS — SIGNIFICANT CHANGE UP (ref 0–0)
PH UR: 7 — SIGNIFICANT CHANGE UP (ref 5–8)
PHOSPHATE SERPL-MCNC: 4.2 MG/DL — SIGNIFICANT CHANGE UP (ref 2.1–4.9)
PLATELET # BLD AUTO: 219 K/UL — SIGNIFICANT CHANGE UP (ref 130–400)
PLATELET # BLD AUTO: 239 K/UL — SIGNIFICANT CHANGE UP (ref 130–400)
POTASSIUM SERPL-MCNC: 4.3 MMOL/L — SIGNIFICANT CHANGE UP (ref 3.5–5)
POTASSIUM SERPL-MCNC: 5.1 MMOL/L — HIGH (ref 3.5–5)
POTASSIUM SERPL-SCNC: 4.3 MMOL/L — SIGNIFICANT CHANGE UP (ref 3.5–5)
POTASSIUM SERPL-SCNC: 5.1 MMOL/L — HIGH (ref 3.5–5)
PROT SERPL-MCNC: 6.9 G/DL — SIGNIFICANT CHANGE UP (ref 6–8)
PROT UR-MCNC: SIGNIFICANT CHANGE UP
PROTHROM AB SERPL-ACNC: 12 SEC — SIGNIFICANT CHANGE UP (ref 9.95–12.87)
RBC # BLD: 3.91 M/UL — LOW (ref 4.2–5.4)
RBC # BLD: 4.21 M/UL — SIGNIFICANT CHANGE UP (ref 4.2–5.4)
RBC # FLD: 11.9 % — SIGNIFICANT CHANGE UP (ref 11.5–14.5)
RBC # FLD: 12.1 % — SIGNIFICANT CHANGE UP (ref 11.5–14.5)
SARS-COV-2 RNA SPEC QL NAA+PROBE: SIGNIFICANT CHANGE UP
SARS-COV-2 RNA SPEC QL NAA+PROBE: SIGNIFICANT CHANGE UP
SODIUM SERPL-SCNC: 139 MMOL/L — SIGNIFICANT CHANGE UP (ref 135–146)
SODIUM SERPL-SCNC: 140 MMOL/L — SIGNIFICANT CHANGE UP (ref 135–146)
SP GR SPEC: >1.05 (ref 1.01–1.03)
UROBILINOGEN FLD QL: SIGNIFICANT CHANGE UP
WBC # BLD: 6.1 K/UL — SIGNIFICANT CHANGE UP (ref 4.8–10.8)
WBC # BLD: 7.45 K/UL — SIGNIFICANT CHANGE UP (ref 4.8–10.8)
WBC # FLD AUTO: 6.1 K/UL — SIGNIFICANT CHANGE UP (ref 4.8–10.8)
WBC # FLD AUTO: 7.45 K/UL — SIGNIFICANT CHANGE UP (ref 4.8–10.8)

## 2022-02-01 PROCEDURE — 74177 CT ABD & PELVIS W/CONTRAST: CPT | Mod: 26,MA

## 2022-02-01 PROCEDURE — 99284 EMERGENCY DEPT VISIT MOD MDM: CPT

## 2022-02-01 PROCEDURE — 93010 ELECTROCARDIOGRAM REPORT: CPT

## 2022-02-01 PROCEDURE — 99285 EMERGENCY DEPT VISIT HI MDM: CPT

## 2022-02-01 PROCEDURE — 44238 UNLISTED LAPS PX INTESTINE: CPT

## 2022-02-01 RX ORDER — OXYCODONE HYDROCHLORIDE 5 MG/1
5 TABLET ORAL EVERY 4 HOURS
Refills: 0 | Status: DISCONTINUED | OUTPATIENT
Start: 2022-02-01 | End: 2022-02-02

## 2022-02-01 RX ORDER — SODIUM CHLORIDE 9 MG/ML
1000 INJECTION, SOLUTION INTRAVENOUS
Refills: 0 | Status: DISCONTINUED | OUTPATIENT
Start: 2022-02-01 | End: 2022-02-01

## 2022-02-01 RX ORDER — HYDROMORPHONE HYDROCHLORIDE 2 MG/ML
1 INJECTION INTRAMUSCULAR; INTRAVENOUS; SUBCUTANEOUS
Refills: 0 | Status: DISCONTINUED | OUTPATIENT
Start: 2022-02-01 | End: 2022-02-01

## 2022-02-01 RX ORDER — PANTOPRAZOLE SODIUM 20 MG/1
40 TABLET, DELAYED RELEASE ORAL
Refills: 0 | Status: DISCONTINUED | OUTPATIENT
Start: 2022-02-01 | End: 2022-02-01

## 2022-02-01 RX ORDER — HYDROMORPHONE HYDROCHLORIDE 2 MG/ML
0.5 INJECTION INTRAMUSCULAR; INTRAVENOUS; SUBCUTANEOUS
Refills: 0 | Status: DISCONTINUED | OUTPATIENT
Start: 2022-02-01 | End: 2022-02-01

## 2022-02-01 RX ORDER — LEVOTHYROXINE SODIUM 125 MCG
50 TABLET ORAL DAILY
Refills: 0 | Status: DISCONTINUED | OUTPATIENT
Start: 2022-02-01 | End: 2022-02-01

## 2022-02-01 RX ORDER — LEVOTHYROXINE SODIUM 125 MCG
50 TABLET ORAL DAILY
Refills: 0 | Status: DISCONTINUED | OUTPATIENT
Start: 2022-02-01 | End: 2022-02-02

## 2022-02-01 RX ORDER — ACETAMINOPHEN 500 MG
650 TABLET ORAL EVERY 6 HOURS
Refills: 0 | Status: DISCONTINUED | OUTPATIENT
Start: 2022-02-01 | End: 2022-02-02

## 2022-02-01 RX ORDER — ENOXAPARIN SODIUM 100 MG/ML
40 INJECTION SUBCUTANEOUS DAILY
Refills: 0 | Status: DISCONTINUED | OUTPATIENT
Start: 2022-02-01 | End: 2022-02-02

## 2022-02-01 RX ORDER — ACETAMINOPHEN 500 MG
650 TABLET ORAL EVERY 6 HOURS
Refills: 0 | Status: DISCONTINUED | OUTPATIENT
Start: 2022-02-01 | End: 2022-02-01

## 2022-02-01 RX ORDER — SERTRALINE 25 MG/1
100 TABLET, FILM COATED ORAL DAILY
Refills: 0 | Status: DISCONTINUED | OUTPATIENT
Start: 2022-02-01 | End: 2022-02-01

## 2022-02-01 RX ORDER — SERTRALINE 25 MG/1
100 TABLET, FILM COATED ORAL DAILY
Refills: 0 | Status: DISCONTINUED | OUTPATIENT
Start: 2022-02-01 | End: 2022-02-02

## 2022-02-01 RX ORDER — MORPHINE SULFATE 50 MG/1
4 CAPSULE, EXTENDED RELEASE ORAL ONCE
Refills: 0 | Status: DISCONTINUED | OUTPATIENT
Start: 2022-02-01 | End: 2022-02-01

## 2022-02-01 RX ORDER — LOSARTAN POTASSIUM 100 MG/1
100 TABLET, FILM COATED ORAL DAILY
Refills: 0 | Status: DISCONTINUED | OUTPATIENT
Start: 2022-02-01 | End: 2022-02-02

## 2022-02-01 RX ORDER — ONDANSETRON 8 MG/1
4 TABLET, FILM COATED ORAL ONCE
Refills: 0 | Status: DISCONTINUED | OUTPATIENT
Start: 2022-02-01 | End: 2022-02-01

## 2022-02-01 RX ORDER — HYDROCHLOROTHIAZIDE 25 MG
25 TABLET ORAL DAILY
Refills: 0 | Status: DISCONTINUED | OUTPATIENT
Start: 2022-02-01 | End: 2022-02-01

## 2022-02-01 RX ORDER — BUPIVACAINE 13.3 MG/ML
20 INJECTION, SUSPENSION, LIPOSOMAL INFILTRATION ONCE
Refills: 0 | Status: DISCONTINUED | OUTPATIENT
Start: 2022-02-01 | End: 2022-02-01

## 2022-02-01 RX ORDER — FAMOTIDINE 10 MG/ML
20 INJECTION INTRAVENOUS ONCE
Refills: 0 | Status: COMPLETED | OUTPATIENT
Start: 2022-02-01 | End: 2022-02-01

## 2022-02-01 RX ORDER — MEPERIDINE HYDROCHLORIDE 50 MG/ML
12.5 INJECTION INTRAMUSCULAR; INTRAVENOUS; SUBCUTANEOUS
Refills: 0 | Status: DISCONTINUED | OUTPATIENT
Start: 2022-02-01 | End: 2022-02-01

## 2022-02-01 RX ORDER — ACETAMINOPHEN 500 MG
1000 TABLET ORAL ONCE
Refills: 0 | Status: DISCONTINUED | OUTPATIENT
Start: 2022-02-01 | End: 2022-02-01

## 2022-02-01 RX ORDER — FOLIC ACID 0.8 MG
1 TABLET ORAL AT BEDTIME
Refills: 0 | Status: DISCONTINUED | OUTPATIENT
Start: 2022-02-01 | End: 2022-02-01

## 2022-02-01 RX ORDER — HYDROCHLOROTHIAZIDE 25 MG
25 TABLET ORAL DAILY
Refills: 0 | Status: DISCONTINUED | OUTPATIENT
Start: 2022-02-01 | End: 2022-02-02

## 2022-02-01 RX ORDER — IOHEXOL 300 MG/ML
30 INJECTION, SOLUTION INTRAVENOUS ONCE
Refills: 0 | Status: COMPLETED | OUTPATIENT
Start: 2022-02-01 | End: 2022-02-01

## 2022-02-01 RX ORDER — LOSARTAN POTASSIUM 100 MG/1
100 TABLET, FILM COATED ORAL DAILY
Refills: 0 | Status: DISCONTINUED | OUTPATIENT
Start: 2022-02-01 | End: 2022-02-01

## 2022-02-01 RX ADMIN — FAMOTIDINE 104 MILLIGRAM(S): 10 INJECTION INTRAVENOUS at 01:47

## 2022-02-01 RX ADMIN — IOHEXOL 30 MILLILITER(S): 300 INJECTION, SOLUTION INTRAVENOUS at 01:34

## 2022-02-01 RX ADMIN — OXYCODONE HYDROCHLORIDE 5 MILLIGRAM(S): 5 TABLET ORAL at 19:57

## 2022-02-01 RX ADMIN — SODIUM CHLORIDE 75 MILLILITER(S): 9 INJECTION, SOLUTION INTRAVENOUS at 14:21

## 2022-02-01 RX ADMIN — MORPHINE SULFATE 4 MILLIGRAM(S): 50 CAPSULE, EXTENDED RELEASE ORAL at 09:00

## 2022-02-01 RX ADMIN — Medication 650 MILLIGRAM(S): at 18:02

## 2022-02-01 RX ADMIN — OXYCODONE HYDROCHLORIDE 5 MILLIGRAM(S): 5 TABLET ORAL at 20:25

## 2022-02-01 NOTE — H&P ADULT - ASSESSMENT
ASSESSMENT:  61yF w/ PMHx of HTN, hypothyroid, GERD, RnY 2018 presents with abd pain and CT scan concerning for internal hernia    PLAN:  - Type and screen  - NPO  - IVF  - COVID  - plan for urgent OR for diagnostic lap

## 2022-02-01 NOTE — ED PROVIDER NOTE - PHYSICAL EXAMINATION
CONST: Well appearing in NAD  EYES: PERRL, EOMI, Sclera and conjunctiva clear.   ENT: No nasal discharge. Oropharynx normal appearing  NECK: Non-tender, no meningeal signs. normal ROM. supple   CARD: Normal S1 S2; Normal rate and rhythm  RESP: Equal BS B/L, No wheezes, rhonchi or rales. No distress  GI: Soft, Epigastric tenderness, non-distended. no cva tenderness. normal BS  MS: Normal ROM in all extremities. No midline spinal tenderness. pulses 2 +. no calf tenderness or swelling  SKIN: Warm, dry, no acute rashes. Good turgor  NEURO: A&Ox3, No focal deficits.

## 2022-02-01 NOTE — ED ADULT NURSE REASSESSMENT NOTE - NS ED NURSE REASSESS COMMENT FT1
Pt assessed resting comfortably.  Urine collected and sent to lab. Pain medication administered.  No acute distress.

## 2022-02-01 NOTE — ED PROVIDER NOTE - ATTENDING CONTRIBUTION TO CARE
pt co epig pain for weeks. on and off. assoc with a lot of gas. no v, d, fever or chills.  PSH gastric bypass "years ago".    pt in nad, anict, ctab, rrr, ab sft, nt, nd.     labs, imaging, supportive care

## 2022-02-01 NOTE — ED PROVIDER NOTE - OBJECTIVE STATEMENT
61-year-old female with history of hypertension and gastric bypass surgery with Dr. Olvera presents to ED with epigastric abdominal pain for 2 days pain radiates to right upper and left upper quadrants of abdomen.  Patient is constant, and worse with meals.  Patient denies nausea, vomiting, diarrhea, fever, or chills. no urinary symptoms

## 2022-02-01 NOTE — H&P ADULT - HISTORY OF PRESENT ILLNESS
HPI: 62 y/o F w/ PMH of hypothyroidism, HTN, depression, breast augmentation, right inguinal hernia repair, GERD presents to the ED w/ vauge abdominal pain for 2 days. Patient reports shes had pain like this before, on/off. However this time it persisted for 2 days and got worse. Pain is mostly epigastric and in LLQ. No fever, nausea, vomiting, chills, rigors. No changes in BM. No EGD prior. last C-scope was over 2 years ago showing polpys that were removed.       PAST MEDICAL & SURGICAL HISTORY:  Hypertension    Hypothyroid    Depressed    Hypercholesteremia    Gastroesophageal reflux disease with esophagitis    Overactive bladder    Admission for tubal ligation    H/O breast augmentation    H/O plastic surgery  abdominal plasty    H/O right inguinal hernia repair  2014    H/O arthroscopy of left knee  20 yrs ago HPI: 60 y/o F w/ PMH of hypothyroidism, HTN, depression, breast augmentation, right inguinal hernia repair, GERD, RnY 2018 presents to the ED w/ vauge abdominal pain for 2 days. Patient reports shes had pain like this before, on/off. However this time it persisted for 2 days and got worse. Pain is mostly epigastric and in LLQ. No fever, nausea, vomiting, chills, rigors. No changes in BM. No EGD prior. last C-scope was over 2 years ago showing polpys that were removed.       PAST MEDICAL & SURGICAL HISTORY:  Hypertension    Hypothyroid    Depressed    Hypercholesteremia    Gastroesophageal reflux disease with esophagitis    Overactive bladder    Admission for tubal ligation    H/O breast augmentation    H/O plastic surgery  abdominal plasty    H/O right inguinal hernia repair  2014    H/O arthroscopy of left knee  20 yrs ago

## 2022-02-01 NOTE — CONSULT NOTE ADULT - ATTENDING COMMENTS
62yo female with PMHx/PSHx of class III obesity (BMI 40, now 27) s/p paulie-en-y gastric bypass 8/2018 presenting with acute on chronic abdominal pain. Denies fever/chills, nausea/vomiting, chest pain or shortness of breath. +bowel function. No dysuria. On exam, mild LLQ tenderness without peritonitis. Labs reviewed - WBC 9, lactate 0.7, K 5.1, covid pending. CT A/P 2/1 demonstrates new dilation of the distal anastomotic site and swirling of the mesentery and mesenteric vasculature, confluent mesenteric edema along with partially rotated segment, may reflect internal hernia but nonspecific, no definite evidence of small bowel obstruction on the current exam, focal wall thickening involving the gastric antrum may reflect evidence of gastritis, mild intra and extrahepatic biliary dilatation with CBD dilation up to 8 mm and pancreatic duct dilation up to 6 mm to the level of the sphincter of Oddi, consider MRCP/MRI, questionable fat stranding around the bladder, correlation with UA.     Plan - admit, NPO, IV fluids, banana bag, pain control PRN. Plan for operative exploration. Risks, benefits, and alternatives were explained the patient, including bleeding, infection, hernia, and injury to neighboring structures. All questions were answered. Consent was obtained for DIAGNOSTIC LAPAROSCOPY, POSSIBLE OPEN, POSSIBLE BOWEL RESECTION, AND ALL INDICATED PROCEDURES. Booked as URGENT and discussed status of patient with OR  staff.  .

## 2022-02-01 NOTE — PATIENT PROFILE ADULT - FALL HARM RISK - HARM RISK INTERVENTIONS
Assistance with ambulation/Assistance OOB with selected safe patient handling equipment/Communicate Risk of Fall with Harm to all staff/Discuss with provider need for PT consult/Monitor gait and stability/Provide patient with walking aids - walker, cane, crutches/Reinforce activity limits and safety measures with patient and family/Sit up slowly, dangle for a short time, stand at bedside before walking/Tailored Fall Risk Interventions/Use of alarms - bed, chair and/or voice tab/Visual Cue: Yellow wristband and red socks/Bed in lowest position, wheels locked, appropriate side rails in place/Call bell, personal items and telephone in reach/Instruct patient to call for assistance before getting out of bed or chair/Non-slip footwear when patient is out of bed/[place] to call system/Physically safe environment - no spills, clutter or unnecessary equipment/Purposeful Proactive Rounding/Room/bathroom lighting operational, light cord in reach

## 2022-02-01 NOTE — BRIEF OPERATIVE NOTE - NSICDXBRIEFPROCEDURE_GEN_ALL_CORE_FT
PROCEDURES:  Diagnostic laparoscopy 01-Feb-2022 14:28:08  Bennett Steen  Reduction, hernia, internal 01-Feb-2022 14:28:21  Bennett Steen  Suture of mesentery 01-Feb-2022 14:31:46 "Mesenteric defect repair" Bennett Steen  Block, transversus abdominis plane, bilateral 01-Feb-2022 14:32:17  Bennett Steen

## 2022-02-01 NOTE — BRIEF OPERATIVE NOTE - NSICDXBRIEFPREOP_GEN_ALL_CORE_FT
PRE-OP DIAGNOSIS:  Bowel obstruction 01-Feb-2022 14:32:47  Bennett Steen  Internal hernia 01-Feb-2022 14:33:04  Bennett Steen

## 2022-02-01 NOTE — BRIEF OPERATIVE NOTE - NSICDXBRIEFPOSTOP_GEN_ALL_CORE_FT
POST-OP DIAGNOSIS:  Bowel obstruction 01-Feb-2022 14:33:29  Bennett Steen  Internal hernia 01-Feb-2022 14:33:38  Bennett Steen

## 2022-02-01 NOTE — CONSULT NOTE ADULT - SUBJECTIVE AND OBJECTIVE BOX
GENERAL SURGERY CONSULT NOTE    Patient: SLIME COHEN , 61y ([birth year])Female   MRN: [id number]  Location: [facility] ED  Visit: 02-01-22 Emergency  Date: 02-01-22 @ 10:21    HPI: 60 y/o F w/ PMH of hypothyroidism, HTN, depression, breast augmentation, right inguinal hernia repair, GERD presents to the ED w/ vauge abdominal pain for 2 days. Patient reports shes had pain like this before, on/off. However this time it persisted for 2 days and got worse. Pain is mostly epigastric and in LLQ. No fever, nausea, vomiting, chills, rigors. No changes in BM. No EGD prior. last C-scope was over 2 years ago showing polpys that were removed.       PAST MEDICAL & SURGICAL HISTORY:  Hypertension    Hypothyroid    Depressed    Hypercholesteremia    Gastroesophageal reflux disease with esophagitis    Overactive bladder    Admission for tubal ligation    H/O breast augmentation    H/O plastic surgery  abdominal plasty    H/O right inguinal hernia repair  2014    H/O arthroscopy of left knee  20 yrs ago        Home Medications:  folic acid 1 mg oral tablet: 1 tab(s) orally once a day (at bedtime) (07 Dec 2021 13:26)  levothyroxine 50 mcg (0.05 mg) oral tablet: 1 tab(s) orally once a day (07 Dec 2021 13:26)  losartan-hydroCHLOROthiazide 100 mg-25 mg oral tablet: 1 tab(s) orally once a day (07 Dec 2021 13:26)  Percocet 10/325 oral tablet: 1 tab(s) orally every 6 hours, As Needed (07 Dec 2021 13:26)  sertraline 100 mg oral tablet: 1 tab(s) orally once a day (in the afternoon) (07 Dec 2021 13:26)        VITALS:  T(F): 98.4 (02-01-22 @ 00:22), Max: 98.4 (02-01-22 @ 00:22)  HR: 71 (02-01-22 @ 00:22) (71 - 71)  BP: 176/73 (02-01-22 @ 00:22) (176/73 - 176/73)  RR: 16 (02-01-22 @ 00:22) (16 - 16)  SpO2: 98% (02-01-22 @ 00:22) (98% - 98%)    PHYSICAL EXAM:  General: NAD, AAOx3, calm and cooperative  HEENT: NCAT, ANTHONY, EOMI, Trachea ML, Neck supple  Cardiac: RRR S1, S2, no Murmurs, rubs or gallops  Respiratory: CTAB, normal respiratory effort, breath sounds equal BL  Abdomen: Soft, non-distended, mild tenderness, no rebound, no guarding. +BS.  Musculoskeletal: Strength equal BL UE/LE, ROM intact  Skin: Warm/dry, normal color, no jaundice    MEDICATIONS  (STANDING):    MEDICATIONS  (PRN):      LAB/STUDIES:                        12.1   7.45  )-----------( 219      ( 01 Feb 2022 01:50 )             36.2     02-01    139  |  102  |  15  ----------------------------<  87  5.1<H>   |  22  |  0.6<L>    Ca    9.1      01 Feb 2022 01:50    TPro  6.9  /  Alb  4.3  /  TBili  0.4  /  DBili  x   /  AST  33  /  ALT  23  /  AlkPhos  73  02-01    PT/INR - ( 01 Feb 2022 07:24 )   PT: 12.00 sec;   INR: 1.04 ratio         PTT - ( 01 Feb 2022 07:24 )  PTT:39.5 sec  LIVER FUNCTIONS - ( 01 Feb 2022 01:50 )  Alb: 4.3 g/dL / Pro: 6.9 g/dL / ALK PHOS: 73 U/L / ALT: 23 U/L / AST: 33 U/L / GGT: x           Urinalysis Basic - ( 01 Feb 2022 09:10 )    Color: Light Yellow / Appearance: Clear / SG: >1.050 / pH: x  Gluc: x / Ketone: Negative  / Bili: Negative / Urobili: <2 mg/dL   Blood: x / Protein: Trace / Nitrite: Negative   Leuk Esterase: Negative / RBC: x / WBC x   Sq Epi: x / Non Sq Epi: x / Bacteria: x        IMAGING:  < from: CT Abdomen and Pelvis w/ Oral Cont and w/ IV Cont (02.01.22 @ 04:45) >  IMPRESSION:      Status post distant Franky-en-Y gastric bypass with new dilation of the   distal anastomoticsite and swirling of the mesentery and mesenteric   vasculature. There is confluent mesenteric edema along this partially   rotated segment. Edema may reflect sequela of an internal hernia but is   nonspecific. No definite evidence of small bowel obstruction on the   current exam.     Focal wall thickening involving the gastric antrum may reflect evidence   of gastritis    Mild intra and extrahepatic biliary dilatation with CBD dilation up to 8   mm and pancreatic duct dilation up to 6 mm to the level of the sphincter   of Jonny. No CT evidence of associated inflammation or underlying mass.   Consider MRI/MRCP for further evaluation.    Questionable fat stranding around the bladder. Correlation with   urinalysis is recommended.    < end of copied text >      ACCESS DEVICES:  [ ] Peripheral IV  [ ] Central Venous Line	[ ] R	[ ] L	[ ] IJ	[ ] Fem	[ ] SC	Placed:   [ ] Arterial Line		[ ] R	[ ] L	[ ] Fem	[ ] Rad	[ ] Ax	Placed:   [ ] PICC:					[ ] Mediport  [ ] Urinary Catheter, Date Placed:     ASSESSMENT:  61yF w/ PMHx of HTN, hypothyroid, GERD, RnY 2018 presents with abd pain and CT scan concerning for internal hernia    PLAN:  - Type and screen  - COVID  - plan for urgent OR for diagnostic lap      Above plan discussed with Attending Surgeon Dr. Alvarado  02-01-22 @ 10:21

## 2022-02-01 NOTE — H&P ADULT - NSHPLABSRESULTS_GEN_ALL_CORE
LAB/STUDIES:                        12.1   7.45  )-----------( 219      ( 01 Feb 2022 01:50 )             36.2     02-01    139  |  102  |  15  ----------------------------<  87  5.1<H>   |  22  |  0.6<L>    Ca    9.1      01 Feb 2022 01:50    TPro  6.9  /  Alb  4.3  /  TBili  0.4  /  DBili  x   /  AST  33  /  ALT  23  /  AlkPhos  73  02-01    PT/INR - ( 01 Feb 2022 07:24 )   PT: 12.00 sec;   INR: 1.04 ratio         PTT - ( 01 Feb 2022 07:24 )  PTT:39.5 sec  LIVER FUNCTIONS - ( 01 Feb 2022 01:50 )  Alb: 4.3 g/dL / Pro: 6.9 g/dL / ALK PHOS: 73 U/L / ALT: 23 U/L / AST: 33 U/L / GGT: x           Urinalysis Basic - ( 01 Feb 2022 09:10 )    Color: Light Yellow / Appearance: Clear / SG: >1.050 / pH: x  Gluc: x / Ketone: Negative  / Bili: Negative / Urobili: <2 mg/dL   Blood: x / Protein: Trace / Nitrite: Negative   Leuk Esterase: Negative / RBC: x / WBC x   Sq Epi: x / Non Sq Epi: x / Bacteria: x    IMAGING:  < from: CT Abdomen and Pelvis w/ Oral Cont and w/ IV Cont (02.01.22 @ 04:45) >  IMPRESSION:      Status post distant Franky-en-Y gastric bypass with new dilation of the   distal anastomoticsite and swirling of the mesentery and mesenteric   vasculature. There is confluent mesenteric edema along this partially   rotated segment. Edema may reflect sequela of an internal hernia but is   nonspecific. No definite evidence of small bowel obstruction on the   current exam.     Focal wall thickening involving the gastric antrum may reflect evidence   of gastritis    Mild intra and extrahepatic biliary dilatation with CBD dilation up to 8   mm and pancreatic duct dilation up to 6 mm to the level of the sphincter   of Jonny. No CT evidence of associated inflammation or underlying mass.   Consider MRI/MRCP for further evaluation.    Questionable fat stranding around the bladder. Correlation with   urinalysis is recommended.

## 2022-02-01 NOTE — ED PROVIDER NOTE - PROGRESS NOTE DETAILS
SILVESTRE BOATENG: discussed with surgery, they will come eval pt. Patient evaluated by surgery - surgery taking patient to OR as level 1 recommends admission to surgery Dr. Alvarado.

## 2022-02-01 NOTE — CHART NOTE - NSCHARTNOTEFT_GEN_A_CORE
Post Operative Note  Patient: SLIME COHEN 61y (1960) Female   MRN: [id number]  Location: [address]  Visit: 02-01-22 Inpatient  Date: 02-01-22 @ 20:33    Procedure: 61F s/p diagnostic laparoscopy, Reduction of internal hernia suture of mesentery, and bilateral TAP blocks.    Subjective:   Nausea: no, Vomiting: no, Ambulating: yes, Flatus: yes  Pain Assessment: Patient is complaining of abdominal pain that is appropriate for post-operative course.     Objective:  Vitals: T(F): 98.3 (02-01-22 @ 17:57), Max: 98.4 (02-01-22 @ 00:22)  HR: 63 (02-01-22 @ 17:57)  BP: 153/72 (02-01-22 @ 17:57) (119/76 - 179/70)  RR: 18 (02-01-22 @ 17:57)  SpO2: 97% (02-01-22 @ 17:57)  Vent Settings:     In:   02-01-22 @ 07:01  -  02-01-22 @ 20:33  --------------------------------------------------------  IN: 0 mL      IV Fluids:     Out:   02-01-22 @ 07:01  -  02-01-22 @ 20:33  --------------------------------------------------------  OUT: 800 mL      EBL:     Voided Urine:   02-01-22 @ 07:01  -  02-01-22 @ 20:33  --------------------------------------------------------  OUT: 800 mL    Physical Examination:  General Appearance: NA  HEENT: EOMI, sclera non-icteric.  Heart: RRR  Lungs: CTABL  Abdomen:  Soft, minimally tender, appropriate for post-op course, nondistended. No rigidity, guarding, or rebound tenderness.   MSK/Extremities: Warm & well-perfused. Peripheral pulses intact.  Skin: Warm, dry. No jaundice.   Incisions/Wounds: Dressings in place, clean, dry and intact, no signs of infection/active bleeding/drainage    Medications: [Standing]  acetaminophen     Tablet .. 650 milliGRAM(s) Oral every 6 hours  enoxaparin Injectable 40 milliGRAM(s) SubCutaneous daily  hydrochlorothiazide 25 milliGRAM(s) Oral daily  levothyroxine 50 MICROGram(s) Oral daily  losartan 100 milliGRAM(s) Oral daily  oxyCODONE    IR 5 milliGRAM(s) Oral every 4 hours PRN  sertraline 100 milliGRAM(s) Oral daily    Medications: [PRN]  acetaminophen     Tablet .. 650 milliGRAM(s) Oral every 6 hours  enoxaparin Injectable 40 milliGRAM(s) SubCutaneous daily  hydrochlorothiazide 25 milliGRAM(s) Oral daily  levothyroxine 50 MICROGram(s) Oral daily  losartan 100 milliGRAM(s) Oral daily  oxyCODONE    IR 5 milliGRAM(s) Oral every 4 hours PRN  sertraline 100 milliGRAM(s) Oral daily      DVT PROPHYLAXIS: enoxaparin Injectable 40 milliGRAM(s) SubCutaneous daily    GI PROPHYLAXIS:   ANTICOAGULATION:   ANTIBIOTICS:        Labs:                        12.1   7.45  )-----------( 219      ( 01 Feb 2022 01:50 )             36.2     02-01    139  |  102  |  15  ----------------------------<  87  5.1<H>   |  22  |  0.6<L>    Ca    9.1      01 Feb 2022 01:50    TPro  6.9  /  Alb  4.3  /  TBili  0.4  /  DBili  x   /  AST  33  /  ALT  23  /  AlkPhos  73  02-01    PT/INR - ( 01 Feb 2022 07:24 )   PT: 12.00 sec;   INR: 1.04 ratio         PTT - ( 01 Feb 2022 07:24 )  PTT:39.5 sec    Assessment:  61F s/p Diagnostic laparoscopy, Reduction of internal hernia suture of mesentery, and bilateral TAP blocks.    Plan:  - Monitor vitals  - Monitor post-op labs and replete as necessary  - Monitor for bowel function  - Continue Pain Medications if necessary  - Encourage ambulation as tolerated  - Monitor urine output  - Monitor wound and dressing for changes, redress as needed.    Date/Time: 02-01-22 @ 20:33

## 2022-02-01 NOTE — H&P ADULT - NSHPPHYSICALEXAM_GEN_ALL_CORE
VITALS:  T(F): 98.4 (02-01-22 @ 00:22), Max: 98.4 (02-01-22 @ 00:22)  HR: 71 (02-01-22 @ 00:22) (71 - 71)  BP: 176/73 (02-01-22 @ 00:22) (176/73 - 176/73)  RR: 16 (02-01-22 @ 00:22) (16 - 16)  SpO2: 98% (02-01-22 @ 00:22) (98% - 98%)    PHYSICAL EXAM:  General: NAD, AAOx3, calm and cooperative  HEENT: NCAT, ANTHONY, EOMI, Trachea ML, Neck supple  Cardiac: RRR S1, S2, no Murmurs, rubs or gallops  Respiratory: CTAB, normal respiratory effort, breath sounds equal BL  Abdomen: Soft, non-distended, mild tenderness, no rebound, no guarding. +BS.  Musculoskeletal: Strength equal BL UE/LE, ROM intact  Skin: Warm/dry, normal color, no jaundice

## 2022-02-02 ENCOUNTER — TRANSCRIPTION ENCOUNTER (OUTPATIENT)
Age: 62
End: 2022-02-02

## 2022-02-02 VITALS
OXYGEN SATURATION: 95 % | SYSTOLIC BLOOD PRESSURE: 163 MMHG | TEMPERATURE: 97 F | HEART RATE: 50 BPM | DIASTOLIC BLOOD PRESSURE: 74 MMHG | RESPIRATION RATE: 18 BRPM

## 2022-02-02 LAB
CULTURE RESULTS: SIGNIFICANT CHANGE UP
SPECIMEN SOURCE: SIGNIFICANT CHANGE UP

## 2022-02-02 PROCEDURE — 99024 POSTOP FOLLOW-UP VISIT: CPT

## 2022-02-02 RX ORDER — ACETAMINOPHEN 500 MG
2 TABLET ORAL
Qty: 0 | Refills: 0 | DISCHARGE
Start: 2022-02-02

## 2022-02-02 RX ORDER — PANTOPRAZOLE SODIUM 20 MG/1
40 TABLET, DELAYED RELEASE ORAL
Refills: 0 | Status: DISCONTINUED | OUTPATIENT
Start: 2022-02-02 | End: 2022-02-02

## 2022-02-02 RX ADMIN — OXYCODONE HYDROCHLORIDE 5 MILLIGRAM(S): 5 TABLET ORAL at 05:20

## 2022-02-02 RX ADMIN — Medication 650 MILLIGRAM(S): at 11:38

## 2022-02-02 RX ADMIN — OXYCODONE HYDROCHLORIDE 5 MILLIGRAM(S): 5 TABLET ORAL at 17:34

## 2022-02-02 RX ADMIN — Medication 650 MILLIGRAM(S): at 17:34

## 2022-02-02 RX ADMIN — OXYCODONE HYDROCHLORIDE 5 MILLIGRAM(S): 5 TABLET ORAL at 18:04

## 2022-02-02 RX ADMIN — OXYCODONE HYDROCHLORIDE 5 MILLIGRAM(S): 5 TABLET ORAL at 09:18

## 2022-02-02 RX ADMIN — OXYCODONE HYDROCHLORIDE 5 MILLIGRAM(S): 5 TABLET ORAL at 09:48

## 2022-02-02 RX ADMIN — ENOXAPARIN SODIUM 40 MILLIGRAM(S): 100 INJECTION SUBCUTANEOUS at 11:37

## 2022-02-02 RX ADMIN — Medication 650 MILLIGRAM(S): at 05:13

## 2022-02-02 RX ADMIN — Medication 25 MILLIGRAM(S): at 05:12

## 2022-02-02 RX ADMIN — OXYCODONE HYDROCHLORIDE 5 MILLIGRAM(S): 5 TABLET ORAL at 05:50

## 2022-02-02 RX ADMIN — Medication 50 MICROGRAM(S): at 05:12

## 2022-02-02 RX ADMIN — Medication 650 MILLIGRAM(S): at 00:16

## 2022-02-02 RX ADMIN — PANTOPRAZOLE SODIUM 40 MILLIGRAM(S): 20 TABLET, DELAYED RELEASE ORAL at 08:19

## 2022-02-02 RX ADMIN — SERTRALINE 100 MILLIGRAM(S): 25 TABLET, FILM COATED ORAL at 11:37

## 2022-02-02 RX ADMIN — OXYCODONE HYDROCHLORIDE 5 MILLIGRAM(S): 5 TABLET ORAL at 00:45

## 2022-02-02 RX ADMIN — Medication 650 MILLIGRAM(S): at 11:37

## 2022-02-02 RX ADMIN — LOSARTAN POTASSIUM 100 MILLIGRAM(S): 100 TABLET, FILM COATED ORAL at 05:13

## 2022-02-02 RX ADMIN — OXYCODONE HYDROCHLORIDE 5 MILLIGRAM(S): 5 TABLET ORAL at 00:16

## 2022-02-02 NOTE — PROGRESS NOTE ADULT - ATTENDING COMMENTS
60yo female with PMHx/PSHx of class III obesity, history of gastric bypass (Dr. Olvera 2018) admitted for internal hernia s/p laparoscopic reduction of internal hernia and repair of mesenteric defect POD#1. Doing well. Pain minimal, denies nausea/vomiting, fever/chills, chest pain or shortness of breath. Tolerating CLD. Advance to sonali regular diet. Encourage ambulation/OOB, incentive spirometer, DVT ppx. Discharge home with outpatient follow up.

## 2022-02-02 NOTE — DISCHARGE NOTE PROVIDER - CARE PROVIDER_API CALL
Carmen Alvarado)  [facility]  [address] [facility], [facility], [address]rd [place] [address]  Phone: [phone]  Fax: [phone]  Follow Up Time:     Jose Martin Morin)  [address] [place] [address], [address]  [place] [address]  Phone: [phone]  Fax: [phone]  Follow Up Time:

## 2022-02-02 NOTE — DISCHARGE NOTE PROVIDER - CARE PROVIDERS DIRECT ADDRESSES
[email or web address]

## 2022-02-02 NOTE — DISCHARGE NOTE PROVIDER - NSDCMRMEDTOKEN_GEN_ALL_CORE_FT
acetaminophen 325 mg oral tablet: 2 tab(s) orally every 6 hours  folic acid 1 mg oral tablet: 1 tab(s) orally once a day (at bedtime)  levothyroxine 50 mcg (0.05 mg) oral tablet: 1 tab(s) orally once a day  losartan-hydroCHLOROthiazide 100 mg-25 mg oral tablet: 1 tab(s) orally once a day  Percocet 10/325 oral tablet: 1 tab(s) orally every 6 hours, As Needed  sertraline 100 mg oral tablet: 1 tab(s) orally once a day (in the afternoon)

## 2022-02-02 NOTE — PROGRESS NOTE ADULT - SUBJECTIVE AND OBJECTIVE BOX
GENERAL SURGERY PROGRESS NOTE    Patient: SLIME COHEN , 61y ([birth year])Female   MRN: [id number]  Location: [address]  Visit: 02-01-22 Inpatient  Date: 02-02-22 @ 10:10    Hospital Day #: 2  Post-Op Day #: 1    Procedure/Dx/Injuries: diagnostic laparoscopy, reduction of internal hernia, suture of mesentery with abdominal tap block     Events of past 24 hours: Patient is doing well post op with no pain and has been voiding. Patient has diet advanced.     PAST MEDICAL & SURGICAL HISTORY:  Hypertension    Hypothyroid    Depressed    Hypercholesteremia    Gastroesophageal reflux disease with esophagitis    Overactive bladder    Admission for tubal ligation    H/O breast augmentation    H/O plastic surgery  abdominal plasty    H/O right inguinal hernia repair  2014    H/O arthroscopy of left knee  20 yrs ago        Vitals:   T(F): 98 (02-02-22 @ 08:31), Max: 98.3 (02-01-22 @ 17:57)  HR: 51 (02-02-22 @ 08:31)  BP: 154/75 (02-02-22 @ 08:31)  RR: 18 (02-02-22 @ 08:31)  SpO2: 100% (02-02-22 @ 08:31)          Fluids:     I & O's:    02-01-22 @ 07:01  -  02-02-22 @ 07:00  --------------------------------------------------------  IN:  Total IN: 0 mL    OUT:    Voided (mL): 1850 mL  Total OUT: 1850 mL    Total NET: -1850 mL    PHYSICAL EXAM:  General: NAD, AAOx3, calm and cooperative  HEENT: NCAT, Trachea ML, Neck supple  Respiratory: normal respiratory effort, on room air   Abdomen: Soft, non-distended, non-tender, no rebound, no guarding. Incision sites C/D/I   Musculoskeletal: Strength 5/5 BL UE/LE, ROM intact, compartments soft  Skin: Warm/dry, normal color, no jaundice    MEDICATIONS  (STANDING):  acetaminophen     Tablet .. 650 milliGRAM(s) Oral every 6 hours  enoxaparin Injectable 40 milliGRAM(s) SubCutaneous daily  hydrochlorothiazide 25 milliGRAM(s) Oral daily  levothyroxine 50 MICROGram(s) Oral daily  losartan 100 milliGRAM(s) Oral daily  pantoprazole    Tablet 40 milliGRAM(s) Oral before breakfast  sertraline 100 milliGRAM(s) Oral daily    MEDICATIONS  (PRN):  oxyCODONE    IR 5 milliGRAM(s) Oral every 4 hours PRN Severe Pain (7 - 10)      DVT PROPHYLAXIS: enoxaparin Injectable 40 milliGRAM(s) SubCutaneous daily    GI PROPHYLAXIS: pantoprazole    Tablet 40 milliGRAM(s) Oral before breakfast    ANTICOAGULATION:   ANTIBIOTICS:            LAB/STUDIES:  Labs:  CAPILLARY BLOOD GLUCOSE                              12.8   6.10  )-----------( 239      ( 01 Feb 2022 20:00 )             38.0       Auto Neutrophil %: 90.9 % (02-01-22 @ 20:00)  Auto Immature Granulocyte %: 0.3 % (02-01-22 @ 20:00)    02-01    140  |  102  |  9<L>  ----------------------------<  114<H>  4.3   |  26  |  0.6<L>      Calcium, Total Serum: 9.1 mg/dL (02-01-22 @ 20:00)      LFTs:             6.9  | 0.4  | 33       ------------------[73      ( 01 Feb 2022 01:50 )  4.3  | x    | 23          Lipase:18     Amylase:x         Lactate, Blood: 0.7 mmol/L (02-01-22 @ 01:50)      Coags:     12.00  ----< 1.04    ( 01 Feb 2022 07:24 )     39.5                Urinalysis Basic - ( 01 Feb 2022 09:10 )    Color: Light Yellow / Appearance: Clear / SG: >1.050 / pH: x  Gluc: x / Ketone: Negative  / Bili: Negative / Urobili: <2 mg/dL   Blood: x / Protein: Trace / Nitrite: Negative   Leuk Esterase: Negative / RBC: x / WBC x   Sq Epi: x / Non Sq Epi: x / Bacteria: x

## 2022-02-02 NOTE — DISCHARGE NOTE PROVIDER - HOSPITAL COURSE
62 y/o F w/ PMH of hypothyroidism, HTN, depression, breast augmentation, right inguinal hernia repair, GERD, Franky-en-Y gastric bypass in 2018 presents to the ED w/ abdominal pain for 2 days. Patient reports shes had pain like this before, on/off. However this time it persisted for 2 days and got worse. CT scan showed evidence of internal hernia and patient was taken to the OR for diagnostic laparoscopy, reduction of the internal hernia and repair of the mesenteric defect. Patient recovered well post-operatively and by POD#2 she was ambulating, tolerating soft diet, passing gas/bowel movements and pain was well controlled.         from: CT Abdomen and Pelvis w/ Oral Cont and w/ IV Cont (02.01.22 @ 04:45) >  IMPRESSION:      Status post distant Franky-en-Y gastric bypass with new dilation of the   distal anastomoticsite and swirling of the mesentery and mesenteric   vasculature. There is confluent mesenteric edema along this partially   rotated segment. Edema may reflect sequela of an internal hernia but is   nonspecific. No definite evidence of small bowel obstruction on the   current exam.    Focal wall thickening involving the gastric antrum may reflect evidence   of gastritis    Mild intra and extrahepatic biliary dilatation with CBD dilation up to 8   mm and pancreatic duct dilation up to 6 mm to the level of the sphincter   of Jonny. No CT evidence of associated inflammation or underlying mass.   Consider MRI/MRCP for further evaluation.    Questionable fat stranding around the bladder. Correlation with   urinalysis is recommended.

## 2022-02-02 NOTE — PROGRESS NOTE ADULT - ASSESSMENT
ASSESSMENT:  61y F w/ PMHx of obesity, paulie-en-y bypass by ranjan 8/2018. Patient was seen to have an internal hernia on Ct scan and was taken to the OR for repair. Patient is doing well post op with minimal pain, OOBTC, tolerating clears.     PLAN:  - Advance to soft diet   - Ambulate  - F/U voiding   - Possible discharge today if doing well.       [place] TEAM SPECTRA: [id number]

## 2022-02-02 NOTE — DISCHARGE NOTE NURSING/CASE MANAGEMENT/SOCIAL WORK - PATIENT PORTAL LINK FT
You can access the FollowMyHealth Patient Portal offered by [facility] by registering at the following website: [email or web address]. By joining [employer]’s FollowMyHealth portal, you will also be able to view your health information using other applications (apps) compatible with our system.

## 2022-02-10 DIAGNOSIS — E03.9 HYPOTHYROIDISM, UNSPECIFIED: ICD-10-CM

## 2022-02-10 DIAGNOSIS — K95.89 OTHER COMPLICATIONS OF OTHER BARIATRIC PROCEDURE: ICD-10-CM

## 2022-02-10 DIAGNOSIS — F32.A DEPRESSION, UNSPECIFIED: ICD-10-CM

## 2022-02-10 DIAGNOSIS — K21.9 GASTRO-ESOPHAGEAL REFLUX DISEASE WITHOUT ESOPHAGITIS: ICD-10-CM

## 2022-02-10 DIAGNOSIS — R10.9 UNSPECIFIED ABDOMINAL PAIN: ICD-10-CM

## 2022-02-10 DIAGNOSIS — E78.00 PURE HYPERCHOLESTEROLEMIA, UNSPECIFIED: ICD-10-CM

## 2022-02-10 DIAGNOSIS — Z98.84 BARIATRIC SURGERY STATUS: ICD-10-CM
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
## 2022-02-10 DIAGNOSIS — K45.0 OTHER SPECIFIED ABDOMINAL HERNIA WITH OBSTRUCTION, WITHOUT GANGRENE: ICD-10-CM

## 2022-02-10 DIAGNOSIS — I10 ESSENTIAL (PRIMARY) HYPERTENSION: ICD-10-CM

## 2022-02-10 DIAGNOSIS — N32.81 OVERACTIVE BLADDER: ICD-10-CM

## 2022-02-15 NOTE — ASSESSMENT
[FreeTextEntry1] : 56 y/o FM w/ HTN, DLD, MD, Hx of Franky en y bypass (2018) prsents for surveillance colonoscopy and dyspepsia like symptoms after consumption of food. Pt states she feels a bloating sensation after consumption of food within 1 hour and pain is crampy is 4/10 without exacerbating or relieving factors. Pain then selfresolves. pt's bariatric surgeon is aware of symptoms and recommended modified diet plan.\par \par #Dyspepsia\par #encounter for surveillance colonoscopy\par - Last CF (2018) - 3 TAs removed. good prep\par \par Rec\par - will schedule for egd and colonoscopy\par - will order routine blood work\par - will order bowel prep

## 2022-02-15 NOTE — HISTORY OF PRESENT ILLNESS
[de-identified] : 58 y/o FM w/ HTN, DLD, MD, Hx of Franky en y bypass (2018) prsents for surveillance colonoscopy and dyspepsia like symptoms after consumption of food. Pt states she feels a bloating sensation after consumption of food within 1 hour and pain is crampy is 4/10 without exacerbating or relieving factors. Pain then selfresolves. pt's bariatric surgeon is aware of symptoms and recommended modified diet plan.

## 2022-02-23 ENCOUNTER — APPOINTMENT (OUTPATIENT)
Dept: SURGERY | Facility: CLINIC | Age: 62
End: 2022-02-23
Payer: MEDICARE

## 2022-02-23 ENCOUNTER — OUTPATIENT (OUTPATIENT)
Dept: OUTPATIENT SERVICES | Facility: HOSPITAL | Age: 62
LOS: 1 days | Discharge: HOME | End: 2022-02-23
Payer: MEDICARE

## 2022-02-23 VITALS
OXYGEN SATURATION: 98 % | HEART RATE: 74 BPM | HEIGHT: 65 IN | TEMPERATURE: 97.3 F | BODY MASS INDEX: 27.86 KG/M2 | WEIGHT: 167.2 LBS | DIASTOLIC BLOOD PRESSURE: 70 MMHG | SYSTOLIC BLOOD PRESSURE: 136 MMHG

## 2022-02-23 DIAGNOSIS — Z30.2 ENCOUNTER FOR STERILIZATION: Chronic | ICD-10-CM

## 2022-02-23 DIAGNOSIS — Z98.890 OTHER SPECIFIED POSTPROCEDURAL STATES: Chronic | ICD-10-CM

## 2022-02-23 DIAGNOSIS — Z98.82 BREAST IMPLANT STATUS: Chronic | ICD-10-CM

## 2022-02-23 DIAGNOSIS — Z12.31 ENCOUNTER FOR SCREENING MAMMOGRAM FOR MALIGNANT NEOPLASM OF BREAST: ICD-10-CM

## 2022-02-23 PROCEDURE — 99212 OFFICE O/P EST SF 10 MIN: CPT

## 2022-02-23 PROCEDURE — 77063 BREAST TOMOSYNTHESIS BI: CPT | Mod: 26

## 2022-02-23 PROCEDURE — 77067 SCR MAMMO BI INCL CAD: CPT | Mod: 26

## 2022-03-04 NOTE — ASSESSMENT
[de-identified] : 60yo female with PMHx/PSHx gastric bypass 2018 with internal hernia s/p laparoscopic reduction and repair of mesenteric defect. Doing well.\par -no heavy lifting x 6 weeks\par -return to office PRN - particularly fevers, worsening abdominal pain, drainage from wound

## 2022-03-04 NOTE — HISTORY OF PRESENT ILLNESS
[Procedure: ___] : Procedure performed: [unfilled]  [Date of Surgery: ___] : Date of Surgery:   [unfilled] [Surgeon Name:   ___] : Surgeon Name: Dr. HOPSON [de-identified] : 62yo female with PMHx class III obesity s/p gastric bypass ([place], 8/2018) recently admitted for abdominal pain, attributed to internal hernia s/p laparoscopic reduction of internal hernia and repair of mesentery. Post-operative course was uneventful. She was discharged the following day. She states she has been well since surgery. Occasional incisional pain, but otherwise denies fever/chills, nausea/vomiting, chest pain or shortness of breath. Tolerating diet. +bowel function.

## 2022-03-04 NOTE — PHYSICAL EXAM
[de-identified] : soft, non-tender, no rebound/guarding, no masses/hernias, wounds without erythema, drainage, or induration

## 2022-03-20 NOTE — ED PROVIDER NOTE - BIRTH SEX
Routing refill request to provider for review/approval because:  Drug not on the [facility] refill protocol   Was a tapered dose started in December    Last Written Prescription Date:    predniSONE (DELTASONE) 10 MG tablet 42 tablet 0 12/3/2021  No   Si tabs po daily x 2 d, 5 tabs po daily x 2 d, 4 tabs po daily x 2 d,  3 tabs po daily x 2 d,  2 tabs po daily x 2 d,  1 tab po daily x 2 d.   Sent to pharmacy as: predniSONE 10 MG Oral Tablet (DELTASONE)   Class: E-Prescribe   Order: [id number]   E-Prescribing Status: Receipt confirmed by pharmacy (12/3/2021 11:15 AM CST)       Last office visit provider:  12/3/21     Requested Prescriptions   Pending Prescriptions Disp Refills     predniSONE (DELTASONE) 10 MG tablet 42 tablet 0     Si tabs po daily x 2 d, 5 tabs po daily x 2 d, 4 tabs po daily x 2 d,  3 tabs po daily x 2 d,  2 tabs po daily x 2 d,  1 tab po daily x 2 d.       There is no refill protocol information for this order          Jaki Carrion RN 22 11:50 AM   Female

## 2022-03-24 ENCOUNTER — NON-APPOINTMENT (OUTPATIENT)
Age: 62
End: 2022-03-24

## 2022-03-25 ENCOUNTER — LABORATORY RESULT (OUTPATIENT)
Age: 62
End: 2022-03-25

## 2022-03-28 ENCOUNTER — OUTPATIENT (OUTPATIENT)
Dept: OUTPATIENT SERVICES | Facility: HOSPITAL | Age: 62
LOS: 1 days | Discharge: HOME | End: 2022-03-28
Payer: MEDICARE

## 2022-03-28 ENCOUNTER — NON-APPOINTMENT (OUTPATIENT)
Age: 62
End: 2022-03-28

## 2022-03-28 ENCOUNTER — TRANSCRIPTION ENCOUNTER (OUTPATIENT)
Age: 62
End: 2022-03-28

## 2022-03-28 ENCOUNTER — RESULT REVIEW (OUTPATIENT)
Age: 62
End: 2022-03-28

## 2022-03-28 VITALS
RESPIRATION RATE: 18 BRPM | TEMPERATURE: 97 F | HEART RATE: 68 BPM | HEIGHT: 65 IN | WEIGHT: 164.91 LBS | DIASTOLIC BLOOD PRESSURE: 59 MMHG | SYSTOLIC BLOOD PRESSURE: 129 MMHG

## 2022-03-28 VITALS
HEART RATE: 63 BPM | DIASTOLIC BLOOD PRESSURE: 70 MMHG | OXYGEN SATURATION: 100 % | SYSTOLIC BLOOD PRESSURE: 153 MMHG | RESPIRATION RATE: 18 BRPM

## 2022-03-28 DIAGNOSIS — Z98.82 BREAST IMPLANT STATUS: Chronic | ICD-10-CM

## 2022-03-28 DIAGNOSIS — Z98.890 OTHER SPECIFIED POSTPROCEDURAL STATES: Chronic | ICD-10-CM

## 2022-03-28 DIAGNOSIS — Z98.84 BARIATRIC SURGERY STATUS: Chronic | ICD-10-CM

## 2022-03-28 DIAGNOSIS — Z30.2 ENCOUNTER FOR STERILIZATION: Chronic | ICD-10-CM

## 2022-03-28 PROCEDURE — 45378 DIAGNOSTIC COLONOSCOPY: CPT | Mod: 53

## 2022-03-28 PROCEDURE — 88305 TISSUE EXAM BY PATHOLOGIST: CPT | Mod: 26

## 2022-03-28 PROCEDURE — 43239 EGD BIOPSY SINGLE/MULTIPLE: CPT

## 2022-03-28 PROCEDURE — 88312 SPECIAL STAINS GROUP 1: CPT | Mod: 26

## 2022-03-28 NOTE — ASU DISCHARGE PLAN (ADULT/PEDIATRIC) - ASU DC SPECIAL INSTRUCTIONSFT
Follow up with our [facility] located at [address] [place] [address]. Phone Number: [phone]

## 2022-03-28 NOTE — ASU DISCHARGE PLAN (ADULT/PEDIATRIC) - NS MD DC FALL RISK RISK
For information on Fall & Injury Prevention, visit: [email or web address] OR  [email or web address] OR  https://www.cdc.gov/steadi/patient.html

## 2022-03-28 NOTE — ASU PATIENT PROFILE, ADULT - FALL HARM RISK - UNIVERSAL INTERVENTIONS
Bed in lowest position, wheels locked, appropriate side rails in place/Call bell, personal items and telephone in reach/Instruct patient to call for assistance before getting out of bed or chair/Non-slip footwear when patient is out of bed/[place] to call system/Physically safe environment - no spills, clutter or unnecessary equipment/Purposeful Proactive Rounding/Room/bathroom lighting operational, light cord in reach

## 2022-03-28 NOTE — CHART NOTE - NSCHARTNOTEFT_GEN_A_CORE
PACU ANESTHESIA ADMISSION NOTE      Procedure:   Post op diagnosis:      ____  Intubated  TV:______       Rate: ______      FiO2: ______    __x__  Patent Airway    __x__  Full return of protective reflexes    __x__  Full recovery from anesthesia / back to baseline         Mental Status:  _x___ Awake   __x___ Alert   _____ Drowsy   _____ Sedated    Nausea/Vomiting:  _x___ NO  ______Yes,   __x__ See Post - Op Orders          Pain Scale (0-10):  __0___    Treatment: ____ None    __x__ See Post - Op/PCA Orders    Post - Operative Fluids:   ____ Oral   __x__ See Post - Op Orders    Plan: Discharge:   _x___Home       _____Floor     _____Critical Care    _____  Other:_________________    Comments: When parameters met.

## 2022-03-28 NOTE — H&P PST ADULT - HISTORY OF PRESENT ILLNESS
60 y/o FM w/ HTN, MD EONC, Hx of Franky en y bypass (2018) presents for surveillance colonoscopy and EGD for dyspepsia like symptoms +  bloating

## 2022-03-28 NOTE — ASU PATIENT PROFILE, ADULT - NSICDXPASTSURGICALHX_GEN_ALL_CORE_FT
PAST SURGICAL HISTORY:  Admission for tubal ligation     H/O arthroscopy of left knee 20 yrs ago    H/O breast augmentation     H/O plastic surgery abdominal plasty    H/O right inguinal hernia repair 2014     PAST SURGICAL HISTORY:  Admission for tubal ligation     H/O arthroscopy of left knee 20 yrs ago    H/O breast augmentation     H/O gastric bypass 2019    H/O plastic surgery abdominal plasty    H/O right inguinal hernia repair 2014

## 2022-03-28 NOTE — ASU DISCHARGE PLAN (ADULT/PEDIATRIC) - WILL THE PATIENT ACCEPT THE PFIZER COVID-19 VACCINE IF ELIGIBLE AND IT IS AVAILABLE?
Not applicable
-seen by neurology recommended MRI head and c-spine  -fall percussion  -meclizine PRN

## 2022-03-30 ENCOUNTER — OUTPATIENT (OUTPATIENT)
Dept: OUTPATIENT SERVICES | Facility: HOSPITAL | Age: 62
LOS: 1 days | Discharge: HOME | End: 2022-03-30

## 2022-03-30 ENCOUNTER — APPOINTMENT (OUTPATIENT)
Dept: UROGYNECOLOGY | Facility: CLINIC | Age: 62
End: 2022-03-30
Payer: MEDICARE

## 2022-03-30 VITALS
HEIGHT: 65 IN | BODY MASS INDEX: 28.99 KG/M2 | DIASTOLIC BLOOD PRESSURE: 80 MMHG | WEIGHT: 174 LBS | SYSTOLIC BLOOD PRESSURE: 155 MMHG

## 2022-03-30 DIAGNOSIS — Z98.890 OTHER SPECIFIED POSTPROCEDURAL STATES: Chronic | ICD-10-CM

## 2022-03-30 DIAGNOSIS — M79.671 PAIN IN RIGHT FOOT: ICD-10-CM

## 2022-03-30 DIAGNOSIS — Z98.82 BREAST IMPLANT STATUS: Chronic | ICD-10-CM

## 2022-03-30 DIAGNOSIS — Z98.84 BARIATRIC SURGERY STATUS: Chronic | ICD-10-CM

## 2022-03-30 DIAGNOSIS — S99.921A UNSPECIFIED INJURY OF RIGHT FOOT, INITIAL ENCOUNTER: ICD-10-CM

## 2022-03-30 DIAGNOSIS — Z30.2 ENCOUNTER FOR STERILIZATION: Chronic | ICD-10-CM

## 2022-03-30 LAB — SURGICAL PATHOLOGY STUDY: SIGNIFICANT CHANGE UP

## 2022-03-30 PROCEDURE — 99214 OFFICE O/P EST MOD 30 MIN: CPT | Mod: 25

## 2022-03-30 PROCEDURE — 51701 INSERT BLADDER CATHETER: CPT

## 2022-03-31 LAB
APPEARANCE: CLEAR
BILIRUBIN URINE: NEGATIVE
BLOOD URINE: NEGATIVE
COLOR: NORMAL
GLUCOSE QUALITATIVE U: NEGATIVE
KETONES URINE: NEGATIVE
LEUKOCYTE ESTERASE URINE: NEGATIVE
NITRITE URINE: NEGATIVE
PH URINE: 7
PROTEIN URINE: NEGATIVE
SPECIFIC GRAVITY URINE: 1
UROBILINOGEN URINE: NORMAL

## 2022-04-01 DIAGNOSIS — Z12.11 ENCOUNTER FOR SCREENING FOR MALIGNANT NEOPLASM OF COLON: ICD-10-CM

## 2022-04-01 DIAGNOSIS — F32.9 MAJOR DEPRESSIVE DISORDER, SINGLE EPISODE, UNSPECIFIED: ICD-10-CM

## 2022-04-01 DIAGNOSIS — E03.9 HYPOTHYROIDISM, UNSPECIFIED: ICD-10-CM

## 2022-04-01 DIAGNOSIS — Z98.84 BARIATRIC SURGERY STATUS: ICD-10-CM

## 2022-04-01 DIAGNOSIS — I10 ESSENTIAL (PRIMARY) HYPERTENSION: ICD-10-CM

## 2022-04-01 DIAGNOSIS — E78.00 PURE HYPERCHOLESTEROLEMIA, UNSPECIFIED: ICD-10-CM

## 2022-04-01 LAB — BACTERIA UR CULT: NORMAL

## 2022-04-01 NOTE — DISCUSSION/SUMMARY
[FreeTextEntry1] : \par Mixed incontinence-\par Discussed trying two medications together and see if gives better improvement. The risks and benefits of vesicare and myrbetriq was reviewed.\par Patient will return in 6 weeks for a med check and PVR check.\par If the incontinence has not improved or if she does not empty well while taking both, will then consider interstim placement.

## 2022-04-01 NOTE — HISTORY OF PRESENT ILLNESS
[FreeTextEntry1] : \par The patient is here for follow up for mixed incontinence\par Last seen on 12/21/2021 for a 2 week postop visit s/p cysto/detusor botox injections. At that visit, her PVR was normal\par \par s/p Trospium 20 mg, no change\par s/p Oxybutynin ER 20 mg, side effects.\par s/p Myrbetriq 25 mg (nocturia)\par s/p Myrbetriq 50 mg daily: initially helped, but stopped helping\par s/p Vesicare 5mg: no change\par s/p Vesicare 10mg: no change in symptoms\par s/p PTNS x 11 sessions- no change in symptoms\par \par Underwent urodynamics: 3/17/21:\par Impression: decreased capacity (118cc), +D0I, +USUI\par Plan: third line treatments\par \par botox did not work at all\par \par developed L inguinal mass after botox surgery then had emergency inguinal surgery and the mass stayed\par had MRI 3/3/2022: possible cellulitis, no hernia seen

## 2022-04-01 NOTE — COUNSELING
[FreeTextEntry1] : \par Please start the vesicare 10mg in the morning and myrbetriq 50mg in the evening.\par \par Please call my office if you have any issues with the cost or side effects of the medication.\par \par Schedule 6 week med check with my PAEvelia. PVR check\par \par Please call your surgeon for follow up for the inguinal mass and the MRI results

## 2022-04-01 NOTE — PHYSICAL EXAM
[Chaperone Present] : A chaperone was present in the examining room during all aspects of the physical examination [FreeTextEntry1] : Urethra was prepped in sterile fashion and then a sterile catheter (14F) was used by me to drain the bladder. The patient tolerated the procedure well.\par Void: 150cc\par PVR: 40cc\par \par with valsalva, 3cm inguinal mass, non tender, firm

## 2022-04-12 ENCOUNTER — TRANSCRIPTION ENCOUNTER (OUTPATIENT)
Age: 62
End: 2022-04-12

## 2022-04-12 ENCOUNTER — OUTPATIENT (OUTPATIENT)
Dept: OUTPATIENT SERVICES | Facility: HOSPITAL | Age: 62
LOS: 1 days | Discharge: HOME | End: 2022-04-12
Payer: MEDICARE

## 2022-04-12 VITALS
HEART RATE: 68 BPM | WEIGHT: 162.04 LBS | SYSTOLIC BLOOD PRESSURE: 129 MMHG | DIASTOLIC BLOOD PRESSURE: 72 MMHG | HEIGHT: 65 IN | RESPIRATION RATE: 18 BRPM | TEMPERATURE: 97 F

## 2022-04-12 VITALS
HEART RATE: 60 BPM | SYSTOLIC BLOOD PRESSURE: 150 MMHG | OXYGEN SATURATION: 100 % | DIASTOLIC BLOOD PRESSURE: 66 MMHG | RESPIRATION RATE: 18 BRPM

## 2022-04-12 DIAGNOSIS — Z98.890 OTHER SPECIFIED POSTPROCEDURAL STATES: Chronic | ICD-10-CM

## 2022-04-12 DIAGNOSIS — Z30.2 ENCOUNTER FOR STERILIZATION: Chronic | ICD-10-CM

## 2022-04-12 DIAGNOSIS — Z98.84 BARIATRIC SURGERY STATUS: Chronic | ICD-10-CM

## 2022-04-12 DIAGNOSIS — Z98.82 BREAST IMPLANT STATUS: Chronic | ICD-10-CM

## 2022-04-12 PROCEDURE — 45378 DIAGNOSTIC COLONOSCOPY: CPT

## 2022-04-12 NOTE — ASU PATIENT PROFILE, ADULT - NSICDXPASTSURGICALHX_GEN_ALL_CORE_FT
PAST SURGICAL HISTORY:  Admission for tubal ligation     H/O arthroscopy of left knee 20 yrs ago    H/O breast augmentation     H/O gastric bypass 2019    H/O plastic surgery abdominal plasty    H/O right inguinal hernia repair 2014

## 2022-04-12 NOTE — ASU PATIENT PROFILE, ADULT - FALL HARM RISK - UNIVERSAL INTERVENTIONS
Bed in lowest position, wheels locked, appropriate side rails in place/Call bell, personal items and telephone in reach/Instruct patient to call for assistance before getting out of bed or chair/Non-slip footwear when patient is out of bed/[place] to call system/Physically safe environment - no spills, clutter or unnecessary equipment/Purposeful Proactive Rounding/Room/bathroom lighting operational, light cord in reach

## 2022-04-12 NOTE — ASU DISCHARGE PLAN (ADULT/PEDIATRIC) - NS MD DC FALL RISK RISK
For information on Fall & Injury Prevention, visit: [email or web address] OR  [email or web address] OR  https://www.cdc.gov/steadi/patient.html

## 2022-04-13 ENCOUNTER — NON-APPOINTMENT (OUTPATIENT)
Age: 62
End: 2022-04-13

## 2022-04-18 ENCOUNTER — NON-APPOINTMENT (OUTPATIENT)
Age: 62
End: 2022-04-18

## 2022-04-18 RX ORDER — MIRABEGRON 50 MG/1
50 TABLET, FILM COATED, EXTENDED RELEASE ORAL
Qty: 30 | Refills: 2 | Status: DISCONTINUED | COMMUNITY
Start: 2022-03-30 | End: 2022-04-18

## 2022-04-19 ENCOUNTER — HOSPITAL ENCOUNTER (INPATIENT)
Dept: HOSPITAL 74 - YASAS | Age: 62
LOS: 5 days | Discharge: HOME | DRG: 897 | End: 2022-04-24
Attending: ALLERGY & IMMUNOLOGY | Admitting: ALLERGY & IMMUNOLOGY
Payer: COMMERCIAL

## 2022-04-19 VITALS — BODY MASS INDEX: 26.2 KG/M2

## 2022-04-19 DIAGNOSIS — E78.00 PURE HYPERCHOLESTEROLEMIA, UNSPECIFIED: ICD-10-CM

## 2022-04-19 DIAGNOSIS — Z09 ENCOUNTER FOR FOLLOW-UP EXAMINATION AFTER COMPLETED TREATMENT FOR CONDITIONS OTHER THAN MALIGNANT NEOPLASM: ICD-10-CM

## 2022-04-19 DIAGNOSIS — I10: ICD-10-CM

## 2022-04-19 DIAGNOSIS — F10.10: ICD-10-CM

## 2022-04-19 DIAGNOSIS — F11.23: Primary | ICD-10-CM

## 2022-04-19 DIAGNOSIS — Z86.010 PERSONAL HISTORY OF COLONIC POLYPS: ICD-10-CM

## 2022-04-19 DIAGNOSIS — I10 ESSENTIAL (PRIMARY) HYPERTENSION: ICD-10-CM

## 2022-04-19 DIAGNOSIS — F41.8: ICD-10-CM

## 2022-04-19 DIAGNOSIS — K64.8 OTHER HEMORRHOIDS: ICD-10-CM

## 2022-04-19 DIAGNOSIS — E03.9 HYPOTHYROIDISM, UNSPECIFIED: ICD-10-CM

## 2022-04-19 DIAGNOSIS — Z86.59: ICD-10-CM

## 2022-04-19 DIAGNOSIS — G47.33 OBSTRUCTIVE SLEEP APNEA (ADULT) (PEDIATRIC): ICD-10-CM

## 2022-04-19 DIAGNOSIS — Z87.891: ICD-10-CM

## 2022-04-19 DIAGNOSIS — Z88.1 ALLERGY STATUS TO OTHER ANTIBIOTIC AGENTS STATUS: ICD-10-CM

## 2022-04-19 DIAGNOSIS — E03.9: ICD-10-CM

## 2022-04-19 DIAGNOSIS — K59.00: ICD-10-CM

## 2022-04-19 DIAGNOSIS — F32.A DEPRESSION, UNSPECIFIED: ICD-10-CM

## 2022-04-19 DIAGNOSIS — F32.A: ICD-10-CM

## 2022-04-19 DIAGNOSIS — F14.20: ICD-10-CM

## 2022-04-19 DIAGNOSIS — Z86.19: ICD-10-CM

## 2022-04-19 PROCEDURE — U0005 INFEC AGEN DETEC AMPLI PROBE: HCPCS

## 2022-04-19 PROCEDURE — U0003 INFECTIOUS AGENT DETECTION BY NUCLEIC ACID (DNA OR RNA); SEVERE ACUTE RESPIRATORY SYNDROME CORONAVIRUS 2 (SARS-COV-2) (CORONAVIRUS DISEASE [COVID-19]), AMPLIFIED PROBE TECHNIQUE, MAKING USE OF HIGH THROUGHPUT TECHNOLOGIES AS DESCRIBED BY CMS-2020-01-R: HCPCS

## 2022-04-19 PROCEDURE — HZ2ZZZZ DETOXIFICATION SERVICES FOR SUBSTANCE ABUSE TREATMENT: ICD-10-PCS | Performed by: ALLERGY & IMMUNOLOGY

## 2022-04-19 RX ADMIN — HYDROXYZINE PAMOATE SCH: 25 CAPSULE ORAL at 23:02

## 2022-04-19 RX ADMIN — Medication SCH MG: at 22:46

## 2022-04-19 RX ADMIN — Medication SCH: at 21:50

## 2022-04-19 RX ADMIN — Medication SCH MG: at 22:45

## 2022-04-19 RX ADMIN — HYDROXYZINE PAMOATE SCH: 25 CAPSULE ORAL at 21:50

## 2022-04-20 ENCOUNTER — NON-APPOINTMENT (OUTPATIENT)
Age: 62
End: 2022-04-20

## 2022-04-20 LAB
ALBUMIN SERPL-MCNC: 2.8 G/DL (ref 3.4–5)
ALP SERPL-CCNC: 62 U/L (ref 45–117)
ALT SERPL-CCNC: 26 U/L (ref 13–61)
ANION GAP SERPL CALC-SCNC: 2 MMOL/L (ref 8–16)
AST SERPL-CCNC: 17 U/L (ref 15–37)
BILIRUB SERPL-MCNC: 0.4 MG/DL (ref 0.2–1)
BUN SERPL-MCNC: 9.1 MG/DL (ref 7–18)
CALCIUM SERPL-MCNC: 9 MG/DL (ref 8.5–10.1)
CHLORIDE SERPL-SCNC: 104 MMOL/L (ref 98–107)
CO2 SERPL-SCNC: 37 MMOL/L (ref 21–32)
CREAT SERPL-MCNC: 0.5 MG/DL (ref 0.55–1.3)
DEPRECATED RDW RBC AUTO: 13 % (ref 11.6–15.6)
GLUCOSE SERPL-MCNC: 86 MG/DL (ref 74–106)
HCT VFR BLD CALC: 31.7 % (ref 32.4–45.2)
HGB BLD-MCNC: 10.8 GM/DL (ref 10.7–15.3)
MCH RBC QN AUTO: 31.2 PG (ref 25.7–33.7)
MCHC RBC AUTO-ENTMCNC: 34 G/DL (ref 32–36)
MCV RBC: 92 FL (ref 80–96)
PLATELET # BLD AUTO: 229 10^3/UL (ref 134–434)
PMV BLD: 7.8 FL (ref 7.5–11.1)
PROT SERPL-MCNC: 5.9 G/DL (ref 6.4–8.2)
RBC # BLD AUTO: 3.44 M/MM3 (ref 3.6–5.2)
SODIUM SERPL-SCNC: 143 MMOL/L (ref 136–145)
WBC # BLD AUTO: 4.9 K/MM3 (ref 4–10)

## 2022-04-20 RX ADMIN — HYDROXYZINE PAMOATE SCH MG: 25 CAPSULE ORAL at 10:05

## 2022-04-20 RX ADMIN — Medication SCH TAB: at 10:04

## 2022-04-20 RX ADMIN — Medication SCH MG: at 22:35

## 2022-04-20 RX ADMIN — ALUMINUM HYDROXIDE, MAGNESIUM HYDROXIDE, AND SIMETHICONE PRN ML: 200; 200; 20 SUSPENSION ORAL at 18:50

## 2022-04-20 RX ADMIN — HYDROXYZINE PAMOATE SCH: 25 CAPSULE ORAL at 07:00

## 2022-04-20 RX ADMIN — HYDROXYZINE PAMOATE SCH: 25 CAPSULE ORAL at 14:34

## 2022-04-20 RX ADMIN — SERTRALINE HYDROCHLORIDE SCH MG: 50 TABLET ORAL at 12:34

## 2022-04-20 RX ADMIN — HYDROXYZINE PAMOATE SCH: 25 CAPSULE ORAL at 19:17

## 2022-04-20 RX ADMIN — HYDROXYZINE PAMOATE SCH: 25 CAPSULE ORAL at 22:38

## 2022-04-20 RX ADMIN — METHOCARBAMOL PRN MG: 500 TABLET ORAL at 18:50

## 2022-04-21 RX ADMIN — ALUMINUM HYDROXIDE, MAGNESIUM HYDROXIDE, AND SIMETHICONE PRN ML: 200; 200; 20 SUSPENSION ORAL at 16:28

## 2022-04-21 RX ADMIN — SIMETHICONE CHEW TAB 80 MG PRN MG: 80 TABLET ORAL at 13:58

## 2022-04-21 RX ADMIN — Medication SCH MG: at 22:04

## 2022-04-21 RX ADMIN — DICYCLOMINE HYDROCHLORIDE PRN MG: 10 CAPSULE ORAL at 10:54

## 2022-04-21 RX ADMIN — DICYCLOMINE HYDROCHLORIDE PRN MG: 10 CAPSULE ORAL at 22:05

## 2022-04-21 RX ADMIN — LOSARTAN POTASSIUM AND HYDROCHLOROTHIAZIDE TABLETS SCH TAB: 50; 12.5 TABLET, FILM COATED ORAL at 13:58

## 2022-04-21 RX ADMIN — HYDROXYZINE PAMOATE SCH MG: 25 CAPSULE ORAL at 10:55

## 2022-04-21 RX ADMIN — SIMETHICONE CHEW TAB 80 MG PRN MG: 80 TABLET ORAL at 17:55

## 2022-04-21 RX ADMIN — Medication SCH TAB: at 10:52

## 2022-04-21 RX ADMIN — SIMETHICONE CHEW TAB 80 MG PRN MG: 80 TABLET ORAL at 22:04

## 2022-04-21 RX ADMIN — HYDROXYZINE PAMOATE SCH: 25 CAPSULE ORAL at 14:01

## 2022-04-21 RX ADMIN — SERTRALINE HYDROCHLORIDE SCH MG: 50 TABLET ORAL at 10:55

## 2022-04-21 RX ADMIN — HYDROXYZINE PAMOATE SCH MG: 25 CAPSULE ORAL at 22:04

## 2022-04-21 RX ADMIN — HYDROXYZINE PAMOATE SCH MG: 25 CAPSULE ORAL at 17:56

## 2022-04-21 RX ADMIN — HYDROXYZINE PAMOATE SCH: 25 CAPSULE ORAL at 07:29

## 2022-04-21 RX ADMIN — DICYCLOMINE HYDROCHLORIDE PRN MG: 10 CAPSULE ORAL at 03:40

## 2022-04-22 RX ADMIN — LOSARTAN POTASSIUM AND HYDROCHLOROTHIAZIDE TABLETS SCH TAB: 50; 12.5 TABLET, FILM COATED ORAL at 10:32

## 2022-04-22 RX ADMIN — SERTRALINE HYDROCHLORIDE SCH MG: 50 TABLET ORAL at 10:31

## 2022-04-22 RX ADMIN — HYDROXYZINE PAMOATE SCH MG: 25 CAPSULE ORAL at 17:51

## 2022-04-22 RX ADMIN — HYDROXYZINE PAMOATE SCH MG: 25 CAPSULE ORAL at 22:10

## 2022-04-22 RX ADMIN — Medication SCH MG: at 22:10

## 2022-04-22 RX ADMIN — HYDROXYZINE PAMOATE SCH MG: 25 CAPSULE ORAL at 07:28

## 2022-04-22 RX ADMIN — HYDROXYZINE PAMOATE SCH MG: 25 CAPSULE ORAL at 10:32

## 2022-04-22 RX ADMIN — Medication SCH TAB: at 10:31

## 2022-04-22 RX ADMIN — LEVOTHYROXINE SODIUM SCH MCG: 25 TABLET ORAL at 07:27

## 2022-04-22 RX ADMIN — HYDROXYZINE PAMOATE SCH: 25 CAPSULE ORAL at 15:21

## 2022-04-23 RX ADMIN — HYDROXYZINE PAMOATE SCH: 25 CAPSULE ORAL at 18:44

## 2022-04-23 RX ADMIN — LEVOTHYROXINE SODIUM SCH MCG: 25 TABLET ORAL at 06:29

## 2022-04-23 RX ADMIN — LOSARTAN POTASSIUM AND HYDROCHLOROTHIAZIDE TABLETS SCH TAB: 50; 12.5 TABLET, FILM COATED ORAL at 10:42

## 2022-04-23 RX ADMIN — HYDROXYZINE PAMOATE SCH MG: 25 CAPSULE ORAL at 10:42

## 2022-04-23 RX ADMIN — METHOCARBAMOL PRN MG: 500 TABLET ORAL at 10:41

## 2022-04-23 RX ADMIN — HYDROXYZINE PAMOATE SCH MG: 25 CAPSULE ORAL at 06:29

## 2022-04-23 RX ADMIN — ALUMINUM HYDROXIDE, MAGNESIUM HYDROXIDE, AND SIMETHICONE PRN ML: 200; 200; 20 SUSPENSION ORAL at 22:11

## 2022-04-23 RX ADMIN — SIMETHICONE CHEW TAB 80 MG PRN MG: 80 TABLET ORAL at 20:45

## 2022-04-23 RX ADMIN — HYDROXYZINE PAMOATE SCH MG: 25 CAPSULE ORAL at 22:10

## 2022-04-23 RX ADMIN — Medication SCH MG: at 22:10

## 2022-04-23 RX ADMIN — SERTRALINE HYDROCHLORIDE SCH MG: 50 TABLET ORAL at 10:40

## 2022-04-23 RX ADMIN — Medication SCH TAB: at 10:41

## 2022-04-23 RX ADMIN — HYDROXYZINE PAMOATE SCH: 25 CAPSULE ORAL at 15:03

## 2022-04-24 VITALS — HEART RATE: 59 BPM

## 2022-04-24 VITALS — TEMPERATURE: 97.5 F | SYSTOLIC BLOOD PRESSURE: 132 MMHG | DIASTOLIC BLOOD PRESSURE: 69 MMHG

## 2022-04-24 RX ADMIN — SIMETHICONE CHEW TAB 80 MG PRN MG: 80 TABLET ORAL at 16:34

## 2022-04-24 RX ADMIN — ALUMINUM HYDROXIDE, MAGNESIUM HYDROXIDE, AND SIMETHICONE PRN ML: 200; 200; 20 SUSPENSION ORAL at 16:34

## 2022-04-24 RX ADMIN — LEVOTHYROXINE SODIUM SCH MCG: 25 TABLET ORAL at 06:06

## 2022-04-24 RX ADMIN — HYDROXYZINE PAMOATE SCH: 25 CAPSULE ORAL at 15:46

## 2022-04-24 RX ADMIN — SERTRALINE HYDROCHLORIDE SCH MG: 50 TABLET ORAL at 10:30

## 2022-04-24 RX ADMIN — HYDROXYZINE PAMOATE SCH MG: 25 CAPSULE ORAL at 10:30

## 2022-04-24 RX ADMIN — Medication SCH TAB: at 10:28

## 2022-04-24 RX ADMIN — LOSARTAN POTASSIUM AND HYDROCHLOROTHIAZIDE TABLETS SCH TAB: 50; 12.5 TABLET, FILM COATED ORAL at 10:30

## 2022-04-24 RX ADMIN — HYDROXYZINE PAMOATE SCH MG: 25 CAPSULE ORAL at 06:06

## 2022-05-07 ENCOUNTER — OUTPATIENT (OUTPATIENT)
Dept: OUTPATIENT SERVICES | Facility: HOSPITAL | Age: 62
LOS: 1 days | Discharge: HOME | End: 2022-05-07
Payer: MEDICARE

## 2022-05-07 DIAGNOSIS — Z98.890 OTHER SPECIFIED POSTPROCEDURAL STATES: Chronic | ICD-10-CM

## 2022-05-07 DIAGNOSIS — K86.89 OTHER SPECIFIED DISEASES OF PANCREAS: ICD-10-CM

## 2022-05-07 DIAGNOSIS — Z98.84 BARIATRIC SURGERY STATUS: Chronic | ICD-10-CM

## 2022-05-07 DIAGNOSIS — Z30.2 ENCOUNTER FOR STERILIZATION: Chronic | ICD-10-CM

## 2022-05-07 DIAGNOSIS — Z98.82 BREAST IMPLANT STATUS: Chronic | ICD-10-CM

## 2022-05-07 PROCEDURE — 74183 MRI ABD W/O CNTR FLWD CNTR: CPT | Mod: 26

## 2022-05-09 NOTE — ED PROVIDER NOTE - DISCHARGE REVIEW MATERIAL PRESENTED
Is This A New Presentation, Or A Follow-Up?: Skin Lesion Additional History: Pt states line under nail has darkened and has become more painful. .

## 2022-05-22 ENCOUNTER — EMERGENCY (EMERGENCY)
Facility: HOSPITAL | Age: 62
LOS: 0 days | Discharge: HOME | End: 2022-05-23
Attending: EMERGENCY MEDICINE | Admitting: EMERGENCY MEDICINE
Payer: MEDICARE

## 2022-05-22 VITALS
TEMPERATURE: 96 F | DIASTOLIC BLOOD PRESSURE: 74 MMHG | SYSTOLIC BLOOD PRESSURE: 152 MMHG | OXYGEN SATURATION: 98 % | HEART RATE: 78 BPM | RESPIRATION RATE: 18 BRPM | WEIGHT: 158.07 LBS | HEIGHT: 65 IN

## 2022-05-22 DIAGNOSIS — Z98.82 BREAST IMPLANT STATUS: Chronic | ICD-10-CM

## 2022-05-22 DIAGNOSIS — Z87.448 PERSONAL HISTORY OF OTHER DISEASES OF URINARY SYSTEM: ICD-10-CM

## 2022-05-22 DIAGNOSIS — Z98.890 OTHER SPECIFIED POSTPROCEDURAL STATES: Chronic | ICD-10-CM

## 2022-05-22 DIAGNOSIS — R10.9 UNSPECIFIED ABDOMINAL PAIN: ICD-10-CM

## 2022-05-22 DIAGNOSIS — F32.A DEPRESSION, UNSPECIFIED: ICD-10-CM

## 2022-05-22 DIAGNOSIS — Z98.84 BARIATRIC SURGERY STATUS: ICD-10-CM

## 2022-05-22 DIAGNOSIS — Z98.84 BARIATRIC SURGERY STATUS: Chronic | ICD-10-CM

## 2022-05-22 DIAGNOSIS — Z30.2 ENCOUNTER FOR STERILIZATION: Chronic | ICD-10-CM

## 2022-05-22 DIAGNOSIS — Z98.890 OTHER SPECIFIED POSTPROCEDURAL STATES: ICD-10-CM

## 2022-05-22 DIAGNOSIS — Z98.82 BREAST IMPLANT STATUS: ICD-10-CM

## 2022-05-22 DIAGNOSIS — E78.00 PURE HYPERCHOLESTEROLEMIA, UNSPECIFIED: ICD-10-CM

## 2022-05-22 DIAGNOSIS — K21.9 GASTRO-ESOPHAGEAL REFLUX DISEASE WITHOUT ESOPHAGITIS: ICD-10-CM

## 2022-05-22 DIAGNOSIS — I10 ESSENTIAL (PRIMARY) HYPERTENSION: ICD-10-CM

## 2022-05-22 DIAGNOSIS — Z88.1 ALLERGY STATUS TO OTHER ANTIBIOTIC AGENTS STATUS: ICD-10-CM

## 2022-05-22 DIAGNOSIS — E03.9 HYPOTHYROIDISM, UNSPECIFIED: ICD-10-CM

## 2022-05-22 DIAGNOSIS — Z87.19 PERSONAL HISTORY OF OTHER DISEASES OF THE DIGESTIVE SYSTEM: ICD-10-CM

## 2022-05-22 DIAGNOSIS — K40.90 UNILATERAL INGUINAL HERNIA, WITHOUT OBSTRUCTION OR GANGRENE, NOT SPECIFIED AS RECURRENT: ICD-10-CM

## 2022-05-22 PROCEDURE — 99284 EMERGENCY DEPT VISIT MOD MDM: CPT

## 2022-05-22 RX ORDER — SODIUM CHLORIDE 9 MG/ML
1000 INJECTION INTRAMUSCULAR; INTRAVENOUS; SUBCUTANEOUS ONCE
Refills: 0 | Status: COMPLETED | OUTPATIENT
Start: 2022-05-22 | End: 2022-05-22

## 2022-05-22 NOTE — ED ADULT TRIAGE NOTE - CHIEF COMPLAINT QUOTE
pt sts having severe pain to upper abd when standing up. denies n/v/d. normal urine. last bm today. denies physical activity.

## 2022-05-22 NOTE — ED ADULT NURSE NOTE - NS ED NURSE LEVEL OF CONSCIOUSNESS ORIENTATION
Patient seen and evaluated by GUILLERMINA. EKG: Normal sinus rhythm with a rate of 68. Normal axis. Normal intervals and durations. No ST or T wave changes appreciated. Inferior/Anterior Q waves.  Inferior q waves appear to be new from previous EKG on 7/18/2020     Espinoza Cisneros, DO  07/23/[address] [place] [address],   07/23/21 9171 Oriented - self; Oriented - place; Oriented - time Adhesive capsulitis of shoulder, unspecified laterality    Diabetes mellitus  Type 1  Hypothyroidism

## 2022-05-23 LAB
ALBUMIN SERPL ELPH-MCNC: 4.8 G/DL — SIGNIFICANT CHANGE UP (ref 3.5–5.2)
ALP SERPL-CCNC: 83 U/L — SIGNIFICANT CHANGE UP (ref 30–115)
ALT FLD-CCNC: 24 U/L — SIGNIFICANT CHANGE UP (ref 0–41)
ANION GAP SERPL CALC-SCNC: 13 MMOL/L — SIGNIFICANT CHANGE UP (ref 7–14)
AST SERPL-CCNC: 25 U/L — SIGNIFICANT CHANGE UP (ref 0–41)
BASOPHILS # BLD AUTO: 0.03 K/UL — SIGNIFICANT CHANGE UP (ref 0–0.2)
BASOPHILS NFR BLD AUTO: 0.4 % — SIGNIFICANT CHANGE UP (ref 0–1)
BILIRUB SERPL-MCNC: 0.4 MG/DL — SIGNIFICANT CHANGE UP (ref 0.2–1.2)
BUN SERPL-MCNC: 11 MG/DL — SIGNIFICANT CHANGE UP (ref 10–20)
CALCIUM SERPL-MCNC: 9.7 MG/DL — SIGNIFICANT CHANGE UP (ref 8.5–10.1)
CHLORIDE SERPL-SCNC: 99 MMOL/L — SIGNIFICANT CHANGE UP (ref 98–110)
CO2 SERPL-SCNC: 27 MMOL/L — SIGNIFICANT CHANGE UP (ref 17–32)
CREAT SERPL-MCNC: 0.6 MG/DL — LOW (ref 0.7–1.5)
EGFR: 102 ML/MIN/1.73M2 — SIGNIFICANT CHANGE UP
EOSINOPHIL # BLD AUTO: 0.05 K/UL — SIGNIFICANT CHANGE UP (ref 0–0.7)
EOSINOPHIL NFR BLD AUTO: 0.7 % — SIGNIFICANT CHANGE UP (ref 0–8)
GLUCOSE SERPL-MCNC: 91 MG/DL — SIGNIFICANT CHANGE UP (ref 70–99)
HCT VFR BLD CALC: 38.1 % — SIGNIFICANT CHANGE UP (ref 37–47)
HGB BLD-MCNC: 13.2 G/DL — SIGNIFICANT CHANGE UP (ref 12–16)
IMM GRANULOCYTES NFR BLD AUTO: 0.1 % — SIGNIFICANT CHANGE UP (ref 0.1–0.3)
LACTATE SERPL-SCNC: 0.7 MMOL/L — SIGNIFICANT CHANGE UP (ref 0.7–2)
LIDOCAIN IGE QN: 34 U/L — SIGNIFICANT CHANGE UP (ref 7–60)
LYMPHOCYTES # BLD AUTO: 2.27 K/UL — SIGNIFICANT CHANGE UP (ref 1.2–3.4)
LYMPHOCYTES # BLD AUTO: 32.3 % — SIGNIFICANT CHANGE UP (ref 20.5–51.1)
MCHC RBC-ENTMCNC: 30.5 PG — SIGNIFICANT CHANGE UP (ref 27–31)
MCHC RBC-ENTMCNC: 34.6 G/DL — SIGNIFICANT CHANGE UP (ref 32–37)
MCV RBC AUTO: 88 FL — SIGNIFICANT CHANGE UP (ref 81–99)
MONOCYTES # BLD AUTO: 0.53 K/UL — SIGNIFICANT CHANGE UP (ref 0.1–0.6)
MONOCYTES NFR BLD AUTO: 7.5 % — SIGNIFICANT CHANGE UP (ref 1.7–9.3)
NEUTROPHILS # BLD AUTO: 4.13 K/UL — SIGNIFICANT CHANGE UP (ref 1.4–6.5)
NEUTROPHILS NFR BLD AUTO: 59 % — SIGNIFICANT CHANGE UP (ref 42.2–75.2)
NRBC # BLD: 0 /100 WBCS — SIGNIFICANT CHANGE UP (ref 0–0)
PLATELET # BLD AUTO: 262 K/UL — SIGNIFICANT CHANGE UP (ref 130–400)
POTASSIUM SERPL-MCNC: 3.8 MMOL/L — SIGNIFICANT CHANGE UP (ref 3.5–5)
POTASSIUM SERPL-SCNC: 3.8 MMOL/L — SIGNIFICANT CHANGE UP (ref 3.5–5)
PROT SERPL-MCNC: 7.8 G/DL — SIGNIFICANT CHANGE UP (ref 6–8)
RBC # BLD: 4.33 M/UL — SIGNIFICANT CHANGE UP (ref 4.2–5.4)
RBC # FLD: 11.9 % — SIGNIFICANT CHANGE UP (ref 11.5–14.5)
SODIUM SERPL-SCNC: 139 MMOL/L — SIGNIFICANT CHANGE UP (ref 135–146)
WBC # BLD: 7.02 K/UL — SIGNIFICANT CHANGE UP (ref 4.8–10.8)
WBC # FLD AUTO: 7.02 K/UL — SIGNIFICANT CHANGE UP (ref 4.8–10.8)

## 2022-05-23 PROCEDURE — 71046 X-RAY EXAM CHEST 2 VIEWS: CPT | Mod: 26

## 2022-05-23 RX ADMIN — SODIUM CHLORIDE 1000 MILLILITER(S): 9 INJECTION INTRAMUSCULAR; INTRAVENOUS; SUBCUTANEOUS at 00:14

## 2022-05-23 NOTE — ED PROVIDER NOTE - NSFOLLOWUPCLINICS_GEN_ALL_ED_FT
[facility]  Surgery  [address] [place] [address]  Phone: [phone]  Fax:   Follow Up Time: Routine

## 2022-05-23 NOTE — ED PROVIDER NOTE - OBJECTIVE STATEMENT
61-year-old female past medical history of hypothyroidism, hypertension, depression, and going over hernia repair on the right presents to ED for intermittent abdominal pain.  Patient states she noticed it when she was standing up.  Since arrival to the emergency department pain has dissipated.  No urinary symptoms no nausea no vomiting no diarrhea no fever no chills.  Patient had an episode on Wednesday but she did not come to the hospital then when she had episode today she figured she should come get checked out.

## 2022-05-23 NOTE — ED PROVIDER NOTE - CLINICAL SUMMARY MEDICAL DECISION MAKING FREE TEXT BOX
60 yo Female presented to ED for abdominal pain.  Patient's main abdominal p.o. abdomen remained soft nontender nondistended.  Labs normal chest x-ray normal DC home.  Discussed strict return precautions of signs of strangulated hernia patient understands.

## 2022-05-23 NOTE — ED PROVIDER NOTE - PHYSICAL EXAMINATION
Const: Well nourished, well developed, appears stated age  Eyes: PERRL, no conjunctival injection  HENT:  Neck supple without meningismus   CV: RRR, Warm, well-perfused extremities  RESP: CTA B/L, no tachypnea   GI: soft, non-tender, non-distended, Reducible L inguinal hernia   MSK: No gross deformities appreciated  Skin: Warm, dry. No rashes  Neuro: Alert, CNs II-XII grossly intact. Sensation and motor function of extremities grossly intact.  Psych: Appropriate mood and affect.

## 2022-06-07 ENCOUNTER — APPOINTMENT (OUTPATIENT)
Dept: SURGERY | Facility: CLINIC | Age: 62
End: 2022-06-07
Payer: MEDICARE

## 2022-06-07 VITALS — BODY MASS INDEX: 26.66 KG/M2 | HEIGHT: 65 IN | WEIGHT: 160 LBS

## 2022-06-07 PROCEDURE — 99214 OFFICE O/P EST MOD 30 MIN: CPT

## 2022-06-07 NOTE — ASSESSMENT
[FreeTextEntry1] : Elvira is a pleasant 61-year-old woman with a past medical history significant for hypertension, hypothyroidism, anxiety and depression along with multiple surgical procedures including bariatric surgery in the past, tubal ligation, abdominoplasty and abdominal wall hernia repair (by another surgeon), breast augmentation, knee surgery and recent internal hernia repair due to adhesions from her previous bariatric surgery now presenting to the office with pain and swelling in the left groin suspicious for another hernia.  She enjoys exercising and doing Pilates.\par \par Physical examination demonstrates a tender lemon sized bulge in the left groin which is reducible with minimal to moderate difficulty consistent with a large symptomatic protruding bowel containing left inguinal hernia warranting surgical repair.  There is no evidence of incarceration or strangulation, and the patient denies any symptoms of obstruction.  Examination of her right groin demonstrates some mild to moderate weakness but no obvious hernia.  Her umbilical examination is unremarkable.  Her current BMI is 27.\par \par I explained the pros and cons of surgery, as well as all risks, benefits, indications and alternatives of the procedure and the patient understood and agreed. Elvira was scheduled for the repair of her left inguinal hernia with mesh on Friday, July 15 2022 under LOCAL with IV SED[facility] with presurgical testing waived.  She was encouraged to avoid heavy lifting and strenuous activity in the interim, of course.

## 2022-06-07 NOTE — CONSULT LETTER
[Dear  ___] : Dear  [unfilled], [Courtesy Letter:] : I had the pleasure of seeing your patient, [unfilled], in my office today. [Please see my note below.] : Please see my note below. [Consult Closing:] : Thank you very much for allowing me to participate in the care of this patient.  If you have any questions, please do not hesitate to contact me. [FreeTextEntry3] : Respectfully,\par \par Joey Bruno M.D., FACS\par

## 2022-06-07 NOTE — PHYSICAL EXAM
[Normal Breath Sounds] : Normal breath sounds [No Rash or Lesion] : No rash or lesion [Alert] : alert [Calm] : calm [JVD] : no jugular venous distention  [de-identified] : Healthy [de-identified] : Normal [de-identified] : Mildly protuberant abdomen [de-identified] : Large reducible left inguinal hernia

## 2022-06-08 ENCOUNTER — APPOINTMENT (OUTPATIENT)
Dept: UROGYNECOLOGY | Facility: CLINIC | Age: 62
End: 2022-06-08
Payer: MEDICARE

## 2022-06-08 ENCOUNTER — OUTPATIENT (OUTPATIENT)
Dept: OUTPATIENT SERVICES | Facility: HOSPITAL | Age: 62
LOS: 1 days | Discharge: HOME | End: 2022-06-08

## 2022-06-08 VITALS
WEIGHT: 160 LBS | HEIGHT: 65 IN | BODY MASS INDEX: 26.66 KG/M2 | SYSTOLIC BLOOD PRESSURE: 122 MMHG | DIASTOLIC BLOOD PRESSURE: 82 MMHG

## 2022-06-08 DIAGNOSIS — Z98.890 OTHER SPECIFIED POSTPROCEDURAL STATES: Chronic | ICD-10-CM

## 2022-06-08 DIAGNOSIS — Z30.2 ENCOUNTER FOR STERILIZATION: Chronic | ICD-10-CM

## 2022-06-08 DIAGNOSIS — Z98.82 BREAST IMPLANT STATUS: Chronic | ICD-10-CM

## 2022-06-08 DIAGNOSIS — Z98.84 BARIATRIC SURGERY STATUS: Chronic | ICD-10-CM

## 2022-06-08 PROCEDURE — 99214 OFFICE O/P EST MOD 30 MIN: CPT | Mod: 25

## 2022-06-08 PROCEDURE — 51701 INSERT BLADDER CATHETER: CPT

## 2022-06-08 RX ORDER — DICLOFENAC SODIUM 1% 10 MG/G
1 GEL TOPICAL
Qty: 100 | Refills: 2 | Status: DISCONTINUED | COMMUNITY
Start: 2021-12-28 | End: 2022-06-08

## 2022-06-08 RX ORDER — ASPIRIN 81 MG
600-400 TABLET, DELAYED RELEASE (ENTERIC COATED) ORAL DAILY
Refills: 0 | Status: ACTIVE | COMMUNITY

## 2022-06-08 RX ORDER — MULTIVITAMIN
TABLET ORAL DAILY
Refills: 0 | Status: ACTIVE | COMMUNITY

## 2022-06-08 RX ORDER — SERTRALINE HYDROCHLORIDE 100 MG/1
100 TABLET, FILM COATED ORAL DAILY
Refills: 0 | Status: ACTIVE | COMMUNITY
Start: 2016-12-12

## 2022-06-08 RX ORDER — FOLIC ACID 1 MG/1
1 TABLET ORAL
Qty: 30 | Refills: 0 | Status: ACTIVE | COMMUNITY
Start: 2016-06-15

## 2022-06-08 RX ORDER — POLYETHYLENE GLYCOL 3350 AND ELECTROLYTES WITH LEMON FLAVOR 236; 22.74; 6.74; 5.86; 2.97 G/4L; G/4L; G/4L; G/4L; G/4L
236 POWDER, FOR SOLUTION ORAL
Qty: 1 | Refills: 0 | Status: DISCONTINUED | COMMUNITY
Start: 2022-01-28 | End: 2022-06-08

## 2022-06-08 NOTE — PHYSICAL EXAM
[Chaperone Present] : A chaperone was present in the examining room during all aspects of the physical examination [No Acute Distress] : in no acute distress [Well developed] : well developed [Well Nourished] : ~L well nourished [FreeTextEntry1] : Urethra was prepped in sterile fashion and then a sterile catheter (14F) was used by me to drain the bladder. The patient tolerated the procedure well.\par Void: 75 cc\par PVR: 70 cc

## 2022-06-08 NOTE — HISTORY OF PRESENT ILLNESS
[FreeTextEntry1] : The patient is here for follow up for mixed incontinence\par Last seen on 3/30/2022 for med check\par \par s/p Trospium 20 mg, no change\par s/p Oxybutynin ER 20 mg, side effects.\par s/p Myrbetriq 25 mg (nocturia)\par s/p Myrbetriq 50 mg daily: initially helped, but stopped helping\par s/p Vesicare 5mg: no change\par s/p Vesicare 10mg: no change in symptoms\par s/p PTNS x 11 sessions- no change in symptoms\par \par Underwent urodynamics: 3/17/21:\par Impression: decreased capacity (118cc), +D0I, +USUI\par Plan: third line treatments\par \par botox did not work at all\par \par Myrbetriq auth was denied\par \par Trospium 20 mg BID and Vesicare 10 in the morning:\par \par Today, patient states she is happy with Trospium 20 mg BID and Vesicare 10 in the morning and is noticing some improvement. She has a dry mouth and throat but does not know which medication caused it as she started both at the same time. She has been using sugar free candies but still has very dry throat. Patient does not feel she has an infection.\par \par Patient would like to continue Trospium 20 mg BID and Vesicare 10 mg in the morning\par

## 2022-06-08 NOTE — DISCUSSION/SUMMARY
[FreeTextEntry1] : \par Mixed Incontinence-\par Patient happy with Trospium 20 mg BID and Vesicare 10 mg in the morning.\par She will try Biotene mouthwash for the dry mouth and throat and if that helps, she may want to increase the dose of Vesicare and see if that helps her urinary symptoms. She will call the office if she feels that the medications are not helping enough. \par \par Advised that if the medications are not helping and incontinence is not improved, to consider Interstim placement. Patient would like to continue medications at this time. \par Refills provided. 30 days supply with 2 refills.\par Precautions reviewed.\par Will return in 3 months for follow up or earlier if she has any issues.\par \par

## 2022-06-08 NOTE — COUNSELING
[FreeTextEntry1] : \par If you feel like you have an infection it is important for you to call our office and we will arrange testing of your urine.\par \par Please continue taking Vesicare 10 mg in the morning and Trospium 20 mg twice a day for frequency. Refills sent to your pharmacy.\par \par Please use Biotene mouthwash (over the counter) to help with the dry mouth twice a day. You may also eat sugar free Lozenges or candy.\par \par Please call my office if you have any issues with the cost or side effects of the medication. \par \par Schedule a 3 months follow up med check appointment with SILVESTRE Alcantara or SILVESTRE Altamirano.\par

## 2022-06-10 ENCOUNTER — APPOINTMENT (OUTPATIENT)
Dept: GASTROENTEROLOGY | Facility: CLINIC | Age: 62
End: 2022-06-10

## 2022-06-10 ENCOUNTER — OUTPATIENT (OUTPATIENT)
Dept: OUTPATIENT SERVICES | Facility: HOSPITAL | Age: 62
LOS: 1 days | Discharge: HOME | End: 2022-06-10

## 2022-06-10 ENCOUNTER — NON-APPOINTMENT (OUTPATIENT)
Age: 62
End: 2022-06-10

## 2022-06-10 VITALS
BODY MASS INDEX: 26.66 KG/M2 | OXYGEN SATURATION: 97 % | HEART RATE: 77 BPM | SYSTOLIC BLOOD PRESSURE: 132 MMHG | HEIGHT: 65 IN | WEIGHT: 160 LBS | TEMPERATURE: 97 F | DIASTOLIC BLOOD PRESSURE: 81 MMHG

## 2022-06-10 DIAGNOSIS — Z98.890 OTHER SPECIFIED POSTPROCEDURAL STATES: Chronic | ICD-10-CM

## 2022-06-10 DIAGNOSIS — Z86.010 PERSONAL HISTORY OF COLONIC POLYPS: ICD-10-CM

## 2022-06-10 DIAGNOSIS — K86.89 OTHER SPECIFIED DISEASES OF PANCREAS: ICD-10-CM

## 2022-06-10 DIAGNOSIS — Z98.82 BREAST IMPLANT STATUS: Chronic | ICD-10-CM

## 2022-06-10 DIAGNOSIS — Z30.2 ENCOUNTER FOR STERILIZATION: Chronic | ICD-10-CM

## 2022-06-10 DIAGNOSIS — Z98.84 BARIATRIC SURGERY STATUS: Chronic | ICD-10-CM

## 2022-06-10 DIAGNOSIS — R10.13 EPIGASTRIC PAIN: ICD-10-CM

## 2022-06-10 PROCEDURE — ZZZZZ: CPT

## 2022-06-28 ENCOUNTER — LABORATORY RESULT (OUTPATIENT)
Age: 62
End: 2022-06-28

## 2022-06-30 NOTE — ASU PATIENT PROFILE, ADULT - FALL HARM RISK - HARM RISK INTERVENTIONS

## 2022-07-01 ENCOUNTER — TRANSCRIPTION ENCOUNTER (OUTPATIENT)
Age: 62
End: 2022-07-01

## 2022-07-01 ENCOUNTER — OUTPATIENT (OUTPATIENT)
Dept: OUTPATIENT SERVICES | Facility: HOSPITAL | Age: 62
LOS: 1 days | Discharge: HOME | End: 2022-07-01

## 2022-07-01 ENCOUNTER — APPOINTMENT (OUTPATIENT)
Dept: SURGERY | Facility: AMBULATORY SURGERY CENTER | Age: 62
End: 2022-07-01

## 2022-07-01 VITALS
DIASTOLIC BLOOD PRESSURE: 70 MMHG | OXYGEN SATURATION: 100 % | RESPIRATION RATE: 14 BRPM | SYSTOLIC BLOOD PRESSURE: 167 MMHG | TEMPERATURE: 97 F | HEART RATE: 70 BPM

## 2022-07-01 VITALS
SYSTOLIC BLOOD PRESSURE: 124 MMHG | HEIGHT: 65 IN | HEART RATE: 73 BPM | WEIGHT: 169.98 LBS | DIASTOLIC BLOOD PRESSURE: 75 MMHG | RESPIRATION RATE: 18 BRPM | TEMPERATURE: 98 F | OXYGEN SATURATION: 100 %

## 2022-07-01 DIAGNOSIS — Z98.890 OTHER SPECIFIED POSTPROCEDURAL STATES: Chronic | ICD-10-CM

## 2022-07-01 DIAGNOSIS — Z30.2 ENCOUNTER FOR STERILIZATION: Chronic | ICD-10-CM

## 2022-07-01 DIAGNOSIS — Z98.84 BARIATRIC SURGERY STATUS: Chronic | ICD-10-CM

## 2022-07-01 DIAGNOSIS — Z98.82 BREAST IMPLANT STATUS: Chronic | ICD-10-CM

## 2022-07-01 PROCEDURE — 49505 PRP I/HERN INIT REDUC >5 YR: CPT | Mod: LT

## 2022-07-01 RX ORDER — SODIUM CHLORIDE 9 MG/ML
1000 INJECTION, SOLUTION INTRAVENOUS
Refills: 0 | Status: DISCONTINUED | OUTPATIENT
Start: 2022-07-01 | End: 2022-07-15

## 2022-07-01 RX ORDER — ACETAMINOPHEN 500 MG
650 TABLET ORAL ONCE
Refills: 0 | Status: COMPLETED | OUTPATIENT
Start: 2022-07-01 | End: 2022-07-01

## 2022-07-01 RX ORDER — HYDROMORPHONE HYDROCHLORIDE 2 MG/ML
0.5 INJECTION INTRAMUSCULAR; INTRAVENOUS; SUBCUTANEOUS
Refills: 0 | Status: DISCONTINUED | OUTPATIENT
Start: 2022-07-01 | End: 2022-07-01

## 2022-07-01 RX ORDER — ONDANSETRON 8 MG/1
4 TABLET, FILM COATED ORAL ONCE
Refills: 0 | Status: DISCONTINUED | OUTPATIENT
Start: 2022-07-01 | End: 2022-07-15

## 2022-07-01 RX ORDER — OXYCODONE HYDROCHLORIDE 5 MG/1
5 TABLET ORAL ONCE
Refills: 0 | Status: DISCONTINUED | OUTPATIENT
Start: 2022-07-01 | End: 2022-07-01

## 2022-07-01 RX ADMIN — Medication 650 MILLIGRAM(S): at 10:22

## 2022-07-01 RX ADMIN — SODIUM CHLORIDE 100 MILLILITER(S): 9 INJECTION, SOLUTION INTRAVENOUS at 12:03

## 2022-07-01 RX ADMIN — HYDROMORPHONE HYDROCHLORIDE 0.5 MILLIGRAM(S): 2 INJECTION INTRAMUSCULAR; INTRAVENOUS; SUBCUTANEOUS at 12:22

## 2022-07-01 RX ADMIN — OXYCODONE HYDROCHLORIDE 5 MILLIGRAM(S): 5 TABLET ORAL at 13:03

## 2022-07-01 NOTE — PRE-ANESTHESIA EVALUATION ADULT - NSANTHOSAYNRD_GEN_A_CORE
No. AMADEO screening performed.  STOP BANG Legend: 0-2 = LOW Risk; 3-4 = INTERMEDIATE Risk; 5-8 = HIGH Risk

## 2022-07-01 NOTE — ASU DISCHARGE PLAN (ADULT/PEDIATRIC) - CARE PROVIDER_API CALL
Joey Bruno)  Surgery  [address] [facility]  [place] [address]  Phone: [phone]  Fax: [phone]  Scheduled Appointment: 07/11/2022

## 2022-07-01 NOTE — ASU DISCHARGE PLAN (ADULT/PEDIATRIC) - ASU DC SPECIAL INSTRUCTIONSFT
Diet    Eat light on the day of surgery. Nausea and vomiting can occur after anesthesia,   but usually resolve within 24 hours.  Resume normal diet the following day.      Activity    Rest!  No heavy lifting or strenuous activity.    Medications    Ibuprofen (Advil, Motrin), Naprosyn (Aleve) or Extra-Strength Tylenol for pain.  Hydrocodone for severe pain only.  Your prescription was sent electronically to your pharmacy.  Remember, hydrocodone is a strong narcotic pain reliever which can cause drowsiness, upset stomach and constipation.  It should always be taken with food.  You can use stool softener (Mineral Oil) or laxative (MiraLax or Dulcolax) if constipated.  An antibiotic is given during surgery.  No antibiotic needed at home.  Resume all previous medications.  Resume blood thinners the day after surgery unless told otherwise.    Wound Care    Leave surgical dressing in place.  May shower (dressing is waterproof.)  No pool, ocean, lake, hot-tub or   bath for 3 weeks. If you were given an abdominal binder, please wear it as much as possible (day & night)   for 2 weeks, but you must remove it for showers.  Ice packs to the area intermittently for several days help   with pain and swelling.  Bruising (“black and blue”) is common.  Treatment is…Ice & Rest.
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
## 2022-07-06 DIAGNOSIS — Z88.1 ALLERGY STATUS TO OTHER ANTIBIOTIC AGENTS STATUS: ICD-10-CM

## 2022-07-06 DIAGNOSIS — E03.9 HYPOTHYROIDISM, UNSPECIFIED: ICD-10-CM

## 2022-07-06 DIAGNOSIS — E78.00 PURE HYPERCHOLESTEROLEMIA, UNSPECIFIED: ICD-10-CM

## 2022-07-06 DIAGNOSIS — F32.A DEPRESSION, UNSPECIFIED: ICD-10-CM

## 2022-07-06 DIAGNOSIS — I10 ESSENTIAL (PRIMARY) HYPERTENSION: ICD-10-CM

## 2022-07-06 DIAGNOSIS — Z98.84 BARIATRIC SURGERY STATUS: ICD-10-CM

## 2022-07-06 DIAGNOSIS — F41.9 ANXIETY DISORDER, UNSPECIFIED: ICD-10-CM

## 2022-07-06 DIAGNOSIS — K40.90 UNILATERAL INGUINAL HERNIA, WITHOUT OBSTRUCTION OR GANGRENE, NOT SPECIFIED AS RECURRENT: ICD-10-CM

## 2022-07-12 NOTE — ASSESSMENT
[FreeTextEntry1] : 62 y/o FM w/ HTN, MD ENOC, Hx of Franky en y bypass (2018) , left inguinal hernia presents for surveillance colonoscopy. \par \par #Dyspepsia:\par Pt states she feels a bloating sensation after consumption of food within 1 hour and pain is crampy is 4/10 without exacerbating or relieving factors. Pain then self resolves. pt's bariatric surgeon is aware of symptoms and recommended modified diet plan. Symptoms resolves with Mylanta and gasex. \par Patient had EGD 3/28/22 which showed non-erosive gastritis , pathology: showed normal intestinal mucosa\par \par #encounter for surveillance colonoscopy\par - Last CF (2018) - 3 TAs removed. good prep\par -Patient had colonoscopy on 3/28/22 with poor prep and recommended to have colonoscopy with 2 days prep.\par \par Rec\par - will schedule for colonoscopy with 2 days prep\par \par \par # Dilated PD\par Patient had Pelvic MRI 3/3/22 which showed 4mm PD. repeat MRI on 5/7/22 sowed normal CBD and PD and pancreas.\par \par \par \par

## 2022-07-12 NOTE — HISTORY OF PRESENT ILLNESS
[Heartburn] : denies heartburn [Nausea] : denies nausea [Vomiting] : denies vomiting [Diarrhea] : denies diarrhea [Constipation] : denies constipation [Yellow Skin Or Eyes (Jaundice)] : denies jaundice [Abdominal Pain] : stable abdominal pain [Abdominal Swelling] : denies abdominal swelling [Rectal Pain] : denies rectal pain [de-identified] : 62 y/o FM w/ HTN, ENOC, MD, Hx of Franky en y bypass (2018) , left inguinal hernia presents for surveillance colonoscopy. \par \par Pt states she feels a bloating sensation after consumption of food within 1 hour and pain is crampy is 4/10 without exacerbating or relieving factors. Pain then self resolves. pt's bariatric surgeon is aware of symptoms and recommended modified diet plan. Symptoms resolves with Mylanta and gasex. \par Patient had EGD 3/28/22 which showed non-erosive gastritis , pathology: showed normal intestinal mucosa\par \par Patient had colonoscopy on 3/28/22 with poor prep and recommended to have colonoscopy with 2 days prep.\par \par Patient had Pelvic MRI 3/3/22 which showed 4mm PD. repeat MRI on 5/7/22 sowed normal CBD and PD and pancreas.\par \par \par \par Patient also \par \par

## 2022-07-26 ENCOUNTER — APPOINTMENT (OUTPATIENT)
Dept: SURGERY | Facility: CLINIC | Age: 62
End: 2022-07-26

## 2022-07-26 VITALS
HEIGHT: 65 IN | BODY MASS INDEX: 28.99 KG/M2 | HEART RATE: 56 BPM | SYSTOLIC BLOOD PRESSURE: 136 MMHG | DIASTOLIC BLOOD PRESSURE: 82 MMHG | OXYGEN SATURATION: 98 % | TEMPERATURE: 96 F | WEIGHT: 174 LBS

## 2022-07-26 DIAGNOSIS — K45.8 OTHER SPECIFIED ABDOMINAL HERNIA W/OUT OBSTRUCTION OR GANGRENE: ICD-10-CM

## 2022-07-26 DIAGNOSIS — E03.9 HYPOTHYROIDISM, UNSPECIFIED: ICD-10-CM

## 2022-07-26 DIAGNOSIS — I10 ESSENTIAL (PRIMARY) HYPERTENSION: ICD-10-CM

## 2022-07-26 DIAGNOSIS — F32.A DEPRESSION, UNSPECIFIED: ICD-10-CM

## 2022-07-26 PROCEDURE — 99212 OFFICE O/P EST SF 10 MIN: CPT | Mod: 24

## 2022-07-26 RX ORDER — LOSARTAN POTASSIUM 100 MG/1
100 TABLET, FILM COATED ORAL
Refills: 0 | Status: ACTIVE | COMMUNITY

## 2022-07-26 RX ORDER — POLYETHYLENE GLYCOL 3350, SODIUM CHLORIDE, SODIUM BICARBONATE AND POTASSIUM CHLORIDE WITH LEMON FLAVOR 420; 11.2; 5.72; 1.48 G/4L; G/4L; G/4L; G/4L
420 POWDER, FOR SOLUTION ORAL
Qty: 4000 | Refills: 0 | Status: COMPLETED | COMMUNITY
Start: 2022-01-28

## 2022-07-26 RX ORDER — HYDROCODONE BITARTRATE AND ACETAMINOPHEN 5; 325 MG/1; MG/1
5-325 TABLET ORAL
Qty: 24 | Refills: 0 | Status: COMPLETED | COMMUNITY
Start: 2022-07-01

## 2022-07-26 NOTE — PLAN
[FreeTextEntry1] : Plan: Continue with behavioral changes.\par          Blood work.\par          RTO in 1 year.\par          Call with concerns.

## 2022-07-26 NOTE — ASSESSMENT
[FreeTextEntry1] : SLIME COHEN is a 61 year female seen today for Bariatric follow up visit. \par Patient continues to plan her meals and is focusing on small portions..\par Breakfast - protein shake or oatmeal. Lunch - mixed green salad with seafood salad or chicken soup.\par Dinner - grilled chicken, fish or lamb chops with grilled vegetables.\par Fluid intake is adequate with either water and an extra large cup of coffee with Splenda. \par She recently had hernia surgery and has not returned to her formal exercise routine but plans to resume Pilates on Zoom when cleared by surgeon. \par  \par  \par \par  \par

## 2022-07-26 NOTE — PHYSICAL EXAM
[Normal] : normoactive bowel sounds, soft and nontender, no hepatosplenomegaly or masses appreciated. Incisions healing appropriately without erythema or drainage [de-identified] : Soft, nondistended

## 2022-07-26 NOTE — HISTORY OF PRESENT ILLNESS
[de-identified] : Patient had surgery approximately 4 years ago. She saw my colleague Dr. Alvarado in February following internal hernia repair.   \par She denies heartburn/reflux.\par Her back and knee pain, stress incontinence and hypertension are unchanged.\par  [de-identified] : s/p laparoscopic reduction of internal hernia, repair of mesenteric defect on 2/1/2022 by Dr. Alvarado.\par s/p left inguinal hernia repair with mesh on 7/1/2022  by Dr. Bruno.

## 2022-07-27 ENCOUNTER — APPOINTMENT (OUTPATIENT)
Dept: SURGERY | Facility: CLINIC | Age: 62
End: 2022-07-27

## 2022-07-27 PROCEDURE — 99024 POSTOP FOLLOW-UP VISIT: CPT

## 2022-07-27 NOTE — CONSULT LETTER
[Dear  ___] : Dear  [unfilled], [Courtesy Letter:] : I had the pleasure of seeing your patient, [unfilled], in my office today. [Please see my note below.] : Please see my note below. [Consult Closing:] : Thank you very much for allowing me to participate in the care of this patient.  If you have any questions, please do not hesitate to contact me. [FreeTextEntry3] : \par Sincerely,\par \par Amy Green PA-C, DANIEL\par

## 2022-07-27 NOTE — ASSESSMENT
[FreeTextEntry1] : ELVIRA COHEN underwent a left inguinal hernia repair with mesh with Dr. Bruno on 7/1/22  under local IV sedation without any problems or complications. Her post-operative visit was delayed due to a Covid infection discovered approximately 1 week after surgery. Elvira was concerned about possible hernia recurrence due to the severity of her cough.\par \par Her wound is clean, dry and intact. There is no evidence of hernia recurrence at this time. There is no evidence of erythema, seroma formation or infection. She is tolerating a diet and having normal bowel movements. She denies any significant postoperative pain or discomfort at this time.\par \par She was counseled and reassured. ELVIRA was discharged from the office with no specific follow up necessary, but she knows to avoid any heavy lifting or strenuous activity for the next several weeks.\par

## 2022-07-29 ENCOUNTER — APPOINTMENT (OUTPATIENT)
Dept: UROGYNECOLOGY | Facility: CLINIC | Age: 62
End: 2022-07-29

## 2022-08-05 NOTE — ASU PREOP CHECKLIST - DNR CLARIFICATION FORM COMPLETED
Detail Level: Detailed
Quality 130: Documentation Of Current Medications In The Medical Record: Current Medications Documented
n/a

## 2022-08-12 ENCOUNTER — OUTPATIENT (OUTPATIENT)
Dept: OUTPATIENT SERVICES | Facility: HOSPITAL | Age: 62
LOS: 1 days | Discharge: HOME | End: 2022-08-12

## 2022-08-12 ENCOUNTER — APPOINTMENT (OUTPATIENT)
Dept: UROGYNECOLOGY | Facility: CLINIC | Age: 62
End: 2022-08-12

## 2022-08-12 VITALS — WEIGHT: 174 LBS | BODY MASS INDEX: 28.96 KG/M2 | SYSTOLIC BLOOD PRESSURE: 130 MMHG | DIASTOLIC BLOOD PRESSURE: 70 MMHG

## 2022-08-12 DIAGNOSIS — Z98.82 BREAST IMPLANT STATUS: Chronic | ICD-10-CM

## 2022-08-12 DIAGNOSIS — Z98.890 OTHER SPECIFIED POSTPROCEDURAL STATES: Chronic | ICD-10-CM

## 2022-08-12 DIAGNOSIS — Z30.2 ENCOUNTER FOR STERILIZATION: Chronic | ICD-10-CM

## 2022-08-12 DIAGNOSIS — Z98.84 BARIATRIC SURGERY STATUS: Chronic | ICD-10-CM

## 2022-08-12 PROCEDURE — 99214 OFFICE O/P EST MOD 30 MIN: CPT

## 2022-08-12 RX ORDER — HYDROXYZINE PAMOATE 25 MG/1
25 CAPSULE ORAL
Qty: 20 | Refills: 0 | Status: DISCONTINUED | COMMUNITY
Start: 2022-04-24

## 2022-08-12 RX ORDER — SOLIFENACIN SUCCINATE 10 MG/1
10 TABLET ORAL
Qty: 30 | Refills: 2 | Status: DISCONTINUED | COMMUNITY
Start: 2022-03-30 | End: 2022-08-12

## 2022-08-12 NOTE — HISTORY OF PRESENT ILLNESS
[FreeTextEntry1] : Patient is here for  2 months med check for mixed incontinence.\par Last seen on 6/18/2022 for med check.\par \par s/p Trospium 20 mg, no change\par s/p Oxybutynin ER 20 mg, side effects.\par s/p Myrbetriq 25 mg (nocturia)\par s/p Myrbetriq 50 mg daily: initially helped, but stopped helping\par s/p Vesicare 5mg: no change\par s/p Vesicare 10mg: no change in symptoms\par s/p PTNS x 11 sessions- no change in symptoms\par \par Underwent urodynamics: 3/17/21:\par Impression: decreased capacity (118cc), +D0I, +USUI\par Plan: third line treatments\par \par botox did not work at all\par \par Myrbetriq auth was denied\par \par Trospium 20 mg BID and Vesicare 10 in the morning:\par \par Today, patient states she did not  refills as the co-pay was too expensive. She has been taking oxybutynin she has at home, but that is causing dry mouth. She is most bothered by frequency and leakage of urine at  nighttime. She saw khloe on TV and thinks that may help her sleep more at night. She cannot sleep through the night and would like to try something to help her be able to sleep through the night. Patient does not feel she has an infection.\par \par Patient would like to hear about her options\par

## 2022-08-12 NOTE — DISCUSSION/SUMMARY
[FreeTextEntry1] : \par Mixed Incontinence-\par Discussed with patient Interstim-sacral neuromodulation device to help with the leakage of urine, gave the patient a handout with information. She is interested in hearing more about it. She will be scheduled for surgical counseling with Dr. Mitchell\par \par In the meantime, she spoke with her insurance and would like to go back on the Trospium and Vesicare and would like a prescription for Biotene mouth wash as her throat was dry and the mouthwash was expensive.\par Rx Trospium 20 mg BID\par Rx Vesicare 10 mg in the morning\par Biotene mouthwash \par Precautions reviewed.\par \par Discussed behavioral modifications to help with nighttime frequency as that is most bothersome to the patient\par \par

## 2022-08-12 NOTE — COUNSELING
[FreeTextEntry1] : If you feel like you have an infection it is important for you to call our office and we will arrange testing of your urine.\par \par Please begin taking Trospium 20 mg twice a day and Vesicare 10 mg in the morning. It takes up to 6 weeks to go into full effect. Please  your refill when you complete the 1st bottle.\par \par Please use Biotene mouthwash (over the counter) to help with the dry mouth twice a day. You may also eat sugar free Lozenges or candy.\par \par Avoid liquid intake 2 hours before bedtime. It is recommended that you take sips of water to take your medications before bedtime. \par \par Elevate your legs throughout the day, particularly 2 hours before bedtime. \par \par Please call my office if you have any issues with the cost or side effects of the medication. \par \par Schedule a surgical counseling visit with Dr. Mitchell

## 2022-08-15 NOTE — ED ADULT NURSE NOTE - NS ED NOTE  TALK SOMEONE YN
----- Message from Vimal Velasquez MD sent at 8/15/2022 11:42 AM CDT -----  Call patient.  EMG study showed Moderate Peripheral Polyneuropathy involving both legs.  In addition, he has a mild Left L5 chronic Radiculopathy(pinched nerve).  No acute findings.  Doesn't need an MRI.  For the Peripheral Neuropathy, medications don't help the numbness. If his numbness in his feet become painful, then can try a nerve pain medication such as Gabapentin or Pregalbin.   
Discussed the patient's EMG results as listed below with them. The patient has verbalized understanding and has no further questions/concerns at this time.   
Left message for pt to call clinic back    Call Center - please secure chat [facility] Triage team on call back.     
No

## 2022-08-15 NOTE — ED ADULT NURSE NOTE - NSIMPLEMENTINTERV_GEN_ALL_ED
within normal limits Implemented All Universal Safety Interventions:  [place] to call system. Call bell, personal items and telephone within reach. Instruct patient to call for assistance. Room bathroom lighting operational. Non-slip footwear when patient is off stretcher. Physically safe environment: no spills, clutter or unnecessary equipment. Stretcher in lowest position, wheels locked, appropriate side rails in place. English

## 2022-08-17 ENCOUNTER — APPOINTMENT (OUTPATIENT)
Dept: UROGYNECOLOGY | Facility: CLINIC | Age: 62
End: 2022-08-17

## 2022-08-17 ENCOUNTER — OUTPATIENT (OUTPATIENT)
Dept: OUTPATIENT SERVICES | Facility: HOSPITAL | Age: 62
LOS: 1 days | Discharge: HOME | End: 2022-08-17

## 2022-08-17 VITALS — DIASTOLIC BLOOD PRESSURE: 78 MMHG | SYSTOLIC BLOOD PRESSURE: 132 MMHG

## 2022-08-17 DIAGNOSIS — Z98.890 OTHER SPECIFIED POSTPROCEDURAL STATES: Chronic | ICD-10-CM

## 2022-08-17 DIAGNOSIS — Z98.84 BARIATRIC SURGERY STATUS: Chronic | ICD-10-CM

## 2022-08-17 DIAGNOSIS — Z98.82 BREAST IMPLANT STATUS: Chronic | ICD-10-CM

## 2022-08-17 DIAGNOSIS — Z30.2 ENCOUNTER FOR STERILIZATION: Chronic | ICD-10-CM

## 2022-08-17 PROCEDURE — 99215 OFFICE O/P EST HI 40 MIN: CPT

## 2022-08-17 NOTE — COUNSELING
[FreeTextEntry1] : \par Please continue to take the trospium twice a day and the vesicare in the morning\par \par Please schedule a 6 week med check with my PA, Maricruz. We need to check to see how well you empty your bladder while taking both medications\par \par Please call with any issues\par \par I will work on finding surgical dates for the interstim in October

## 2022-08-17 NOTE — HISTORY OF PRESENT ILLNESS
[FreeTextEntry1] : \par The patient is here for surgical counseling for mixed incontinence\par Last seen on 8/12/2022 where she was advised to restart trospium 20mg twice a day and vesicare 10mg in the morning. She was also advised to start biotene for dry mouth\par \par She started it today. If she does not have enough improvement of her symptoms, or if she does not empty well or if she continues to have side effects then she would like to consider interstim placement\par \par s/p Trospium 20 mg, no change\par s/p Oxybutynin ER 20 mg, side effects.\par s/p Myrbetriq 25 mg (nocturia)\par s/p Myrbetriq 50 mg daily: initially helped, but stopped helping\par s/p Vesicare 5mg: no change\par s/p Vesicare 10mg: no change in symptoms\par s/p PTNS x 11 sessions- no change in symptoms\par \par Underwent urodynamics: 3/17/21:\par Impression: decreased capacity (118cc), +D0I, +USUI\par Plan: third line treatments\par \par Would be interested in interstim placement in October 2022

## 2022-08-17 NOTE — DISCUSSION/SUMMARY
[FreeTextEntry1] : \par Mixed incontinence-\par The surgical procedure of Medtronic Interstim Stage I was reviewed. The patient was advised that the surgery does not improve stress incontinence. The postoperative restrictions were reviewed. All of the patient's questions and concerns were answered. The risks and benefits and alternatives of the above procedures were reviewed and informed consent was signed. The patient will be scheduled for surgery, preop lab testing and preop medical eval.\par \par

## 2022-09-30 NOTE — PRE-ANESTHESIA EVALUATION ADULT - NSANTHDISPORD_GEN_ALL_CORE
Nucynta 50 mg oral tablet , 1 to 2 tab(s) orally every 6 hours, As Needed for moderate to severe pain MDD:8     Senna S 50 mg-8.6 mg oral tablet , 2 tab(s) orally once a day (at bedtime), As Needed -for constipation MDD:2     Med to bed  Ocrevus 300 mg/10 mL intravenous solution , intravenous every 6 months  Saxenda 18 mg/3 mL subcutaneous solution , 1  subcutaneous once a day  valsartan 160 mg oral tablet , 1 tab(s) orally once a day  Myrbetriq 25 mg oral tablet, extended release , 1 tab(s) orally once a day  MiraLax oral powder for reconstitution , 1 packet(s) orally once a day  Jardiance 25 mg oral tablet , 1 tab(s) orally once a day (in the morning)  cetirizine 10 mg oral tablet , 1 tab(s) orally once a day  Wellbutrin  mg/24 hours oral tablet, extended release , 3 tab(s) orally every 24 hours  aspirin 81 mg oral delayed release tablet , 1 tab(s) orally once a day  Vitamin D3 2000 intl units oral capsule , 1 cap(s) orally once a day  Rapaflo 8 mg oral capsule , 1 cap(s) orally once a day  amLODIPine 5 mg oral tablet , 1 tab(s) orally once a day  atorvastatin 10 mg oral tablet , 1 tab(s) orally once a day  metFORMIN 1000 mg oral tablet , 1 tab(s) orally 2 times a day  venlafaxine 225 mg oral tablet, extended release , 1 tab(s) orally once a day  
PACU

## 2022-10-02 ENCOUNTER — LABORATORY RESULT (OUTPATIENT)
Age: 62
End: 2022-10-02

## 2022-10-03 ENCOUNTER — APPOINTMENT (OUTPATIENT)
Dept: UROGYNECOLOGY | Facility: CLINIC | Age: 62
End: 2022-10-03

## 2022-10-03 ENCOUNTER — OUTPATIENT (OUTPATIENT)
Dept: OUTPATIENT SERVICES | Facility: HOSPITAL | Age: 62
LOS: 1 days | Discharge: HOME | End: 2022-10-03

## 2022-10-03 VITALS — BODY MASS INDEX: 30.45 KG/M2 | DIASTOLIC BLOOD PRESSURE: 80 MMHG | WEIGHT: 183 LBS | SYSTOLIC BLOOD PRESSURE: 124 MMHG

## 2022-10-03 DIAGNOSIS — Z30.2 ENCOUNTER FOR STERILIZATION: Chronic | ICD-10-CM

## 2022-10-03 DIAGNOSIS — Z98.82 BREAST IMPLANT STATUS: Chronic | ICD-10-CM

## 2022-10-03 DIAGNOSIS — Z98.84 BARIATRIC SURGERY STATUS: Chronic | ICD-10-CM

## 2022-10-03 DIAGNOSIS — Z98.890 OTHER SPECIFIED POSTPROCEDURAL STATES: Chronic | ICD-10-CM

## 2022-10-03 PROCEDURE — 51701 INSERT BLADDER CATHETER: CPT

## 2022-10-03 PROCEDURE — 99214 OFFICE O/P EST MOD 30 MIN: CPT | Mod: 25

## 2022-10-03 NOTE — COUNSELING
[FreeTextEntry1] : \par If you feel like you have an infection it is important for you to call our office and we will arrange testing of your urine.\par \par Please continue to take the trospium twice a day and the vesicare in the morning\par \par We will contact you if the urine results are abnormal.\par \par Please use Biotene mouthwash (over the counter) to help with the dry mouth. \par \par Please call if you have any questions \par \par \par \par

## 2022-10-03 NOTE — PHYSICAL EXAM
[Chaperone Present] : A chaperone was present in the examining room during all aspects of the physical examination [No Acute Distress] : in no acute distress [Well developed] : well developed [Well Nourished] : ~L well nourished [FreeTextEntry1] : Indication: Mixed incontinence\par Urethra was prepped in sterile fashion and then a sterile non- indwelling catheter (14F) was used by me to drain the bladder. The patient tolerated the procedure well.\par void: 100 cc\par PVR: 60 cc\par \par

## 2022-10-03 NOTE — HISTORY OF PRESENT ILLNESS
[FreeTextEntry1] : Patient is here for 6 weeks med check for urge incontinence.\par Last seen on 8/17/2022 for surgical counseling\par \par s/p Trospium 20 mg, no change\par s/p Oxybutynin ER 20 mg, side effects.\par s/p Myrbetriq 25 mg (nocturia)\par s/p Myrbetriq 50 mg daily: initially helped, but stopped helping\par s/p Vesicare 5mg: no change\par s/p Vesicare 10mg: no change in symptoms\par s/p PTNS x 11 sessions- no change in symptoms\par \par Underwent urodynamics: 3/17/21:\par Impression: decreased capacity (118cc), +D0I, +USUI\par Plan: third line treatments\par \par Scheduled for interstim placement in October 2022 \par \par Trospium 20 mg BID\par Vesicare 10 mg in the morning\par \par Today, patient states she is happy with Trospium and Vesicare and is noticing improvement. Denies side effects. Patient does not feel she has an infection.\par \par Patient would like to continue Trospium and Vesicare and would like a refill on Biotene mouthwash\par \par She went for clearance from her primary care doctor but was told she needs a sleep study, advised to call to schedule an appointment. \par

## 2022-10-03 NOTE — DISCUSSION/SUMMARY
[FreeTextEntry1] : Mixed Incontinence-\par Patient happy with Trospium 20 mg BID and Vesicare 10 mg in the morning\par Patient would like refill for Biotene as well \par Refills provided. days supply with refills.\par Precautions reviewed.\par PVR WNL \par \par Patient is scheduled for Interstim in October, advised to call with any questions \par \par

## 2022-10-05 ENCOUNTER — TRANSCRIPTION ENCOUNTER (OUTPATIENT)
Age: 62
End: 2022-10-05

## 2022-10-05 ENCOUNTER — RESULT REVIEW (OUTPATIENT)
Age: 62
End: 2022-10-05

## 2022-10-05 ENCOUNTER — OUTPATIENT (OUTPATIENT)
Dept: OUTPATIENT SERVICES | Facility: HOSPITAL | Age: 62
LOS: 1 days | Discharge: HOME | End: 2022-10-05

## 2022-10-05 VITALS
RESPIRATION RATE: 18 BRPM | OXYGEN SATURATION: 96 % | DIASTOLIC BLOOD PRESSURE: 85 MMHG | SYSTOLIC BLOOD PRESSURE: 140 MMHG | HEART RATE: 68 BPM

## 2022-10-05 VITALS
HEART RATE: 67 BPM | TEMPERATURE: 97 F | HEIGHT: 65 IN | DIASTOLIC BLOOD PRESSURE: 88 MMHG | WEIGHT: 177.91 LBS | RESPIRATION RATE: 18 BRPM | SYSTOLIC BLOOD PRESSURE: 166 MMHG

## 2022-10-05 DIAGNOSIS — Z98.890 OTHER SPECIFIED POSTPROCEDURAL STATES: Chronic | ICD-10-CM

## 2022-10-05 DIAGNOSIS — Z30.2 ENCOUNTER FOR STERILIZATION: Chronic | ICD-10-CM

## 2022-10-05 DIAGNOSIS — Z98.82 BREAST IMPLANT STATUS: Chronic | ICD-10-CM

## 2022-10-05 DIAGNOSIS — Z98.84 BARIATRIC SURGERY STATUS: Chronic | ICD-10-CM

## 2022-10-05 PROCEDURE — 88305 TISSUE EXAM BY PATHOLOGIST: CPT | Mod: 26

## 2022-10-05 PROCEDURE — 45380 COLONOSCOPY AND BIOPSY: CPT

## 2022-10-05 NOTE — ASU DISCHARGE PLAN (ADULT/PEDIATRIC) - NS MD DC FALL RISK RISK
For information on Fall & Injury Prevention, visit: [email or web address] OR  [email or web address] OR  https://www.cdc.gov/steadi/patient.html

## 2022-10-05 NOTE — H&P PST ADULT - HISTORY OF PRESENT ILLNESS
60 y/o FM w/ HTN, MD ENOC, Hx of Franky en y bypass (2018) , left inguinal hernia presents for surveillance colonoscopy.   colonoscopy on 3/28/22 with poor prep and recommended to have colonoscopy with 2 days prep.  < from: Colonoscopy (04.12.22 @ 15:30) >  Impressions:    Solid stool noted in whole colon. .    Internal hemorrhoids.     < end of copied text >

## 2022-10-05 NOTE — ASU PREOP CHECKLIST - AS BP NONINV METHOD
See other encounter reply sent   Back in office tomorrow, they will call in am about appointment time to offer.   electronic

## 2022-10-05 NOTE — CHART NOTE - NSCHARTNOTEFT_GEN_A_CORE
PACU ANESTHESIA ADMISSION NOTE      Procedure: Olonoscopy  Post op diagnosis:      ____  Intubated  TV:______       Rate: ______      FiO2: ______    _x___  Patent Airway    _x___  Full return of protective reflexes    _x___  Full recovery from anesthesia / back to baseline status    Vitals:  T(C): 36.2 (10-05-22 @ 07:52), Max: 36.2 (10-05-22 @ 07:46)  HR:65  BP: 120/67  RR: 18 (10-05-22 @ 07:52) (18 - 18)  SpO2: 99    Mental Status:  _x___ Awake   _____ Alert   _____ Drowsy   _____ Sedated    Nausea/Vomiting:  _x___  NO       ______Yes,   See Post - Op Orders         Pain Scale (0-10):  __0___    Treatment: _x___ None    ____ See Post - Op/PCA Orders    Post - Operative Fluids:   __x__ Oral   ____ See Post - Op Orders    Plan: Discharge:   _x___Home       _____Floor     _____Critical Care    _____  Other:_________________    Comments:  No anesthesia issues or complications noted.  Discharge when criteria met.

## 2022-10-05 NOTE — ASU PATIENT PROFILE, ADULT - FALL HARM RISK - HARM RISK INTERVENTIONS

## 2022-10-05 NOTE — ASU PATIENT PROFILE, ADULT - NSICDXPASTSURGICALHX_GEN_ALL_CORE_FT
PAST SURGICAL HISTORY:  Admission for tubal ligation     H/O arthroscopy of left knee 20 yrs ago    H/O breast augmentation     H/O gastric bypass 2019    H/O hernia repair abdominal 07/2022    H/O plastic surgery abdominal plasty    H/O right inguinal hernia repair 2014    History of bladder surgery "with botox that failed"    History of umbilical hernia repair

## 2022-10-05 NOTE — ASU DISCHARGE PLAN (ADULT/PEDIATRIC) - ASU DC SPECIAL INSTRUCTIONSFT
-Follow up with our [facility] located at [address] [place] [address]. Phone Number: [phone]

## 2022-10-06 LAB
SURGICAL PATHOLOGY STUDY: SIGNIFICANT CHANGE UP
URINE CULTURE <10: NORMAL

## 2022-10-07 DIAGNOSIS — E03.9 HYPOTHYROIDISM, UNSPECIFIED: ICD-10-CM

## 2022-10-07 DIAGNOSIS — Z86.010 PERSONAL HISTORY OF COLONIC POLYPS: ICD-10-CM

## 2022-10-07 DIAGNOSIS — K21.00 GASTRO-ESOPHAGEAL REFLUX DISEASE WITH ESOPHAGITIS, WITHOUT BLEEDING: ICD-10-CM

## 2022-10-07 DIAGNOSIS — D12.3 BENIGN NEOPLASM OF TRANSVERSE COLON: ICD-10-CM

## 2022-10-07 DIAGNOSIS — I10 ESSENTIAL (PRIMARY) HYPERTENSION: ICD-10-CM

## 2022-10-07 DIAGNOSIS — Z88.1 ALLERGY STATUS TO OTHER ANTIBIOTIC AGENTS STATUS: ICD-10-CM

## 2022-10-07 DIAGNOSIS — E78.00 PURE HYPERCHOLESTEROLEMIA, UNSPECIFIED: ICD-10-CM

## 2022-10-07 DIAGNOSIS — Z98.84 BARIATRIC SURGERY STATUS: ICD-10-CM

## 2022-10-07 DIAGNOSIS — F32.A DEPRESSION, UNSPECIFIED: ICD-10-CM

## 2022-10-07 DIAGNOSIS — Z12.11 ENCOUNTER FOR SCREENING FOR MALIGNANT NEOPLASM OF COLON: ICD-10-CM

## 2022-10-07 DIAGNOSIS — N32.81 OVERACTIVE BLADDER: ICD-10-CM

## 2022-10-11 ENCOUNTER — RESULT REVIEW (OUTPATIENT)
Age: 62
End: 2022-10-11

## 2022-10-11 ENCOUNTER — OUTPATIENT (OUTPATIENT)
Dept: OUTPATIENT SERVICES | Facility: HOSPITAL | Age: 62
LOS: 1 days | Discharge: HOME | End: 2022-10-11

## 2022-10-11 VITALS
RESPIRATION RATE: 16 BRPM | WEIGHT: 186.95 LBS | HEART RATE: 70 BPM | DIASTOLIC BLOOD PRESSURE: 81 MMHG | OXYGEN SATURATION: 99 % | HEIGHT: 65 IN | TEMPERATURE: 97 F | SYSTOLIC BLOOD PRESSURE: 141 MMHG

## 2022-10-11 DIAGNOSIS — N39.46 MIXED INCONTINENCE: ICD-10-CM

## 2022-10-11 DIAGNOSIS — Z98.82 BREAST IMPLANT STATUS: Chronic | ICD-10-CM

## 2022-10-11 DIAGNOSIS — Z98.890 OTHER SPECIFIED POSTPROCEDURAL STATES: Chronic | ICD-10-CM

## 2022-10-11 DIAGNOSIS — Z01.818 ENCOUNTER FOR OTHER PREPROCEDURAL EXAMINATION: ICD-10-CM

## 2022-10-11 DIAGNOSIS — Z98.84 BARIATRIC SURGERY STATUS: Chronic | ICD-10-CM

## 2022-10-11 DIAGNOSIS — Z30.2 ENCOUNTER FOR STERILIZATION: Chronic | ICD-10-CM

## 2022-10-11 LAB
ALBUMIN SERPL ELPH-MCNC: 4.4 G/DL — SIGNIFICANT CHANGE UP (ref 3.5–5.2)
ALP SERPL-CCNC: 77 U/L — SIGNIFICANT CHANGE UP (ref 30–115)
ALT FLD-CCNC: 17 U/L — SIGNIFICANT CHANGE UP (ref 0–41)
ANION GAP SERPL CALC-SCNC: 12 MMOL/L — SIGNIFICANT CHANGE UP (ref 7–14)
APTT BLD: 35.6 SEC — SIGNIFICANT CHANGE UP (ref 27–39.2)
AST SERPL-CCNC: 18 U/L — SIGNIFICANT CHANGE UP (ref 0–41)
BASOPHILS # BLD AUTO: 0.03 K/UL — SIGNIFICANT CHANGE UP (ref 0–0.2)
BASOPHILS NFR BLD AUTO: 0.5 % — SIGNIFICANT CHANGE UP (ref 0–1)
BILIRUB SERPL-MCNC: 0.4 MG/DL — SIGNIFICANT CHANGE UP (ref 0.2–1.2)
BUN SERPL-MCNC: 11 MG/DL — SIGNIFICANT CHANGE UP (ref 10–20)
CALCIUM SERPL-MCNC: 9.4 MG/DL — SIGNIFICANT CHANGE UP (ref 8.4–10.5)
CHLORIDE SERPL-SCNC: 101 MMOL/L — SIGNIFICANT CHANGE UP (ref 98–110)
CO2 SERPL-SCNC: 29 MMOL/L — SIGNIFICANT CHANGE UP (ref 17–32)
CREAT SERPL-MCNC: 0.6 MG/DL — LOW (ref 0.7–1.5)
EGFR: 102 ML/MIN/1.73M2 — SIGNIFICANT CHANGE UP
EOSINOPHIL # BLD AUTO: 0.05 K/UL — SIGNIFICANT CHANGE UP (ref 0–0.7)
EOSINOPHIL NFR BLD AUTO: 0.8 % — SIGNIFICANT CHANGE UP (ref 0–8)
GLUCOSE SERPL-MCNC: 76 MG/DL — SIGNIFICANT CHANGE UP (ref 70–99)
HCT VFR BLD CALC: 38.6 % — SIGNIFICANT CHANGE UP (ref 37–47)
HGB BLD-MCNC: 12.8 G/DL — SIGNIFICANT CHANGE UP (ref 12–16)
IMM GRANULOCYTES NFR BLD AUTO: 0.2 % — SIGNIFICANT CHANGE UP (ref 0.1–0.3)
INR BLD: 1 RATIO — SIGNIFICANT CHANGE UP (ref 0.65–1.3)
LYMPHOCYTES # BLD AUTO: 2.4 K/UL — SIGNIFICANT CHANGE UP (ref 1.2–3.4)
LYMPHOCYTES # BLD AUTO: 37.2 % — SIGNIFICANT CHANGE UP (ref 20.5–51.1)
MCHC RBC-ENTMCNC: 29.4 PG — SIGNIFICANT CHANGE UP (ref 27–31)
MCHC RBC-ENTMCNC: 33.2 G/DL — SIGNIFICANT CHANGE UP (ref 32–37)
MCV RBC AUTO: 88.5 FL — SIGNIFICANT CHANGE UP (ref 81–99)
MONOCYTES # BLD AUTO: 0.48 K/UL — SIGNIFICANT CHANGE UP (ref 0.1–0.6)
MONOCYTES NFR BLD AUTO: 7.4 % — SIGNIFICANT CHANGE UP (ref 1.7–9.3)
NEUTROPHILS # BLD AUTO: 3.48 K/UL — SIGNIFICANT CHANGE UP (ref 1.4–6.5)
NEUTROPHILS NFR BLD AUTO: 53.9 % — SIGNIFICANT CHANGE UP (ref 42.2–75.2)
NRBC # BLD: 0 /100 WBCS — SIGNIFICANT CHANGE UP (ref 0–0)
PLATELET # BLD AUTO: 270 K/UL — SIGNIFICANT CHANGE UP (ref 130–400)
POTASSIUM SERPL-MCNC: 3.9 MMOL/L — SIGNIFICANT CHANGE UP (ref 3.5–5)
POTASSIUM SERPL-SCNC: 3.9 MMOL/L — SIGNIFICANT CHANGE UP (ref 3.5–5)
PROT SERPL-MCNC: 7.4 G/DL — SIGNIFICANT CHANGE UP (ref 6–8)
PROTHROM AB SERPL-ACNC: 11.4 SEC — SIGNIFICANT CHANGE UP (ref 9.95–12.87)
RBC # BLD: 4.36 M/UL — SIGNIFICANT CHANGE UP (ref 4.2–5.4)
RBC # FLD: 12.4 % — SIGNIFICANT CHANGE UP (ref 11.5–14.5)
SODIUM SERPL-SCNC: 142 MMOL/L — SIGNIFICANT CHANGE UP (ref 135–146)
WBC # BLD: 6.45 K/UL — SIGNIFICANT CHANGE UP (ref 4.8–10.8)
WBC # FLD AUTO: 6.45 K/UL — SIGNIFICANT CHANGE UP (ref 4.8–10.8)

## 2022-10-11 PROCEDURE — 71046 X-RAY EXAM CHEST 2 VIEWS: CPT | Mod: 26

## 2022-10-11 PROCEDURE — 93010 ELECTROCARDIOGRAM REPORT: CPT

## 2022-10-11 RX ORDER — FOLIC ACID 0.8 MG
1 TABLET ORAL
Qty: 0 | Refills: 0 | DISCHARGE

## 2022-10-11 RX ORDER — SOLIFENACIN SUCCINATE 10 MG/1
1 TABLET ORAL
Qty: 0 | Refills: 0 | DISCHARGE

## 2022-10-11 RX ORDER — LOSARTAN/HYDROCHLOROTHIAZIDE 100MG-25MG
1 TABLET ORAL
Qty: 0 | Refills: 0 | DISCHARGE

## 2022-10-11 RX ORDER — SERTRALINE 25 MG/1
1 TABLET, FILM COATED ORAL
Qty: 0 | Refills: 0 | DISCHARGE

## 2022-10-11 RX ORDER — TROSPIUM CHLORIDE 20 MG/1
1 TABLET, FILM COATED ORAL
Qty: 0 | Refills: 0 | DISCHARGE

## 2022-10-11 RX ORDER — LEVOTHYROXINE SODIUM 125 MCG
1 TABLET ORAL
Qty: 0 | Refills: 0 | DISCHARGE

## 2022-10-11 NOTE — H&P PST ADULT - HISTORY OF PRESENT ILLNESS
Pt states over the past 2 years she has had problems with overactive bladder. Admits to frequent urination at all times during the day and night which prevents her from getting adequate rest. Tried conservative medication management but denies success. Now for listed procedure.    Denies any chest pain, difficulty breathing, SOB, palpitations, dysuria, URI, or any other infections in the last 2 weeks. Denies any recent travel, contact, or exposure to any persons with known or suspected COVID-19. Pt also denies COVID testing within the last 2 weeks. Pt admits to receiving all doses of Moderna COVID vaccine. Denies any suicidal or homicidal ideations. Pt advised to self quarantine until day of procedure. Exercise tolerance of 2-3 flights of stairs without dyspnea. AMADEO reviewed with patient. Pt verbalized understanding of all pre-operative instructions.    Anesthesia Alert  NO--Difficult Airway  NO--History of neck surgery or radiation  NO--Limited ROM of neck  NO--History of Malignant hyperthermia  NO--No personal or family history of Pseudocholinesterase deficiency.  NO--Prior Anesthesia Complication  NO--Latex Allergy  NO--Loose teeth  NO--History of Rheumatoid Arthritis  YES--AMADEO  NO--Bleeding Risk  NO--Other_____

## 2022-10-11 NOTE — H&P PST ADULT - NSICDXPASTMEDICALHX_GEN_ALL_CORE_FT
PAST MEDICAL HISTORY:  Arthritis     COPD (chronic obstructive pulmonary disease)     Depressed     Gastroesophageal reflux disease with esophagitis     Hypercholesteremia     Hypertension     Hypothyroid     AMADEO (obstructive sleep apnea)     Overactive bladder

## 2022-10-11 NOTE — H&P PST ADULT - MAMMOGRAM, RESULTS OF LAST, PROFILE
2nd attempt to schedule  Received: 2 weeks ago       ELIDA Candelaria Ob/Gyn Nurse Msg Pool                   We have made two attempts to schedule the patient for the Ultrasound that you ordered.   At this time we will be contacting the patient only once more to schedule over the phone. We will keep the order on file if you wish to have the patient call us to schedule. Our phone number is [phone].   We will also send the patient a post card with our phone number and hours if they decide to schedule they can call us at their convenience. If you feel the patient no longer needs this order, please advise the patient and cancel the order.                 WNL

## 2022-10-11 NOTE — H&P PST ADULT - NSICDXFAMILYHX_GEN_ALL_CORE_FT
FAMILY HISTORY:  Mother  Still living? No  CAD (coronary artery disease), Age at diagnosis: Age Unknown  Cancer, Age at diagnosis: Age Unknown  Family history of diabetes mellitus (DM), Age at diagnosis: Age Unknown

## 2022-10-11 NOTE — H&P PST ADULT - REASON FOR ADMISSION
62 yo female presents for PAST in preparation for medtronic interstim stage I with fluroscopy on 10/26/2022 under [facility] by Dr. Mitchell ([facility]).

## 2022-10-11 NOTE — H&P PST ADULT - NSICDXPASTSURGICALHX_GEN_ALL_CORE_FT
PAST SURGICAL HISTORY:  Admission for tubal ligation     H/O arthroscopy of left knee 20 yrs ago    H/O breast augmentation     H/O gastric bypass 2019    H/O hernia repair abdominal 07/2022    H/O plastic surgery abdominal plasty    H/O right inguinal hernia repair 2014    History of bladder surgery "with botox that failed"    History of umbilical hernia repair     S/P excision of lipoma

## 2022-10-12 LAB
APPEARANCE UR: CLEAR — SIGNIFICANT CHANGE UP
BILIRUB UR-MCNC: NEGATIVE — SIGNIFICANT CHANGE UP
COLOR SPEC: SIGNIFICANT CHANGE UP
CULTURE RESULTS: SIGNIFICANT CHANGE UP
DIFF PNL FLD: NEGATIVE — SIGNIFICANT CHANGE UP
GLUCOSE UR QL: NEGATIVE — SIGNIFICANT CHANGE UP
KETONES UR-MCNC: NEGATIVE — SIGNIFICANT CHANGE UP
LEUKOCYTE ESTERASE UR-ACNC: NEGATIVE — SIGNIFICANT CHANGE UP
NITRITE UR-MCNC: NEGATIVE — SIGNIFICANT CHANGE UP
PH UR: 7.5 — SIGNIFICANT CHANGE UP (ref 5–8)
PROT UR-MCNC: NEGATIVE — SIGNIFICANT CHANGE UP
SP GR SPEC: 1.01 — SIGNIFICANT CHANGE UP (ref 1.01–1.03)
SPECIMEN SOURCE: SIGNIFICANT CHANGE UP
UROBILINOGEN FLD QL: SIGNIFICANT CHANGE UP

## 2022-11-18 NOTE — HISTORY OF PRESENT ILLNESS
DISPLAY PLAN FREE TEXT DISPLAY PLAN FREE TEXT [FreeTextEntry1] : Patient is here for 6 weeks med check for urge incontinence.\par Last seen on 8/31/21 for PTNS #11. \par \par s/p Trospium 20 mg, no change\par s/p Oxybutynin ER 20 mg, side effects.\par s/p Myrbetriq 25 mg (nocturia)\par s/p Myrbetriq 50 mg daily: initially helped, but stopped helping\par s/p Vesicare 5mg: no change\par s/p Vesicare 10mg: using at night and it helps for the nighttime urination but not the daytime incontinence\par \par Underwent urodynamics: 3/17/21:\par Impression: decreased capacity (118cc), +D0I, +USUI\par Plan: third line treatments\par \par Toviaz 4mg: too expensive\par Vesicare 10mg\par \par Pt failed PTNS \par \par Today, patient states that Vesicare 10mg is not working at all for her. She is feeling even worse than before and c/o more leaking and frequency. She would like to schedule Botox injections at this time. Denies side effects. Patient does not feel she has an infection.\par \par  DISPLAY PLAN FREE TEXT DISPLAY PLAN FREE TEXT DISPLAY PLAN FREE TEXT DISPLAY PLAN FREE TEXT DISPLAY PLAN FREE TEXT DISPLAY PLAN FREE TEXT DISPLAY PLAN FREE TEXT DISPLAY PLAN FREE TEXT DISPLAY PLAN FREE TEXT DISPLAY PLAN FREE TEXT DISPLAY PLAN FREE TEXT DISPLAY PLAN FREE TEXT DISPLAY PLAN FREE TEXT DISPLAY PLAN FREE TEXT DISPLAY PLAN FREE TEXT DISPLAY PLAN FREE TEXT DISPLAY PLAN FREE TEXT DISPLAY PLAN FREE TEXT DISPLAY PLAN FREE TEXT DISPLAY PLAN FREE TEXT

## 2023-02-06 PROBLEM — M19.90 UNSPECIFIED OSTEOARTHRITIS, UNSPECIFIED SITE: Chronic | Status: ACTIVE | Noted: 2022-10-11

## 2023-02-06 PROBLEM — G47.33 OBSTRUCTIVE SLEEP APNEA (ADULT) (PEDIATRIC): Chronic | Status: ACTIVE | Noted: 2022-10-11

## 2023-02-06 PROBLEM — J44.9 CHRONIC OBSTRUCTIVE PULMONARY DISEASE, UNSPECIFIED: Chronic | Status: ACTIVE | Noted: 2022-10-11

## 2023-02-06 RX ORDER — SOLIFENACIN SUCCINATE 10 MG/1
10 TABLET ORAL
Qty: 30 | Refills: 0 | Status: DISCONTINUED | COMMUNITY
Start: 2021-03-19 | End: 2023-02-06

## 2023-02-06 RX ORDER — TROSPIUM CHLORIDE 20 MG/1
20 TABLET, FILM COATED ORAL
Qty: 60 | Refills: 0 | Status: DISCONTINUED | COMMUNITY
Start: 2022-04-18 | End: 2023-02-06

## 2023-03-27 ENCOUNTER — APPOINTMENT (OUTPATIENT)
Dept: UROGYNECOLOGY | Facility: CLINIC | Age: 63
End: 2023-03-27
Payer: MEDICARE

## 2023-03-27 VITALS
BODY MASS INDEX: 32.49 KG/M2 | SYSTOLIC BLOOD PRESSURE: 132 MMHG | WEIGHT: 195 LBS | HEART RATE: 67 BPM | HEIGHT: 65 IN | DIASTOLIC BLOOD PRESSURE: 78 MMHG

## 2023-03-27 PROCEDURE — 99214 OFFICE O/P EST MOD 30 MIN: CPT

## 2023-03-27 RX ORDER — LACTASE 9000 UNIT
9000 TABLET ORAL 3 TIMES DAILY
Qty: 30 | Refills: 3 | Status: COMPLETED | COMMUNITY
Start: 2022-06-10 | End: 2023-03-27

## 2023-03-27 RX ORDER — POLYETHYLENE GLYCOL 3350 AND ELECTROLYTES WITH LEMON FLAVOR 236; 22.74; 6.74; 5.86; 2.97 G/4L; G/4L; G/4L; G/4L; G/4L
236 POWDER, FOR SOLUTION ORAL
Qty: 1 | Refills: 0 | Status: COMPLETED | COMMUNITY
Start: 2022-06-10 | End: 2023-03-27

## 2023-03-27 NOTE — DISCUSSION/SUMMARY
[FreeTextEntry1] : \par Mixed Incontinence\par Discussed third line therapy, patient does not want interstim device at this time or other options. Discussed trying Gemtesa 75mg, precautions reviewed, will return for med check in 6-8 weeks or earlier if she has any issues. Rx adult diapers per patient request\par \par Patient will be trying out pure wick catheter, discussed cesar catheter placement, patient does not want something that she cannot remove by herself at this time

## 2023-03-27 NOTE — HISTORY OF PRESENT ILLNESS
[FreeTextEntry1] : Patient is here for 6 weeks med check for mixed incontinence.\par Last seen on 10/3/2022 for med check.\par \par s/p Trospium 20 mg, no change\par s/p Oxybutynin ER 20 mg, side effects.\par s/p Myrbetriq 25 mg (nocturia)\par s/p Myrbetriq 50 mg daily: initially helped, but stopped helping\par s/p Vesicare 5mg: no change\par s/p Vesicare 10mg: no change in symptoms\par s/p PTNS x 11 sessions- no change in symptoms\par \par Underwent urodynamics: 3/17/21:\par Impression: decreased capacity (118cc), +D0I, +USUI\par Plan: third line treatments\par \par Scheduled for interstim placement in October 2022 \par \par Trospium 20 mg BID\par Vesicare 10 mg in the morning\par \par Myrbetriq 50 mg and Trospium 20 mg BID\par \par Today, patient states she stopped both medications as they were not helping. She needs a prescription for adult diapers. She is interested in the pure wick catheter and purchasing it to try it out. She does not want interstim as she does not want a device inside of her. Patient does not feel she has an infection.\par \par \par

## 2023-03-27 NOTE — COUNSELING
[FreeTextEntry1] : If you feel like you have an infection it is important for you to call our office and we will arrange testing of your urine.\par \par Please begin taking Gemtesa 75 mg. It takes up to 6 weeks to go into full effect. Please  your refill when you complete the 1st bottle.\par \par Please call my office if you have any issues with the cost or side effects of the medication. \par \par Schedule a 6-8 week follow up med check appointment.\par

## 2023-04-01 NOTE — ED PROVIDER NOTE - HIV OFFER
Previously Declined (within the last year) MEDICATIONS  (STANDING):  atorvastatin 10 milliGRAM(s) Oral at bedtime  clonazePAM  Tablet 0.5 milliGRAM(s) Oral at bedtime  gabapentin 100 milliGRAM(s) Oral three times a day  hydrochlorothiazide 12.5 milliGRAM(s) Oral daily  losartan 100 milliGRAM(s) Oral daily  metoprolol tartrate 50 milliGRAM(s) Oral two times a day  mirtazapine 15 milliGRAM(s) Oral at bedtime  pantoprazole    Tablet 40 milliGRAM(s) Oral before breakfast  venlafaxine 112.5 milliGRAM(s) Oral daily    MEDICATIONS  (PRN):  aluminum hydroxide/magnesium hydroxide/simethicone Suspension 30 milliLiter(s) Oral every 4 hours PRN Dyspepsia  ondansetron   Disintegrating Tablet 8 milliGRAM(s) Oral every 8 hours PRN nausea

## 2023-04-20 ENCOUNTER — APPOINTMENT (OUTPATIENT)
Dept: SURGERY | Facility: CLINIC | Age: 63
End: 2023-04-20
Payer: MEDICARE

## 2023-04-20 VITALS — HEIGHT: 65 IN | BODY MASS INDEX: 33.32 KG/M2 | WEIGHT: 200 LBS

## 2023-04-20 DIAGNOSIS — S39.011A STRAIN OF MUSCLE, FASCIA AND TENDON OF ABDOMEN, INITIAL ENCOUNTER: ICD-10-CM

## 2023-04-20 DIAGNOSIS — E66.9 OBESITY, UNSPECIFIED: ICD-10-CM

## 2023-04-20 PROCEDURE — 99213 OFFICE O/P EST LOW 20 MIN: CPT

## 2023-04-20 NOTE — ASSESSMENT
[FreeTextEntry1] : Elvira presents back to the office nearly 9 months after the repair of her very large left inguinal hernia with mesh with concerns about intermittent discomfort in the left groin and mid abdomen which started approximately 3-4 weeks ago.\par \par Physical examination demonstrates a well-healed scar with no evidence of hernia recurrence or delayed wound complications in the left groin. Examination of her right groin demonstrates some mild weakness but no obvious hernia. Her umbilical examination is unremarkable. Examination of her generous abdominoplasty scar demonstrate no incisional hernias.  Of note, she has gained approximately 25 lbs since her surgery and she has now a noticeable pannus over her abdominoplasty incision where most of her pain is located. Her BMI is now 33.  \par \par Elvira was counseled and reassured. I believe she is experiencing mild abdominal wall muscle strain due to recent and sudden weight gain and we discussed modifications to her physical activity which may minimize her symptoms and prevent further injury in the future. She is now planning to return to her usual activity regimen in order to facilitate weight loss. She may return to me in the future if any issues arise, of course.

## 2023-04-20 NOTE — CONSULT LETTER
[Dear  ___] : Dear  [unfilled], [Courtesy Letter:] : I had the pleasure of seeing your patient, [unfilled], in my office today. [Please see my note below.] : Please see my note below. [Consult Closing:] : Thank you very much for allowing me to participate in the care of this patient.  If you have any questions, please do not hesitate to contact me. [FreeTextEntry3] : Sincerely,\par \par Amy Green PA-C, DANIEL\par

## 2023-05-09 ENCOUNTER — EMERGENCY (EMERGENCY)
Facility: HOSPITAL | Age: 63
LOS: 0 days | Discharge: HOME | End: 2023-05-10
Attending: EMERGENCY MEDICINE
Payer: MEDICARE

## 2023-05-09 DIAGNOSIS — Z98.84 BARIATRIC SURGERY STATUS: ICD-10-CM

## 2023-05-09 DIAGNOSIS — Z98.890 OTHER SPECIFIED POSTPROCEDURAL STATES: Chronic | ICD-10-CM
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
## 2023-05-09 DIAGNOSIS — Z23 ENCOUNTER FOR IMMUNIZATION: ICD-10-CM

## 2023-05-09 DIAGNOSIS — S40.812A ABRASION OF LEFT UPPER ARM, INITIAL ENCOUNTER: ICD-10-CM

## 2023-05-09 DIAGNOSIS — Z98.51 TUBAL LIGATION STATUS: ICD-10-CM

## 2023-05-09 DIAGNOSIS — F32.A DEPRESSION, UNSPECIFIED: ICD-10-CM

## 2023-05-09 DIAGNOSIS — Z30.2 ENCOUNTER FOR STERILIZATION: Chronic | ICD-10-CM
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
## 2023-05-09 DIAGNOSIS — S70.311A ABRASION, RIGHT THIGH, INITIAL ENCOUNTER: ICD-10-CM

## 2023-05-09 DIAGNOSIS — S40.811A ABRASION OF RIGHT UPPER ARM, INITIAL ENCOUNTER: ICD-10-CM

## 2023-05-09 DIAGNOSIS — Z98.84 BARIATRIC SURGERY STATUS: Chronic | ICD-10-CM

## 2023-05-09 DIAGNOSIS — M19.90 UNSPECIFIED OSTEOARTHRITIS, UNSPECIFIED SITE: ICD-10-CM

## 2023-05-09 DIAGNOSIS — Z87.19 PERSONAL HISTORY OF OTHER DISEASES OF THE DIGESTIVE SYSTEM: ICD-10-CM

## 2023-05-09 DIAGNOSIS — J44.9 CHRONIC OBSTRUCTIVE PULMONARY DISEASE, UNSPECIFIED: ICD-10-CM

## 2023-05-09 DIAGNOSIS — Z98.82 BREAST IMPLANT STATUS: Chronic | ICD-10-CM

## 2023-05-09 DIAGNOSIS — E78.00 PURE HYPERCHOLESTEROLEMIA, UNSPECIFIED: ICD-10-CM

## 2023-05-09 DIAGNOSIS — I10 ESSENTIAL (PRIMARY) HYPERTENSION: ICD-10-CM

## 2023-05-09 DIAGNOSIS — E03.9 HYPOTHYROIDISM, UNSPECIFIED: ICD-10-CM

## 2023-05-09 DIAGNOSIS — S70.312A ABRASION, LEFT THIGH, INITIAL ENCOUNTER: ICD-10-CM

## 2023-05-09 DIAGNOSIS — Z98.890 OTHER SPECIFIED POSTPROCEDURAL STATES: ICD-10-CM

## 2023-05-09 DIAGNOSIS — Z88.1 ALLERGY STATUS TO OTHER ANTIBIOTIC AGENTS STATUS: ICD-10-CM

## 2023-05-09 PROCEDURE — 90715 TDAP VACCINE 7 YRS/> IM: CPT

## 2023-05-09 PROCEDURE — 99283 EMERGENCY DEPT VISIT LOW MDM: CPT | Mod: 25

## 2023-05-09 PROCEDURE — 90471 IMMUNIZATION ADMIN: CPT

## 2023-05-09 PROCEDURE — 99284 EMERGENCY DEPT VISIT MOD MDM: CPT

## 2023-05-10 VITALS
HEART RATE: 69 BPM | RESPIRATION RATE: 18 BRPM | OXYGEN SATURATION: 99 % | SYSTOLIC BLOOD PRESSURE: 169 MMHG | WEIGHT: 195.11 LBS | TEMPERATURE: 99 F | DIASTOLIC BLOOD PRESSURE: 80 MMHG | HEIGHT: 65 IN

## 2023-05-10 RX ORDER — TETANUS TOXOID, REDUCED DIPHTHERIA TOXOID AND ACELLULAR PERTUSSIS VACCINE, ADSORBED 5; 2.5; 8; 8; 2.5 [IU]/.5ML; [IU]/.5ML; UG/.5ML; UG/.5ML; UG/.5ML
0.5 SUSPENSION INTRAMUSCULAR ONCE
Refills: 0 | Status: COMPLETED | OUTPATIENT
Start: 2023-05-10 | End: 2023-05-10

## 2023-05-10 RX ADMIN — Medication 1 TABLET(S): at 01:58

## 2023-05-10 RX ADMIN — TETANUS TOXOID, REDUCED DIPHTHERIA TOXOID AND ACELLULAR PERTUSSIS VACCINE, ADSORBED 0.5 MILLILITER(S): 5; 2.5; 8; 8; 2.5 SUSPENSION INTRAMUSCULAR at 01:58

## 2023-05-10 NOTE — ED PROVIDER NOTE - ATTENDING APP SHARED VISIT CONTRIBUTION OF CARE
61 yo F with PMH of HTN presents to the ER for cat scratches/bite to extremities after provoked attack when pt tried to  a  kitten from the litter in front of the Mother cat. Pt does not own this cat, as the cat lives in her friend's apartment building. Therefore, it appears Mother cat was not acting ill in any way, but was protecting her  litter. Pt has scratches to b/l arms, and to the b/l thighs with puncture-like wounds to the b/l thighs. No spreading erythema/swelling or dc. No other complaints. Pt is unsure of her tetanus status.      Agree with PA exam and management. Offered rabies vaccination but explained if cat can be observed and does not appear ill, rabies would not be necessary, which is what pt would prefer to do. To instruct on skin wound care, prescribe antibiotics and update tetanus vaccine today. Return precautions given.

## 2023-05-10 NOTE — ED PROVIDER NOTE - CLINICAL SUMMARY MEDICAL DECISION MAKING FREE TEXT BOX
63 yo F with PMH of HTN presents to the ER for cat scratches/bite to extremities after provoked attack when pt tried to  a  kitten from the litter in front of the Mother cat. Pt does not own this cat, as the cat lives in her friend's apartment building. Therefore, it appears Mother cat was not acting ill in any way, but was protecting her  litter. Pt has scratches to b/l arms, and to the b/l thighs with puncture-like wounds to the b/l thighs. No spreading erythema/swelling or dc. No other complaints. Pt is unsure of her tetanus status.      Agree with PA exam and management. Offered rabies vaccination but explained if cat can be observed and does not appear ill, rabies would not be necessary, which is what pt would prefer to do. To instruct on skin wound care, prescribe antibiotics and update tetanus vaccine today. Return precautions given.

## 2023-05-10 NOTE — ED PROVIDER NOTE - PROGRESS NOTE DETAILS
Had long discussion with patient regarding rabies post exposure prophylaxis, patient reports she and her friend will monitor the cat for the next 10 days for any sickness. if no signs of illness, patient will not require rabies PEP. patient is also advised to return to ED immediately if the cat becomes sick, the cat is missing and cannot be observed anymore.

## 2023-05-10 NOTE — ED PROVIDER NOTE - PHYSICAL EXAMINATION
CONSTITUTIONAL: Well-appearing; well-nourished; in no apparent distress.   EYES: PERRL; EOM intact.   MS: All extremities full range of motion's.  Neurovascular intact.  Ambulate normal and steady gait.  SKIN: Multiple linear superficial abrasions to bilateral forearms with no erythema, swelling or discharge.  Few superficial linear abrasions also noted to bilateral thighs.  Few small puncture-like wound also noted to bilateral thigh with no bleeding, redness or discharge.  Those wounds appear possible bite wound.  NEURO/PSYCH: A & O x 4; grossly unremarkable.

## 2023-05-10 NOTE — ED PROVIDER NOTE - PATIENT PORTAL LINK FT
You can access the FollowMyHealth Patient Portal offered by [facility] by registering at the following website: [email or web address]. By joining [employer]’s FollowMyHealth portal, you will also be able to view your health information using other applications (apps) compatible with our system.

## 2023-05-10 NOTE — ED PROVIDER NOTE - OBJECTIVE STATEMENT
62 years old female history of hypertension present complaining cat scratches abdomen possibly bites to upper and lower extremities.  As per patient, she was trying to  a  kitten from the nest.  The mother Cat showed up and started at taking her.  Reports scratches to bilateral arms, thigh and possibly bite to her thigh.  Does not recall her last tetanus.  Patient reports the cat is living in her friend's apartment building and the cat just gave birth to the kittens.  The cat has not been outside of the building for at least few weeks.  But she is not sure of the cats vaccination status.  The apartment building super usually take care and feed the cats.  The cat does not appear to be sick.  Patient otherwise denies continued bleeding, pus-like discharge, worsening pain or wetness to all the wounds. 62 years old female history of hypertension present complaining cat scratches and possibly bites to upper and lower extremities.  As per patient, she was trying to  a  kitten from the litter.  The mother Cat showed up and started attacking her.  Reports scratches to bilateral arms, thigh and possibly bite to her thigh.  Does not recall her last tetanus.  Patient reports the cat is living in her friend's apartment building and the cat just gave birth to the kittens.  The cat has not been outside of the building for at least few weeks.  But she is not sure of the cats vaccination status.  The apartment building super usually takes care of/feed the cats.  The cat does not appear to be sick.  Patient otherwise denies continued bleeding, pus-like discharge, worsening pain or wetness to all the wounds.

## 2023-05-14 ENCOUNTER — EMERGENCY (EMERGENCY)
Facility: HOSPITAL | Age: 63
LOS: 0 days | Discharge: HOME | End: 2023-05-15
Attending: EMERGENCY MEDICINE
Payer: MEDICARE

## 2023-05-14 VITALS
HEIGHT: 65 IN | TEMPERATURE: 98 F | RESPIRATION RATE: 18 BRPM | HEART RATE: 81 BPM | OXYGEN SATURATION: 98 % | SYSTOLIC BLOOD PRESSURE: 183 MMHG | DIASTOLIC BLOOD PRESSURE: 99 MMHG | WEIGHT: 195.99 LBS

## 2023-05-14 DIAGNOSIS — Z98.890 OTHER SPECIFIED POSTPROCEDURAL STATES: Chronic | ICD-10-CM

## 2023-05-14 DIAGNOSIS — R07.9 CHEST PAIN, UNSPECIFIED: ICD-10-CM
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
## 2023-05-14 DIAGNOSIS — M79.641 PAIN IN RIGHT HAND: ICD-10-CM

## 2023-05-14 DIAGNOSIS — E03.9 HYPOTHYROIDISM, UNSPECIFIED: ICD-10-CM

## 2023-05-14 DIAGNOSIS — Z98.84 BARIATRIC SURGERY STATUS: Chronic | ICD-10-CM

## 2023-05-14 DIAGNOSIS — I10 ESSENTIAL (PRIMARY) HYPERTENSION: ICD-10-CM

## 2023-05-14 DIAGNOSIS — Z98.82 BREAST IMPLANT STATUS: Chronic | ICD-10-CM

## 2023-05-14 DIAGNOSIS — Z88.1 ALLERGY STATUS TO OTHER ANTIBIOTIC AGENTS STATUS: ICD-10-CM

## 2023-05-14 DIAGNOSIS — M54.9 DORSALGIA, UNSPECIFIED: ICD-10-CM

## 2023-05-14 DIAGNOSIS — Z30.2 ENCOUNTER FOR STERILIZATION: Chronic | ICD-10-CM
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
## 2023-05-14 PROCEDURE — 71046 X-RAY EXAM CHEST 2 VIEWS: CPT | Mod: 26

## 2023-05-14 PROCEDURE — 73130 X-RAY EXAM OF HAND: CPT | Mod: 26,RT

## 2023-05-14 PROCEDURE — 73130 X-RAY EXAM OF HAND: CPT | Mod: RT

## 2023-05-14 PROCEDURE — 99284 EMERGENCY DEPT VISIT MOD MDM: CPT | Mod: 25

## 2023-05-14 PROCEDURE — 99284 EMERGENCY DEPT VISIT MOD MDM: CPT

## 2023-05-14 PROCEDURE — 71046 X-RAY EXAM CHEST 2 VIEWS: CPT

## 2023-05-14 PROCEDURE — 96372 THER/PROPH/DIAG INJ SC/IM: CPT

## 2023-05-14 RX ORDER — KETOROLAC TROMETHAMINE 30 MG/ML
30 SYRINGE (ML) INJECTION ONCE
Refills: 0 | Status: DISCONTINUED | OUTPATIENT
Start: 2023-05-14 | End: 2023-05-14

## 2023-05-14 RX ADMIN — Medication 30 MILLIGRAM(S): at 23:49

## 2023-05-14 NOTE — ED ADULT TRIAGE NOTE - CHIEF COMPLAINT QUOTE
Patient complaining of mechanical fall after tripping on concrete barrier in parking lot yesterday. Patient complaining of back pain and R hand pain.

## 2023-05-15 NOTE — ED PROVIDER NOTE - PATIENT PORTAL LINK FT
You can access the FollowMyHealth Patient Portal offered by [facility] by registering at the following website: [email or web address]. By joining [employer]’s FollowMyHealth portal, you will also be able to view your health information using other applications (apps) compatible with our system.

## 2023-05-15 NOTE — ED PROVIDER NOTE - OBJECTIVE STATEMENT
62 yold female to Ed [facility] Htn, Hypothyroidism c/o fall - accidentally tripped over concrete parking barrier; pt c/o diffuse body aches - chest, back and right hand s/p fall; pt denies hitting head, loc, n/v, neck or abdominal pain; pt ambulatory post fall; pt denies use of blood thinners;

## 2023-05-15 NOTE — ED PROVIDER NOTE - NSFOLLOWUPINSTRUCTIONS_ED_ALL_ED_FT
Fall Prevention in the Home    Falls can cause injuries. They can happen to people of all ages. There are many things you can do to make your home safe and to help prevent falls.     WHAT CAN I DO ON THE OUTSIDE OF MY HOME?  Regularly fix the edges of walkways and driveways and fix any cracks.  Remove anything that might make you trip as you walk through a door, such as a raised step or threshold.  Trim any bushes or trees on the path to your home.  Use bright outdoor lighting.  Clear any walking paths of anything that might make someone trip, such as rocks or tools.  Regularly check to see if handrails are loose or broken. Make sure that both sides of any steps have handrails.  Any raised decks and porches should have guardrails on the edges.  Have any leaves, snow, or ice cleared regularly.  Use sand or salt on walking paths during winter.  Clean up any spills in your garage right away. This includes oil or grease spills.    WHAT CAN I DO IN THE BATHROOM?  Use night lights.  Install grab bars by the toilet and in the tub and shower. Do not use towel bars as grab bars.  Use non-skid mats or decals in the tub or shower.  If you need to sit down in the shower, use a plastic, non-slip stool.  Keep the floor dry. Clean up any water that spills on the floor as soon as it happens.  Remove soap buildup in the tub or shower regularly.  Attach bath mats securely with double-sided non-slip rug tape.  Do not have throw rugs and other things on the floor that can make you trip.    WHAT CAN I DO IN THE BEDROOM?  Use night lights.  Make sure that you have a light by your bed that is easy to reach.  Do not use any sheets or blankets that are too big for your bed. They should not hang down onto the floor.  Have a firm chair that has side arms. You can use this for support while you get dressed.  Do not have throw rugs and other things on the floor that can make you trip.    WHAT CAN I DO IN THE KITCHEN?  Clean up any spills right away.  Avoid walking on wet floors.  Keep items that you use a lot in easy-to-reach places.  If you need to reach something above you, use a strong step stool that has a grab bar.  Keep electrical cords out of the way.  Do not use floor polish or wax that makes floors slippery. If you must use wax, use non-skid floor wax.  Do not have throw rugs and other things on the floor that can make you trip.    WHAT CAN I DO WITH MY STAIRS?  Do not leave any items on the stairs.  Make sure that there are handrails on both sides of the stairs and use them. Fix handrails that are broken or loose. Make sure that handrails are as long as the stairways.  Check any carpeting to make sure that it is firmly attached to the stairs. Fix any carpet that is loose or worn.  Avoid having throw rugs at the top or bottom of the stairs. If you do have throw rugs, attach them to the floor with carpet tape.  Make sure that you have a light switch at the top of the stairs and the bottom of the stairs. If you do not have them, ask someone to add them for you.    WHAT ELSE CAN I DO TO HELP PREVENT FALLS?  Wear shoes that:  Do not have high heels.  Have rubber bottoms.  Are comfortable and fit you well.  Are closed at the toe. Do not wear sandals.  If you use a stepladder:  Make sure that it is fully opened. Do not climb a closed stepladder.  Make sure that both sides of the stepladder are locked into place.  Ask someone to hold it for you, if possible.  Clearly remi and make sure that you can see:  Any grab bars or handrails.  First and last steps.  Where the edge of each step is.  Use tools that help you move around (mobility aids) if they are needed. These include:  Canes.  Walkers.  Scooters.  Crutches.  Turn on the lights when you go into a dark area. Replace any light bulbs as soon as they burn out.  Set up your furniture so you have a clear path. Avoid moving your furniture around.  If any of your floors are uneven, fix them.  If there are any pets around you, be aware of where they are.  Review your medicines with your doctor. Some medicines can make you feel dizzy. This can increase your chance of falling.    Ask your doctor what other things that you can do to help prevent falls.    ADDITIONAL NOTES AND INSTRUCTIONS    Please follow up with your Primary MD in 24-48 hr.  Seek immediate medical care for any new/worsening signs or symptoms.         Contusion  A contusion is a deep bruise. Contusions are the result of a blunt injury to tissues and muscle fibers under the skin. The injury causes bleeding under the skin. The skin overlying the contusion may turn blue, purple, or yellow. Minor injuries will give you a painless contusion, but more severe contusions may stay painful and swollen for a few weeks.    What are the causes?  This condition is usually caused by a blow, trauma, or direct force to an area of the body.    What are the signs or symptoms?  Symptoms of this condition include:    Swelling of the injured area.  Pain and tenderness in the injured area.  Discoloration. The area may have redness and then turn blue, purple, or yellow.    How is this diagnosed?  This condition is diagnosed based on a physical exam and medical history. An X-ray, CT scan, or MRI may be needed to determine if there are any associated injuries, such as broken bones (fractures).    How is this treated?  Specific treatment for this condition depends on what area of the body was injured. In general, the best treatment for a contusion is resting, icing, applying pressure to (compression), and elevating the injured area. This is often called the RICE strategy. Over-the-counter anti-inflammatory medicines may also be recommended for pain control.    Follow these instructions at home:  Rest the injured area.  If directed, apply ice to the injured area:    Put ice in a plastic bag.  Place a towel between your skin and the bag.  Leave the ice on for 20 minutes, 2–3 times per day.    If directed, apply light compression to the injured area using an elastic bandage. Make sure the bandage is not wrapped too tightly. Remove and reapply the bandage as directed by your health care provider.  If possible, raise (elevate) the injured area above the level of your heart while you are sitting or lying down.  Take over-the-counter and prescription medicines only as told by your health care provider.  Contact a health care provider if:  Your symptoms do not improve after several days of treatment.  Your symptoms get worse.  You have difficulty moving the injured area.  Get help right away if:  You have severe pain.  You have numbness in a hand or foot.  Your hand or foot turns pale or cold.  This information is not intended to replace advice given to you by your health care provider. Make sure you discuss any questions you have with your health care provider.

## 2023-05-15 NOTE — ED PROVIDER NOTE - CARE PLAN
1 Principal Discharge DX:	Fall  Secondary Diagnosis:	Back pain  Secondary Diagnosis:	Hand pain, right

## 2023-05-15 NOTE — ED PROVIDER NOTE - CLINICAL SUMMARY MEDICAL DECISION MAKING FREE TEXT BOX
No distress.  VSS.  Imaging negative.  Normal gait.  DC home.  Strict return instructions discussed.

## 2023-05-15 NOTE — ED PROVIDER NOTE - CARE PROVIDER_API CALL
Ricco Elmore  Internal Medicine  [address] [place] [id number]  Phone: [phone]  Fax: [phone]  Established Patient  Follow Up Time: 4-6 Days

## 2023-05-15 NOTE — ED PROVIDER NOTE - PHYSICAL EXAMINATION
Constitutional: Well developed, well nourished. NAD  Head: Normocephalic, atraumatic.  Eyes: PERRL, EOMI.  ENT: No nasal discharge. Mucous membranes dry.  Neck: Supple. Painless ROM.  Cardiovascular:  Regular rate and rhythm.    Pulmonary:  Lungs clear to auscultation bilaterally.    Abdominal: Soft. Nondistended. No rebound, guarding, rigidity.  Extremities. Pelvis stable. right hand - mild tenderness to volar wrist; flex/ext/pronation/supination intact;l   Skin: No rashes, cyanosis.  Neuro: AAOx3. No focal neurological deficits.  Psych: Normal mood. Normal affect.

## 2023-05-16 NOTE — DISCHARGE NOTE NURSING/CASE MANAGEMENT/SOCIAL WORK - HAVE YOU HAD A FIRST COVID-19 BOOSTER?
[facility] Hospitalist Progress Note  Subjective   Patient was seen and examined this morning.  Patient is alert and oriented x1 to self.  Pt denies any complains  Objective     I/O's  No intake or output data in the 24 hours ending 05/16/23 1239    Last Recorded Vitals  Vitals with min/max:    -  Vital Last Value 24 Hour Range   Temperature 98.1 °F (36.7 °C) (05/16/23 1123) Temp  Min: 97.8 °F (36.6 °C)  Max: 99 °F (37.2 °C)   Pulse 79 (05/16/23 1123) Pulse  Min: 52  Max: 108   Respiratory 18 (05/16/23 0308) Resp  Min: 15  Max: 27   Non-Invasive  Blood Pressure (!) 148/89 (05/16/23 1123) BP  Min: 138/78  Max: 203/98   Pulse Oximetry 98 % (05/16/23 1123) SpO2  Min: 96 %  Max: 100 %   Arterial   Blood Pressure   No data recorded        All labs and tests on this notes have been reviewed and analyzed and taken into consideration for taking care of the patient      Body mass index is 29.02 kg/m².  GENERAL:  In no apparent distress, AAox 1 to self  CHEST:  Chest with clear breath sounds bilaterally.   CARDIAC:  Regular rate and rhythm.  S1 and S2  VASCULAR:  No Edema.  Peripheral pulses normal and equal in all extremities  ABDOMEN:  Soft, without detectable tenderness.  No sign of distention.  No   rebound or guarding, and no masses palpated.  MUSCULOSKELETAL: good range of motion of all major joints. no calf tenderness      Labs   Recent Labs   Lab 05/16/23  0453   WBC 4.7   RBC 4.89   HGB 14.1   HCT 43.6        Recent Labs   Lab 05/16/23  0453 05/15/23  1430   SODIUM 140 139   POTASSIUM 4.1 4.1   CHLORIDE 104 104   CO2 31 29   BUN 11 13   CREATININE 1.22* 1.22*   GLUCOSE 95 100*   CALCIUM 9.6 9.8       Current Facility-Administered Medications   Medication   • sodium chloride 0.9 % flush bag 25 mL   • sodium chloride (PF) 0.9 % injection 2 mL   • melatonin tablet 6 mg   • hydrALAZINE (APRESOLINE) injection 10 mg   • memantine (NAMENDA) tablet 10 mg   • amLODIPine (NORVASC) tablet 5 mg   • acetaminophen (TYLENOL) tablet  650 mg    Or   • acetaminophen (TYLENOL) suppository 650 mg   • atorvastatin (LIPITOR) tablet 20 mg   • famotidine (PEPCID) injection 20 mg   • ondansetron (ZOFRAN ODT) disintegrating tablet 4 mg    Or   • ondansetron (ZOFRAN) injection 4 mg   • polyethylene glycol (MIRALAX) packet 17 g   • Magnesium Standard Replacement Protocol   • HYDROcodone-acetaminophen (NORCO) 5-325 MG per tablet 1 tablet   • docusate sodium-sennosides (SENOKOT S) 50-8.6 MG 2 tablet   • aluminum-magnesium hydroxide-simethicone (MAALOX) 200-200-20 MG/5ML suspension 20 mL   • sodium chloride 0.9 % flush bag 25 mL   • heparin (porcine) injection 5,000 Units   • Potassium Standard Replacement Protocol (Levels 3.5 and lower)       Imaging    US VASC EXTREMITY LOWER VENOUS DUPLEX   Final Result by Cuauhtemoc Cornell MD (05/15 6817)      No evidence of deep venous thrombosis in the lower extremities.      If symptoms suggesting DVT persist, a follow-up ultrasound study is   recommended the next day.  If this, too, is negative and symptoms persist,   a final ultrasound is recommended at one week.  Two negative exams one week   apart is considered sufficient to exclude DVT. (The rationale for the   repeat exams is that an undetectable calf vein thrombus may propagate   superiorly and become a threat for clinically significant pulmonary   embolism, but will be sonographically detectable once it reaches the   popliteal vein.)         FOR PHYSICIAN USE ONLY - Please note that this report was generated using   voice recognition software.  If you require clarification or feel that   there has been an error in this report please contact me through [employer].  Thank you very much for allowing me to participate in the care of   your patient.      Electronically Signed by: CUAUHTEMOC CORNELL    Signed on: 5/15/2023 4:08 PM    Workstation ID: [id number]      XR CHEST PA OR AP 1 VIEW   Final Result by Cuauhtemoc Cornell MD (05/15 7620)      No acute  cardiopulmonary findings.         Electronically Signed by: CUAUHTEMOC CORNELL    Signed on: 5/15/2023 3:44 PM    Workstation ID: [id number]      CT HEAD WO CONTRAST   Final Result by Marco Kwong MD (05/15 8655)      1.  Mild chronic periventricular white matter ischemic change.   2.  Volume loss.   3.  Mucosal thickening ethmoid air cells.      No evidence of intracranial bleed or mass effect and no significant   interval change when compared to the previous examination.  Dr. Eloise Juarez notified 05/15/2023 at 2:40 PM.      Electronically Signed by: MARCO KWONG M.D.    Signed on: 5/15/2023 2:45 PM    Workstation ID: [id number]      MRI BRAIN WO CONTRAST    (Results Pending)   MRA HEAD WO CONTRAST    (Results Pending)   MRA NECK WO CONTRAST    (Results Pending)         Assessment & Plan     Patient is a 70-year-old male with a past medical history significant for dementia with Lewy body who was brought to the ED by EMS with complaint of left facial droop and altered mental status.     Acute encephalopathy with left facial droop   DDx for altered mental status include worsening dementia, hypertensive encephalopathy or infection induced metabolic encephalopathy.    CT head on admission with no evidence of acute abnormalities.    MRI brain, MRA H/N  pending.   TSH wnl and ammonia level 53.    Continue management with aspirin and atorvastatin.    Neurology and PT/OT/ST on consult.       Poorly controlled hypertension with hypertensive urgency  BP elevated on admission as high as 203/98.  Patient with no documented history of hypertension and currently not on antihypertensive medications.  Partner states that his home blood pressure measurements have been elevated as high as systolic of 190.    Will start management with amlodipine.  Continue to monitor BP and allow for permissive hypertension pending CVA work-up.    Outpatient follow-up with PCP upon discharge for reevaluation and titration of  antihypertensive regimen.     Chronic advanced dementia with Lewy body  Patient was brought to the ED with reported altered mental status concerning for acute CVA.  Known history of chronic dementia with Lewy body and behavioral disturbances.    Resume home medical management including memantine and trazodone.  Psych consulted for agitation     CKD stage II  Creatinine 1.22 on admission with no recent baseline creatinine available for comparison.    Remote baseline creatinine documented at 1.01.    Continue to monitor kidney function avoid nephrotoxins including NSAIDs.    DVT Prophylaxis  SCD. Heparin SQ    Code Status  Full Code       IP Consult Orders (From admission, onward)     Start     Ordered    05/15/23 1641  Inpatient consult to Neurology  ONE TIME        Provider:  Obinna Candelario MD    05/15/23 1643                  Cailin Catalan MD  [facility]ist   Please page through [employer]   [phone]   No

## 2023-06-01 NOTE — H&P PST ADULT - MEDICATION ADMINISTRATION INFO, PROFILE

## 2023-06-23 NOTE — ASU PATIENT PROFILE, ADULT - CAREGIVER
PROCEDURE DATE:  23    PATIENT:  Name: Jaclyn Rodriguez 35 year old female  : 1987  MRN: [id number]    LOCATION:  [facility]     SURGEON:  Denise Riggs M.D.    ASSISTANT:  Surgical assist    Assistant No

## 2023-07-03 ENCOUNTER — APPOINTMENT (OUTPATIENT)
Dept: UROGYNECOLOGY | Facility: CLINIC | Age: 63
End: 2023-07-03

## 2023-07-05 ENCOUNTER — APPOINTMENT (OUTPATIENT)
Dept: NEUROLOGY | Facility: CLINIC | Age: 63
End: 2023-07-05
Payer: MEDICARE

## 2023-07-05 VITALS
WEIGHT: 191 LBS | SYSTOLIC BLOOD PRESSURE: 141 MMHG | DIASTOLIC BLOOD PRESSURE: 85 MMHG | HEART RATE: 60 BPM | BODY MASS INDEX: 31.82 KG/M2 | HEIGHT: 65 IN

## 2023-07-05 PROCEDURE — 99203 OFFICE O/P NEW LOW 30 MIN: CPT

## 2023-07-05 NOTE — ASSESSMENT
[FreeTextEntry1] : Impression is that of ataxia, probably of cerebellar origin. I ordered an MRI of the brain to evaluate for intracranial pathology. We will see patient back in follow up in 1 month for reevaluation. \par \par Total clinician time spent today on the patient is 35 minutes including preparing to see the patient, obtaining and/or reviewing and confirming history, performing medically necessary and appropriate examination, counseling and educating the patient and/or family, documenting clinical information in the EHR and communicating and/or referring to other healthcare professionals.\par \par Entered by Julia Rogers acting as scribe for Dr. Hernandez.\par \par \par The documentation recorded by the scribe, in my presence, accurately reflects the service I personally performed, and the decisions made by me with my edits as appropriate. \par Souleymane Hernandez MD, FAAN, FACP\par Diplomate American Board of Psychiatry & Neurology\par \par

## 2023-07-05 NOTE — PHYSICAL EXAM
[FreeTextEntry1] : PHYSICAL EXAMINATION:\par Head: Normocephalic, atraumatic. Negative TA tenderness/prominence. \par \par Neck: Supple with full range of motion; nontender with negative bilateral Spurling's signs. \par \par Spine: Full range of motion; nontender. Negative straight leg raise maneuvers. \par \par Extremities: Non-tender. Atraumatic. Negative Tinel's signs. \par \par NEUROLOGICAL EXAMINATION: \par \par Mental Status: Patient is a good informant with intact orientation, attention, concentration, recent and remote memory. Language evaluation reveals no evidence of aphasia. Fund of knowledge is normal. \par \par Cranial Nerves Cranial Nerves: \par \par II, III, IV, VI: Pupils are equal, round, and reactive to light and accommodation. No evidence of afferent pupillary defect. Visual fields are full to confrontation. Eye movements are full without evidence of nystagmus or internuclear ophthalmoplegia. Funduscopic examination reveals sharp disc margins. \par \par V: Normal jaw movements. Normal facial sensation. \par \par VII: Normal facial motor testing. \par \par VIII: Grossly normal hearing bilaterally. \par \par IX, X: Palate moves symmetrically. No dysarthria. \par \par XI: Normal shoulder shrug and sternocleidomastoid power. \par \par XII: Tongue appears normal and protrudes in the midline. \par \par Motor: Normal bulk, tone, and power throughout. \par \par Muscle Stretch Reflexes (right/left): 2+ symmetrical. \par \par Plantar Responses: Flexor bilaterally. \par \par Coordination: Normal finger to nose and heel to shin testing, no truncal ataxia and no tremor. \par \par Sensation: Normal primary sensation. Normal double simultaneous stimulation. \par \par Gait and Station: Positive Romberg, she will fall if she does not hold on. Unsteady tandem, she will fall if not supported.\par
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
## 2023-07-17 ENCOUNTER — APPOINTMENT (OUTPATIENT)
Dept: MRI IMAGING | Facility: CLINIC | Age: 63
End: 2023-07-17
Payer: MEDICARE

## 2023-07-17 PROCEDURE — 70551 MRI BRAIN STEM W/O DYE: CPT

## 2023-07-25 ENCOUNTER — APPOINTMENT (OUTPATIENT)
Dept: NEUROLOGY | Facility: CLINIC | Age: 63
End: 2023-07-25
Payer: MEDICARE

## 2023-07-25 VITALS — WEIGHT: 195 LBS | BODY MASS INDEX: 32.49 KG/M2 | HEIGHT: 65 IN

## 2023-07-25 DIAGNOSIS — R27.0 ATAXIA, UNSPECIFIED: ICD-10-CM

## 2023-07-25 PROCEDURE — 99213 OFFICE O/P EST LOW 20 MIN: CPT

## 2023-07-26 ENCOUNTER — APPOINTMENT (OUTPATIENT)
Dept: NEUROLOGY | Facility: CLINIC | Age: 63
End: 2023-07-26

## 2023-07-26 NOTE — ASSESSMENT
[FreeTextEntry1] : 62 year old female with ataxia and poor coordination. Today we reviewed her MRI of the brain. She was provided a script to begin physical therapy for gait training, muscle strengthening and fall prevention and will return to the office in 3 months to discuss her progress. \par \par I have personally reviewed with the PA, this patient’s history and physical exam findings, as documented above. I have discussed the relevant areas of concern, having direct implications to the presenting problems and illnesses, and have personally examined all pertinent findings which impact on the prior neurological treatment. \par \par Jelena Asher MS, PA-C\par Souleymane Hernandez MD \par  \par

## 2023-07-26 NOTE — PHYSICAL EXAM
[FreeTextEntry1] : PHYSICAL EXAMINATION:\par Head: Normocephalic, atraumatic. Negative TA tenderness/prominence. \par \par Neck: Supple with full range of motion; nontender with negative bilateral Spurling's signs. \par \par Spine: Full range of motion; nontender. Negative straight leg raise maneuvers. \par \par Extremities: Non-tender. Atraumatic. Negative Tinel's signs. \par \par NEUROLOGICAL EXAMINATION: \par \par Mental Status: Patient is a good informant with intact orientation, attention, concentration, recent and remote memory. Language evaluation reveals no evidence of aphasia. Fund of knowledge is normal. \par \par Cranial Nerves Cranial Nerves: \par \par II, III, IV, VI: Pupils are equal, round, and reactive to light and accommodation. No evidence of afferent pupillary defect. Visual fields are full to confrontation. Eye movements are full without evidence of nystagmus or internuclear ophthalmoplegia. Funduscopic examination reveals sharp disc margins. \par \par V: Normal jaw movements. Normal facial sensation. \par \par VII: Normal facial motor testing. \par \par VIII: Grossly normal hearing bilaterally. \par \par IX, X: Palate moves symmetrically. No dysarthria. \par \par XI: Normal shoulder shrug and sternocleidomastoid power. \par \par XII: Tongue appears normal and protrudes in the midline. \par \par Motor: Normal bulk, tone, and power throughout. \par \par Muscle Stretch Reflexes (right/left): 2+ symmetrical. \par \par Plantar Responses: Flexor bilaterally. \par \par Coordination: Normal finger to nose and heel to shin testing, no truncal ataxia and no tremor. \par \par Sensation: Normal primary sensation. Normal double simultaneous stimulation. \par \par Gait and Station: Positive Romberg, she will fall if she does not hold on. Unsteady tandem, she will fall if not supported.\par  [General Appearance - Alert] : alert [General Appearance - In No Acute Distress] : in no acute distress [General Appearance - Well-Appearing] : healthy appearing [] : normal voice and communication [Oriented To Time, Place, And Person] : oriented to person, place, and time [Impaired Insight] : insight and judgment were intact [Affect] : the affect was normal [Person] : oriented to person [Place] : oriented to place [Time] : oriented to time [Concentration Intact] : normal concentrating ability [Visual Intact] : visual attention was ~T not ~L decreased [Naming Objects] : no difficulty naming common objects [Repeating Phrases] : no difficulty repeating a phrase [Writing A Sentence] : no difficulty writing a sentence [Fluency] : fluency intact [Comprehension] : comprehension intact [Reading] : reading intact [Past History] : adequate knowledge of personal past history [Cranial Nerves Optic (II)] : visual acuity intact bilaterally,  visual fields full to confrontation, pupils equal round and reactive to light [Cranial Nerves Oculomotor (III)] : extraocular motion intact [Cranial Nerves Trigeminal (V)] : facial sensation intact symmetrically [Cranial Nerves Facial (VII)] : face symmetrical [Cranial Nerves Vestibulocochlear (VIII)] : hearing was intact bilaterally [Cranial Nerves Glossopharyngeal (IX)] : tongue and palate midline [Cranial Nerves Accessory (XI - Cranial And Spinal)] : head turning and shoulder shrug symmetric [Cranial Nerves Hypoglossal (XII)] : there was no tongue deviation with protrusion [Motor Strength] : muscle strength was normal in all four extremities [No Muscle Atrophy] : normal bulk in all four extremities [Motor Strength Upper Extremities Bilaterally] : strength was normal in both upper extremities [Motor Strength Lower Extremities Bilaterally] : strength was normal in both lower extremities [Sensation Tactile Decrease] : light touch was intact [Abnormal Walk] : normal gait [Past-pointing] : there was no past-pointing [Tremor] : no tremor present [2+] : Ankle jerk left 2+ [Plantar Reflex Right Only] : normal on the right [Plantar Reflex Left Only] : normal on the left [FreeTextEntry8] : poor balance  [PERRL With Normal Accommodation] : pupils were equal in size, round, reactive to light, with normal accommodation [Extraocular Movements] : extraocular movements were intact [Hearing Threshold Finger Rub Not Jenkins] : hearing was normal [Neck Appearance] : the appearance of the neck was normal [Involuntary Movements] : no involuntary movements were seen [Motor Tone] : muscle strength and tone were normal
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
## 2023-07-31 ENCOUNTER — APPOINTMENT (OUTPATIENT)
Dept: UROGYNECOLOGY | Facility: CLINIC | Age: 63
End: 2023-07-31
Payer: MEDICARE

## 2023-07-31 VITALS
HEIGHT: 65 IN | WEIGHT: 195 LBS | HEART RATE: 56 BPM | BODY MASS INDEX: 32.49 KG/M2 | DIASTOLIC BLOOD PRESSURE: 77 MMHG | SYSTOLIC BLOOD PRESSURE: 129 MMHG

## 2023-07-31 PROCEDURE — 99214 OFFICE O/P EST MOD 30 MIN: CPT

## 2023-07-31 RX ORDER — MIRABEGRON 50 MG/1
50 TABLET, FILM COATED, EXTENDED RELEASE ORAL
Qty: 30 | Refills: 1 | Status: DISCONTINUED | COMMUNITY
Start: 2023-02-06 | End: 2023-07-31

## 2023-07-31 NOTE — HISTORY OF PRESENT ILLNESS
[FreeTextEntry1] : Patient is here for 4 month med check for mixed incontinence Last seen on 3/27/2023 for med check.  s/p Trospium 20 mg, no change  s/p Oxybutynin ER 20 mg, side effects.  s/p Myrbetriq 25 mg (nocturia)  s/p Myrbetriq 50 mg daily: initially helped, but stopped helping  s/p Vesicare 5mg: no change  s/p Vesicare 10mg: no change in symptoms  s/p PTNS x 11 sessions- no change in symptoms   Underwent urodynamics: 3/17/21:  Impression: decreased capacity (118cc), +D0I, +USUI  Plan: third line treatments  Scheduled for interstim placement in October 2022-patient then changed her mind about Interstim.    Trospium 20 mg BID and Vesicare 10 mg in the morning  Myrbetriq 50 mg and Trospium 20 mg BID-stopped as it stopped helping her symptoms.  Gemtesa 75 mg  Today, patient states she originally saw some improvement with Gemtesa but then it stopped helping her frequency as much. She would like to increase the dosage. Notes that the nighttime urgency and frequency is most bothersome to her. So far, the purewick catheter has helped her symptoms the most, however it is not covered by her insurance and very expensive. Denies UTI symptoms.

## 2023-07-31 NOTE — DISCUSSION/SUMMARY
[FreeTextEntry1] : Mixed Incontinence: Discussed the options including third line therapy options-patient may consider Interstim in the future. Discussed Mckeon placement-patient does not want at this time. Discussed self cathing, patient does not want at this time. Discussed pure wick catheter, patient will contact insurance as this helped her nocturia the most but is not covered by insurance and too expensive. If it is covered, advised to contact the office for a rx.  Discussed trying alternative medication/adding another medication, patient would like to continue Gemtesa and add a medication. Risks/benefits of Vesicare discussed, patient would like to try Gemtesa and Vesicare 10 mg. She will also continue Biotene mouthwash to help with the side effects.  Precautions reviewed. Will return for follow up in 6-8 weeks for med check/PVR check or earlier if she has any issues. She will think about Interstim and schedule surgical counseling visit if she wants to go ahead with it.

## 2023-07-31 NOTE — REASON FOR VISIT
[TextEntry] : Reason for visit: follow up Voids per day:   10-12 Voids per night:   6 Urge incontinence Yes  Stress incontinence: Yes Constipation: No Fecal incontinence: No   Vaginal bulge: No

## 2023-08-21 ENCOUNTER — RX RENEWAL (OUTPATIENT)
Age: 63
End: 2023-08-21

## 2023-09-10 NOTE — ED PROVIDER NOTE - TOBACCO USE
Monitor room called to state that patient hr was 130,s nurse entered to find patient standing at bedside making bed and putting on his clothes.  Nurse directed patient back to bed and explained that he was not to get up unassisted. Family member at bedside. Nurse placed bed alarm.   Patient somewhat upset.   Never smoker

## 2023-09-11 ENCOUNTER — APPOINTMENT (OUTPATIENT)
Dept: UROGYNECOLOGY | Facility: CLINIC | Age: 63
End: 2023-09-11
Payer: MEDICARE

## 2023-09-11 VITALS
DIASTOLIC BLOOD PRESSURE: 72 MMHG | HEART RATE: 65 BPM | SYSTOLIC BLOOD PRESSURE: 156 MMHG | HEIGHT: 65 IN | WEIGHT: 195 LBS | BODY MASS INDEX: 32.49 KG/M2

## 2023-09-11 PROCEDURE — 99214 OFFICE O/P EST MOD 30 MIN: CPT | Mod: 25

## 2023-09-11 PROCEDURE — 51701 INSERT BLADDER CATHETER: CPT

## 2023-09-11 RX ORDER — VIBEGRON 75 MG/1
75 TABLET, FILM COATED ORAL
Qty: 90 | Refills: 0 | Status: ACTIVE | COMMUNITY
Start: 2023-03-27 | End: 1900-01-01

## 2023-09-11 RX ORDER — SOLIFENACIN SUCCINATE 10 MG/1
10 TABLET ORAL
Qty: 90 | Refills: 0 | Status: ACTIVE | COMMUNITY
Start: 2023-07-31 | End: 1900-01-01

## 2023-09-14 LAB — URINE CULTURE <10: NORMAL

## 2023-09-18 ENCOUNTER — APPOINTMENT (OUTPATIENT)
Dept: UROGYNECOLOGY | Facility: CLINIC | Age: 63
End: 2023-09-18

## 2023-10-23 ENCOUNTER — APPOINTMENT (OUTPATIENT)
Dept: UROGYNECOLOGY | Facility: CLINIC | Age: 63
End: 2023-10-23
Payer: MEDICARE

## 2023-10-23 VITALS
BODY MASS INDEX: 32.49 KG/M2 | HEIGHT: 65 IN | SYSTOLIC BLOOD PRESSURE: 108 MMHG | HEART RATE: 86 BPM | DIASTOLIC BLOOD PRESSURE: 64 MMHG | WEIGHT: 195 LBS

## 2023-10-23 DIAGNOSIS — K40.90 UNILATERAL INGUINAL HERNIA, W/OUT OBSTRUCTION OR GANGRENE, NOT SPECIFIED AS RECURRENT: ICD-10-CM

## 2023-10-23 LAB
BILIRUB UR QL STRIP: NEGATIVE
CLARITY UR: CLEAR
COLLECTION METHOD: NORMAL
GLUCOSE UR-MCNC: NEGATIVE
HCG UR QL: 0.2 EU/DL
HGB UR QL STRIP.AUTO: NEGATIVE
KETONES UR-MCNC: NEGATIVE
LEUKOCYTE ESTERASE UR QL STRIP: NORMAL
NITRITE UR QL STRIP: NEGATIVE
PH UR STRIP: 6
PROT UR STRIP-MCNC: NEGATIVE
SP GR UR STRIP: 1

## 2023-10-23 PROCEDURE — 81003 URINALYSIS AUTO W/O SCOPE: CPT | Mod: QW

## 2023-10-23 PROCEDURE — 52287 CYSTOSCOPY CHEMODENERVATION: CPT

## 2023-10-23 RX ORDER — BUPROPION HYDROCHLORIDE 100 MG/1
100 TABLET, FILM COATED ORAL
Refills: 0 | Status: ACTIVE | COMMUNITY

## 2023-10-23 RX ORDER — LEVOTHYROXINE SODIUM 100 UG/1
100 TABLET ORAL
Refills: 0 | Status: ACTIVE | COMMUNITY

## 2023-10-24 ENCOUNTER — APPOINTMENT (OUTPATIENT)
Dept: NEUROLOGY | Facility: CLINIC | Age: 63
End: 2023-10-24

## 2023-11-08 ENCOUNTER — APPOINTMENT (OUTPATIENT)
Dept: UROGYNECOLOGY | Facility: CLINIC | Age: 63
End: 2023-11-08
Payer: MEDICARE

## 2023-11-08 VITALS
BODY MASS INDEX: 32.49 KG/M2 | WEIGHT: 195 LBS | SYSTOLIC BLOOD PRESSURE: 135 MMHG | DIASTOLIC BLOOD PRESSURE: 81 MMHG | HEIGHT: 65 IN | HEART RATE: 74 BPM

## 2023-11-08 DIAGNOSIS — N39.46 MIXED INCONTINENCE: ICD-10-CM

## 2023-11-08 PROCEDURE — 99214 OFFICE O/P EST MOD 30 MIN: CPT | Mod: 25

## 2023-11-08 PROCEDURE — 51701 INSERT BLADDER CATHETER: CPT

## 2023-11-08 RX ORDER — NITROFURANTOIN (MONOHYDRATE/MACROCRYSTALS) 25; 75 MG/1; MG/1
100 CAPSULE ORAL
Qty: 10 | Refills: 0 | Status: COMPLETED | COMMUNITY
Start: 2023-10-23 | End: 2023-11-08

## 2023-11-13 LAB — URINE CULTURE <10: NORMAL

## 2024-01-01 NOTE — PRE-ANESTHESIA EVALUATION ADULT - MALLAMPATI CLASS
Goal Outcome Evaluation:           Progress: improving   VSS, voiding and stooling, formula feeding per mother request, weight loss of 4.17%, resting between care.                                Class II - visualization of the soft palate, fauces, and uvula

## 2024-02-01 ENCOUNTER — OUTPATIENT (OUTPATIENT)
Dept: OUTPATIENT SERVICES | Facility: HOSPITAL | Age: 64
LOS: 1 days | End: 2024-02-01
Payer: MEDICARE

## 2024-02-01 DIAGNOSIS — M25.572 PAIN IN LEFT ANKLE AND JOINTS OF LEFT FOOT: ICD-10-CM

## 2024-02-01 DIAGNOSIS — M25.571 PAIN IN RIGHT ANKLE AND JOINTS OF RIGHT FOOT: ICD-10-CM

## 2024-02-01 DIAGNOSIS — Z12.31 ENCOUNTER FOR SCREENING MAMMOGRAM FOR MALIGNANT NEOPLASM OF BREAST: ICD-10-CM

## 2024-02-01 DIAGNOSIS — Z30.2 ENCOUNTER FOR STERILIZATION: Chronic | ICD-10-CM

## 2024-02-01 DIAGNOSIS — Z98.890 OTHER SPECIFIED POSTPROCEDURAL STATES: Chronic | ICD-10-CM

## 2024-02-01 DIAGNOSIS — Z98.84 BARIATRIC SURGERY STATUS: Chronic | ICD-10-CM

## 2024-02-01 DIAGNOSIS — Z98.82 BREAST IMPLANT STATUS: Chronic | ICD-10-CM

## 2024-02-01 PROCEDURE — 77067 SCR MAMMO BI INCL CAD: CPT | Mod: 26

## 2024-02-01 PROCEDURE — 77063 BREAST TOMOSYNTHESIS BI: CPT | Mod: 26

## 2024-02-01 PROCEDURE — 73610 X-RAY EXAM OF ANKLE: CPT | Mod: 50

## 2024-02-01 PROCEDURE — 77067 SCR MAMMO BI INCL CAD: CPT

## 2024-02-01 PROCEDURE — 77063 BREAST TOMOSYNTHESIS BI: CPT

## 2024-02-01 PROCEDURE — 73610 X-RAY EXAM OF ANKLE: CPT | Mod: 26,50

## 2024-02-02 DIAGNOSIS — M25.571 PAIN IN RIGHT ANKLE AND JOINTS OF RIGHT FOOT: ICD-10-CM

## 2024-02-02 DIAGNOSIS — Z12.31 ENCOUNTER FOR SCREENING MAMMOGRAM FOR MALIGNANT NEOPLASM OF BREAST: ICD-10-CM

## 2024-02-02 DIAGNOSIS — M25.572 PAIN IN LEFT ANKLE AND JOINTS OF LEFT FOOT: ICD-10-CM

## 2024-03-27 ENCOUNTER — LABORATORY RESULT (OUTPATIENT)
Age: 64
End: 2024-03-27

## 2024-03-29 LAB — BACTERIA UR CULT: NORMAL

## 2024-04-01 ENCOUNTER — APPOINTMENT (OUTPATIENT)
Dept: VASCULAR SURGERY | Facility: CLINIC | Age: 64
End: 2024-04-01
Payer: MEDICARE

## 2024-04-01 VITALS — WEIGHT: 196 LBS | BODY MASS INDEX: 32.62 KG/M2

## 2024-04-01 VITALS — SYSTOLIC BLOOD PRESSURE: 129 MMHG | HEART RATE: 75 BPM | DIASTOLIC BLOOD PRESSURE: 84 MMHG

## 2024-04-01 VITALS — HEIGHT: 65 IN

## 2024-04-01 DIAGNOSIS — R22.43 LOCALIZED SWELLING, MASS AND LUMP, LOWER LIMB, BILATERAL: ICD-10-CM

## 2024-04-01 PROCEDURE — 99203 OFFICE O/P NEW LOW 30 MIN: CPT

## 2024-04-01 PROCEDURE — 93970 EXTREMITY STUDY: CPT

## 2024-04-01 NOTE — PHYSICAL EXAM
[2+] : right 2+ [Ankle Swelling (On Exam)] : present [Ankle Swelling Bilaterally] : bilaterally  [Ankle Swelling On The Left] : moderate [] : present [Ankle Swelling On The Right] : mild [Varicose Veins Of Lower Extremities] : not present [FreeTextEntry1] : Bilateral lower extremity mild telangiectasias present

## 2024-04-01 NOTE — ASSESSMENT
[FreeTextEntry1] : The patient is a 63-year-old female who presents with bilateral lower extremity ankle swelling.  I performed a venous duplex that shows no evidence of DVT.  Patient has some mild telangiectasias present bilaterally.  She has no evidence of gross varicosities present.  I believe the patient will benefit from a compression stocking therapy at 15 to 20 mmHg compression to be worn daily to treat the symptoms.  No further vascular surgery intervention warranted at this time.

## 2024-04-01 NOTE — DATA REVIEWED
[FreeTextEntry1] : Venous duplex shows no evidence of acute or chronic deep vein thrombosis present in her bilateral lower extremities.

## 2024-04-01 NOTE — HISTORY OF PRESENT ILLNESS
[FreeTextEntry1] : The patient is a 63-year-old female who presents complaining of bilateral ankle swelling.  The patient denies a history of DVT.  She has had multiple surgical interventions in the past and never developed a DVT.  The patient also complains of some discoloration of her ankle and darkening of the skin.

## 2024-04-02 ENCOUNTER — OUTPATIENT (OUTPATIENT)
Dept: OUTPATIENT SERVICES | Facility: HOSPITAL | Age: 64
LOS: 1 days | End: 2024-04-02
Payer: MEDICARE

## 2024-04-02 DIAGNOSIS — Z98.84 BARIATRIC SURGERY STATUS: Chronic | ICD-10-CM

## 2024-04-02 DIAGNOSIS — Z98.890 OTHER SPECIFIED POSTPROCEDURAL STATES: Chronic | ICD-10-CM

## 2024-04-02 DIAGNOSIS — Z98.82 BREAST IMPLANT STATUS: Chronic | ICD-10-CM

## 2024-04-02 DIAGNOSIS — Z28.82 IMMUNIZATION NOT CARRIED OUT BECAUSE OF CAREGIVER REFUSAL: ICD-10-CM

## 2024-04-02 DIAGNOSIS — Z30.2 ENCOUNTER FOR STERILIZATION: Chronic | ICD-10-CM

## 2024-04-02 DIAGNOSIS — N63.0 UNSPECIFIED LUMP IN UNSPECIFIED BREAST: ICD-10-CM

## 2024-04-02 PROCEDURE — 77047 MRI BREAST C- BILATERAL: CPT

## 2024-04-02 PROCEDURE — 77047 MRI BREAST C- BILATERAL: CPT | Mod: 26

## 2024-04-03 DIAGNOSIS — N63.0 UNSPECIFIED LUMP IN UNSPECIFIED BREAST: ICD-10-CM

## 2024-04-17 ENCOUNTER — APPOINTMENT (OUTPATIENT)
Dept: ORTHOPEDIC SURGERY | Facility: CLINIC | Age: 64
End: 2024-04-17
Payer: MEDICARE

## 2024-04-17 PROCEDURE — 99204 OFFICE O/P NEW MOD 45 MIN: CPT | Mod: 25

## 2024-04-17 PROCEDURE — 20611 DRAIN/INJ JOINT/BURSA W/US: CPT | Mod: LT

## 2024-04-17 PROCEDURE — 73562 X-RAY EXAM OF KNEE 3: CPT | Mod: LT
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
## 2024-05-13 ENCOUNTER — LABORATORY RESULT (OUTPATIENT)
Age: 64
End: 2024-05-13

## 2024-05-17 ENCOUNTER — APPOINTMENT (OUTPATIENT)
Dept: UROGYNECOLOGY | Facility: CLINIC | Age: 64
End: 2024-05-17

## 2024-06-18 ENCOUNTER — NON-APPOINTMENT (OUTPATIENT)
Age: 64
End: 2024-06-18

## 2024-06-18 ENCOUNTER — APPOINTMENT (OUTPATIENT)
Dept: ORTHOPEDIC SURGERY | Facility: CLINIC | Age: 64
End: 2024-06-18
Payer: MEDICARE

## 2024-06-18 DIAGNOSIS — M17.12 UNILATERAL PRIMARY OSTEOARTHRITIS, LEFT KNEE: ICD-10-CM

## 2024-06-18 PROCEDURE — 99215 OFFICE O/P EST HI 40 MIN: CPT

## 2024-06-19 PROBLEM — M17.12 PRIMARY OSTEOARTHRITIS OF LEFT KNEE: Status: ACTIVE | Noted: 2024-04-17

## 2024-06-19 NOTE — HISTORY OF PRESENT ILLNESS
[de-identified] : Left knee pain feels very disabled from the pain difficulty walking uses a cane unable to work at this point wants to do a left tka  Mechanical pain made worse with activity, not completely improved with rest, interfering with ADLs. At this point nonoperative management of the symptoms is ineffective and the patient has interest in moving forward with a joint replacement.   Past medical history is on the chart for review and as mentioned above.   ROS is negative for fever, chills, CP, SOB, unexplained weight loss or gain.       LEFT KNEE EXAM Knee is painful with PROM and limited in flexion and extension by guarding. Crepitation with ROM +JLT, TTP over the femoral condyles There is significant guarding throughout the exam limiting ROM testing. AROM demonstrated is 2-100 NVID No gross instability noted but exam limited by pain/guarding.     Imaging: X-rays AP and Lateral weight bearing views of the knees shows advanced degenerative changes including loss of the joint space with bone to bone apposition, osteophytes, subchondral sclerosis and cystic changes of the left knee.     Assessment: Severe left knee arthritis.     Plan is for a left  total knee replacement. The surgery, preoperative workup, surgical procedure, hospital course, post operative course, rehab, and expected outcomes were discussed. A description of the surgery in layman's terms, using models equivalent to the implants used during surgery, was a part of this conversation. The tibia and femur are resurfaced, ligament balance is restored, and the patella is addressed on a case by case basis, either resurfaced or preserved.   The patient will require a rolling walker for 2-4 weeks postoperativley due to gait instability to help with mobility related ADL's and a commode as they confined to a one level without access to a bathroom.   The risks and benefits of the surgery were discussed.  Benefits  were described as improvement in pain and function.  Risks including bleeding, infection, blood clots, DVT, PE, stroke, heart attack, fracture, loss of motion, arthrofibrosis, implant loosening, instability, nerve palsy, neurovascular injury, persistent pain, RSD, and in rare cases death. Should the implant not function as expected or wear out then revision surgery may be required in the future.   We will proceed with scheduling the procedure.

## 2024-06-27 ENCOUNTER — LABORATORY RESULT (OUTPATIENT)
Age: 64
End: 2024-06-27

## 2024-07-05 ENCOUNTER — APPOINTMENT (OUTPATIENT)
Dept: UROGYNECOLOGY | Facility: CLINIC | Age: 64
End: 2024-07-05
Payer: MEDICARE

## 2024-07-05 VITALS
DIASTOLIC BLOOD PRESSURE: 79 MMHG | SYSTOLIC BLOOD PRESSURE: 138 MMHG | HEIGHT: 65 IN | HEART RATE: 79 BPM | BODY MASS INDEX: 32.32 KG/M2 | WEIGHT: 194 LBS

## 2024-07-05 DIAGNOSIS — K45.8 OTHER SPECIFIED ABDOMINAL HERNIA W/OUT OBSTRUCTION OR GANGRENE: ICD-10-CM

## 2024-07-05 DIAGNOSIS — S39.011A STRAIN OF MUSCLE, FASCIA AND TENDON OF ABDOMEN, INITIAL ENCOUNTER: ICD-10-CM

## 2024-07-05 LAB
BILIRUB UR QL STRIP: NORMAL
CLARITY UR: CLEAR
COLLECTION METHOD: NORMAL
GLUCOSE UR-MCNC: NEGATIVE
HCG UR QL: 1 EU/DL
HGB UR QL STRIP.AUTO: NEGATIVE
KETONES UR-MCNC: NORMAL
LEUKOCYTE ESTERASE UR QL STRIP: NEGATIVE
NITRITE UR QL STRIP: NEGATIVE
PH UR STRIP: 6.5
PROT UR STRIP-MCNC: NEGATIVE
SP GR UR STRIP: 1.02

## 2024-07-05 PROCEDURE — 81003 URINALYSIS AUTO W/O SCOPE: CPT | Mod: QW

## 2024-07-05 PROCEDURE — 52287 CYSTOSCOPY CHEMODENERVATION: CPT

## 2024-07-05 RX ORDER — PRAVASTATIN SODIUM 80 MG/1
TABLET ORAL
Refills: 0 | Status: ACTIVE | COMMUNITY

## 2024-07-05 RX ORDER — SERTRALINE HYDROCHLORIDE 50 MG/1
50 TABLET, FILM COATED ORAL
Refills: 0 | Status: ACTIVE | COMMUNITY

## 2024-07-13 NOTE — ASU PATIENT PROFILE, ADULT - REASON FOR ADMISSION, PROFILE
c/o gas frequent bm's . planning barratric surgery Consideration of Admission/Observation Admission.

## 2024-07-16 ENCOUNTER — OUTPATIENT (OUTPATIENT)
Dept: OUTPATIENT SERVICES | Facility: HOSPITAL | Age: 64
LOS: 1 days | End: 2024-07-16
Payer: MEDICARE

## 2024-07-16 DIAGNOSIS — Z98.890 OTHER SPECIFIED POSTPROCEDURAL STATES: Chronic | ICD-10-CM

## 2024-07-16 DIAGNOSIS — Z00.8 ENCOUNTER FOR OTHER GENERAL EXAMINATION: ICD-10-CM

## 2024-07-16 DIAGNOSIS — C18.9 MALIGNANT NEOPLASM OF COLON, UNSPECIFIED: ICD-10-CM

## 2024-07-16 DIAGNOSIS — Z30.2 ENCOUNTER FOR STERILIZATION: Chronic | ICD-10-CM

## 2024-07-16 DIAGNOSIS — Z98.82 BREAST IMPLANT STATUS: Chronic | ICD-10-CM

## 2024-07-16 PROCEDURE — 74018 RADEX ABDOMEN 1 VIEW: CPT

## 2024-07-16 PROCEDURE — 74018 RADEX ABDOMEN 1 VIEW: CPT | Mod: 26

## 2024-07-17 DIAGNOSIS — C18.9 MALIGNANT NEOPLASM OF COLON, UNSPECIFIED: ICD-10-CM

## 2024-07-18 ENCOUNTER — APPOINTMENT (OUTPATIENT)
Dept: UROGYNECOLOGY | Facility: CLINIC | Age: 64
End: 2024-07-18
Payer: MEDICARE

## 2024-07-18 VITALS
SYSTOLIC BLOOD PRESSURE: 134 MMHG | DIASTOLIC BLOOD PRESSURE: 72 MMHG | HEART RATE: 72 BPM | WEIGHT: 194 LBS | BODY MASS INDEX: 32.32 KG/M2 | HEIGHT: 65 IN

## 2024-07-18 DIAGNOSIS — N39.46 MIXED INCONTINENCE: ICD-10-CM

## 2024-07-18 PROCEDURE — 99459 PELVIC EXAMINATION: CPT

## 2024-07-18 PROCEDURE — 51701 INSERT BLADDER CATHETER: CPT

## 2024-07-18 PROCEDURE — 99214 OFFICE O/P EST MOD 30 MIN: CPT | Mod: 25

## 2024-07-18 RX ORDER — NITROFURANTOIN (MONOHYDRATE/MACROCRYSTALS) 25; 75 MG/1; MG/1
100 CAPSULE ORAL
Qty: 14 | Refills: 0 | Status: COMPLETED | COMMUNITY
Start: 2024-07-05 | End: 2024-07-18

## 2024-07-24 LAB — URINE CULTURE <10: NORMAL

## 2024-08-07 PROBLEM — R30.0 DYSURIA: Status: ACTIVE | Noted: 2024-08-07

## 2024-08-08 ENCOUNTER — LABORATORY RESULT (OUTPATIENT)
Age: 64
End: 2024-08-08

## 2024-08-27 ENCOUNTER — APPOINTMENT (OUTPATIENT)
Dept: UROGYNECOLOGY | Facility: CLINIC | Age: 64
End: 2024-08-27

## 2024-09-04 ENCOUNTER — RESULT REVIEW (OUTPATIENT)
Age: 64
End: 2024-09-04

## 2024-09-04 ENCOUNTER — OUTPATIENT (OUTPATIENT)
Dept: OUTPATIENT SERVICES | Facility: HOSPITAL | Age: 64
LOS: 1 days | End: 2024-09-04
Payer: MEDICARE

## 2024-09-04 VITALS
WEIGHT: 185.19 LBS | TEMPERATURE: 98 F | SYSTOLIC BLOOD PRESSURE: 164 MMHG | OXYGEN SATURATION: 100 % | HEIGHT: 65 IN | DIASTOLIC BLOOD PRESSURE: 91 MMHG | RESPIRATION RATE: 16 BRPM | HEART RATE: 72 BPM

## 2024-09-04 DIAGNOSIS — Z98.890 OTHER SPECIFIED POSTPROCEDURAL STATES: Chronic | ICD-10-CM

## 2024-09-04 DIAGNOSIS — Z30.2 ENCOUNTER FOR STERILIZATION: Chronic | ICD-10-CM

## 2024-09-04 DIAGNOSIS — M17.12 UNILATERAL PRIMARY OSTEOARTHRITIS, LEFT KNEE: ICD-10-CM

## 2024-09-04 DIAGNOSIS — Z01.818 ENCOUNTER FOR OTHER PREPROCEDURAL EXAMINATION: ICD-10-CM

## 2024-09-04 DIAGNOSIS — Z98.82 BREAST IMPLANT STATUS: Chronic | ICD-10-CM

## 2024-09-04 DIAGNOSIS — Z98.84 BARIATRIC SURGERY STATUS: Chronic | ICD-10-CM

## 2024-09-04 LAB
A1C WITH ESTIMATED AVERAGE GLUCOSE RESULT: 5.1 % — SIGNIFICANT CHANGE UP (ref 4–5.6)
ALBUMIN SERPL ELPH-MCNC: 4.3 G/DL — SIGNIFICANT CHANGE UP (ref 3.5–5.2)
ALP SERPL-CCNC: 73 U/L — SIGNIFICANT CHANGE UP (ref 30–115)
ALT FLD-CCNC: 23 U/L — SIGNIFICANT CHANGE UP (ref 0–41)
ANION GAP SERPL CALC-SCNC: 12 MMOL/L — SIGNIFICANT CHANGE UP (ref 7–14)
APTT BLD: 33.6 SEC — SIGNIFICANT CHANGE UP (ref 27–39.2)
AST SERPL-CCNC: 22 U/L — SIGNIFICANT CHANGE UP (ref 0–41)
BASOPHILS # BLD AUTO: 0.02 K/UL — SIGNIFICANT CHANGE UP (ref 0–0.2)
BASOPHILS NFR BLD AUTO: 0.4 % — SIGNIFICANT CHANGE UP (ref 0–1)
BILIRUB SERPL-MCNC: 0.5 MG/DL — SIGNIFICANT CHANGE UP (ref 0.2–1.2)
BUN SERPL-MCNC: 12 MG/DL — SIGNIFICANT CHANGE UP (ref 10–20)
CALCIUM SERPL-MCNC: 9.1 MG/DL — SIGNIFICANT CHANGE UP (ref 8.4–10.5)
CHLORIDE SERPL-SCNC: 101 MMOL/L — SIGNIFICANT CHANGE UP (ref 98–110)
CO2 SERPL-SCNC: 25 MMOL/L — SIGNIFICANT CHANGE UP (ref 17–32)
CREAT SERPL-MCNC: 0.5 MG/DL — LOW (ref 0.7–1.5)
EGFR: 105 ML/MIN/1.73M2 — SIGNIFICANT CHANGE UP
EOSINOPHIL # BLD AUTO: 0.03 K/UL — SIGNIFICANT CHANGE UP (ref 0–0.7)
EOSINOPHIL NFR BLD AUTO: 0.6 % — SIGNIFICANT CHANGE UP (ref 0–8)
ESTIMATED AVERAGE GLUCOSE: 100 MG/DL — SIGNIFICANT CHANGE UP (ref 68–114)
GLUCOSE SERPL-MCNC: 112 MG/DL — HIGH (ref 70–99)
HCT VFR BLD CALC: 36.3 % — LOW (ref 37–47)
HGB BLD-MCNC: 12.5 G/DL — SIGNIFICANT CHANGE UP (ref 12–16)
IMM GRANULOCYTES NFR BLD AUTO: 0.2 % — SIGNIFICANT CHANGE UP (ref 0.1–0.3)
INR BLD: 1.08 RATIO — SIGNIFICANT CHANGE UP (ref 0.65–1.3)
LYMPHOCYTES # BLD AUTO: 1.39 K/UL — SIGNIFICANT CHANGE UP (ref 1.2–3.4)
LYMPHOCYTES # BLD AUTO: 26.1 % — SIGNIFICANT CHANGE UP (ref 20.5–51.1)
MCHC RBC-ENTMCNC: 31 PG — SIGNIFICANT CHANGE UP (ref 27–31)
MCHC RBC-ENTMCNC: 34.4 G/DL — SIGNIFICANT CHANGE UP (ref 32–37)
MCV RBC AUTO: 90.1 FL — SIGNIFICANT CHANGE UP (ref 81–99)
MONOCYTES # BLD AUTO: 0.36 K/UL — SIGNIFICANT CHANGE UP (ref 0.1–0.6)
MONOCYTES NFR BLD AUTO: 6.8 % — SIGNIFICANT CHANGE UP (ref 1.7–9.3)
MRSA PCR RESULT.: NEGATIVE — SIGNIFICANT CHANGE UP
NEUTROPHILS # BLD AUTO: 3.52 K/UL — SIGNIFICANT CHANGE UP (ref 1.4–6.5)
NEUTROPHILS NFR BLD AUTO: 65.9 % — SIGNIFICANT CHANGE UP (ref 42.2–75.2)
NRBC # BLD: 0 /100 WBCS — SIGNIFICANT CHANGE UP (ref 0–0)
PLATELET # BLD AUTO: 291 K/UL — SIGNIFICANT CHANGE UP (ref 130–400)
PMV BLD: 9.8 FL — SIGNIFICANT CHANGE UP (ref 7.4–10.4)
POTASSIUM SERPL-MCNC: 3.8 MMOL/L — SIGNIFICANT CHANGE UP (ref 3.5–5)
POTASSIUM SERPL-SCNC: 3.8 MMOL/L — SIGNIFICANT CHANGE UP (ref 3.5–5)
PROT SERPL-MCNC: 6.9 G/DL — SIGNIFICANT CHANGE UP (ref 6–8)
PROTHROM AB SERPL-ACNC: 12.3 SEC — SIGNIFICANT CHANGE UP (ref 9.95–12.87)
RBC # BLD: 4.03 M/UL — LOW (ref 4.2–5.4)
RBC # FLD: 12.9 % — SIGNIFICANT CHANGE UP (ref 11.5–14.5)
SODIUM SERPL-SCNC: 138 MMOL/L — SIGNIFICANT CHANGE UP (ref 135–146)
WBC # BLD: 5.33 K/UL — SIGNIFICANT CHANGE UP (ref 4.8–10.8)
WBC # FLD AUTO: 5.33 K/UL — SIGNIFICANT CHANGE UP (ref 4.8–10.8)

## 2024-09-04 PROCEDURE — 93010 ELECTROCARDIOGRAM REPORT: CPT

## 2024-09-04 PROCEDURE — 86850 RBC ANTIBODY SCREEN: CPT

## 2024-09-04 PROCEDURE — 93005 ELECTROCARDIOGRAM TRACING: CPT

## 2024-09-04 PROCEDURE — 85610 PROTHROMBIN TIME: CPT

## 2024-09-04 PROCEDURE — 85025 COMPLETE CBC W/AUTO DIFF WBC: CPT

## 2024-09-04 PROCEDURE — 87640 STAPH A DNA AMP PROBE: CPT

## 2024-09-04 PROCEDURE — 83036 HEMOGLOBIN GLYCOSYLATED A1C: CPT

## 2024-09-04 PROCEDURE — 36415 COLL VENOUS BLD VENIPUNCTURE: CPT

## 2024-09-04 PROCEDURE — 85730 THROMBOPLASTIN TIME PARTIAL: CPT

## 2024-09-04 PROCEDURE — 86901 BLOOD TYPING SEROLOGIC RH(D): CPT

## 2024-09-04 PROCEDURE — 99214 OFFICE O/P EST MOD 30 MIN: CPT | Mod: 25

## 2024-09-04 PROCEDURE — 73562 X-RAY EXAM OF KNEE 3: CPT | Mod: LT

## 2024-09-04 PROCEDURE — 72170 X-RAY EXAM OF PELVIS: CPT | Mod: 26

## 2024-09-04 PROCEDURE — 73562 X-RAY EXAM OF KNEE 3: CPT | Mod: 26,LT

## 2024-09-04 PROCEDURE — 72170 X-RAY EXAM OF PELVIS: CPT

## 2024-09-04 PROCEDURE — 87641 MR-STAPH DNA AMP PROBE: CPT

## 2024-09-04 PROCEDURE — 86900 BLOOD TYPING SEROLOGIC ABO: CPT

## 2024-09-04 PROCEDURE — 80053 COMPREHEN METABOLIC PANEL: CPT

## 2024-09-04 NOTE — H&P PST ADULT - REASON FOR ADMISSION
Case Type: OP  Suite: OR [facility]  Proceduralist: Bobby Melendez  Confirmed Surgery Date Time: 09- -  PAST Date Time: 09- - 18:15  Procedure: Left Total Knee Replacement  Laterality: Left  Length of Procedure: 120 Minutes  Anesthesia Type: Regional

## 2024-09-04 NOTE — H&P PST ADULT - NSICDXPASTMEDICALHX_GEN_ALL_CORE_FT
PAST MEDICAL HISTORY:  Arthritis     COPD (chronic obstructive pulmonary disease)     Depressed     Ex-cigarette smoker     Gastroesophageal reflux disease with esophagitis     Hypercholesteremia     Hypertension     Hypothyroid     AMADEO (obstructive sleep apnea)     Overactive bladder

## 2024-09-04 NOTE — H&P PST ADULT - HISTORY OF PRESENT ILLNESS
Patient is a 62 yo female who presents to pretesting for the preparations of the Left total knee Replacement surgery due to left knee sharp pains scale 8/10 with limited ROM and partial reliefs with pain meds.   Patient denies any cp, sob, palpitations, fever, cough, URI, abdominal pains, N/V, UTI, Rashes or open wounds. Patient is incontinence and wears diaper.   As per patient exercise tolerance of 1 fos walks with out sob except left knee pains   Patient denies any s/s covid 19 and reports no contact with known positive people. Anesthesia Alert  NO--Difficult Airway  NO--History of neck surgery or radiation  NO--Limited ROM of neck  NO--History of Malignant hyperthermia  NO--No personal or family history of Pseudocholinesterase deficiency.  NO--Prior Anesthesia Complication  NO--Latex Allergy  NO--Loose teeth, Lower dentures   NO--History of Rheumatoid Arthritis  YES--AMADEO. NO CPAP in use   NO--Bleeding Risk  Pt instructed to stop vitamins/supplements/herbal medications for one week prior to surgery  As per patient this is the complete medical, surgical history and medications.  Duke Activity Status Index (DASI) from [employer]  on 9/4/2024  ** All calculations should be rechecked by clinician prior to use **  RESULT SUMMARY:  20.7 points  The higher the score (maximum 58.2), the higher the functional status.  5.29 METs  INPUTS:  Take care of self —> 2.75 = Yes  Walk indoors —> 1.75 = Yes  Walk 1&ndash;2 blocks on level ground —> 2.75 = Yes  Climb a flight of stairs or walk up a hill —> 5.5 = Yes  Run a short distance —> 0 = No  Do light work around the house —> 2.7 = Yes  Do moderate work around the house —> 0 = No  Do heavy work around the house —> 0 = No  Do yardwork —> 0 = No  Have sexual relations —> 5.25 = Yes  Participate in moderate recreational activities —> 0 = No  Participate in strenuous sports —> 0 = No  Revised Cardiac Risk Index for Pre-Operative Risk from [employer]  on 9/4/2024  ** All calculations should be rechecked by clinician prior to use **  RESULT SUMMARY:  1 points  Class II Risk  6.0 %  30-day risk of death, MI, or cardiac arrest  From [place] 2017. These numbers are higher than those from the original study (Wolf 1999). See Evidence for details.  INPUTS:  Elevated-risk surgery —> 1 = Yes  History of ischemic heart disease —> 0 = No  History of congestive heart failure —> 0 = No  History of cerebrovascular disease —> 0 = No  Pre-operative treatment with insulin —> 0 = No  Pre-operative creatinine >2 mg/dL / 176.8 µmol/L —> 0 = No  Opioid Risk Assessment Tool (Female)       Family history of substance abuse            Alcohol (1)            Illegal Drugs (2)            Prescription drugs (4)       Personal history of substance abuse            Alcohol (3)            Illegal Drugs (4)            Prescription drugs (5)       Age between 16-45 (1)       History of preadolescent sexual abuse (3)       Psychological disease (ADD, ADHD, OCD, Bipolar Disorder, Schizophrenia, Depression) (2)    Scoring Totals:  Low Risk (0-3)  Moderate Risk (4-7)  High Risk (>/=8)  Appointment to see pain management next week

## 2024-09-04 NOTE — H&P PST ADULT - MUSCULOSKELETAL
Left LE/ROM intact/decreased ROM due to pain/normal gait/strength 5/5 bilateral upper extremities details…

## 2024-09-05 DIAGNOSIS — Z01.818 ENCOUNTER FOR OTHER PREPROCEDURAL EXAMINATION: ICD-10-CM

## 2024-09-05 DIAGNOSIS — M17.12 UNILATERAL PRIMARY OSTEOARTHRITIS, LEFT KNEE: ICD-10-CM

## 2024-09-05 LAB — BLD GP AB SCN SERPL QL: SIGNIFICANT CHANGE UP

## 2024-09-23 ENCOUNTER — APPOINTMENT (OUTPATIENT)
Dept: ORTHOPEDIC SURGERY | Facility: HOSPITAL | Age: 64
End: 2024-09-23

## 2024-09-23 ENCOUNTER — RESULT REVIEW (OUTPATIENT)
Age: 64
End: 2024-09-23

## 2024-09-23 ENCOUNTER — INPATIENT (INPATIENT)
Facility: HOSPITAL | Age: 64
LOS: 0 days | Discharge: HOME HEALTH | DRG: 470 | End: 2024-09-24
Attending: ORTHOPAEDIC SURGERY | Admitting: ORTHOPAEDIC SURGERY
Payer: MEDICARE

## 2024-09-23 VITALS
HEIGHT: 65 IN | RESPIRATION RATE: 17 BRPM | TEMPERATURE: 98 F | HEART RATE: 63 BPM | SYSTOLIC BLOOD PRESSURE: 156 MMHG | WEIGHT: 184.97 LBS | DIASTOLIC BLOOD PRESSURE: 76 MMHG | OXYGEN SATURATION: 98 %

## 2024-09-23 DIAGNOSIS — Z98.890 OTHER SPECIFIED POSTPROCEDURAL STATES: Chronic | ICD-10-CM

## 2024-09-23 DIAGNOSIS — M17.12 UNILATERAL PRIMARY OSTEOARTHRITIS, LEFT KNEE: ICD-10-CM

## 2024-09-23 DIAGNOSIS — Z30.2 ENCOUNTER FOR STERILIZATION: Chronic | ICD-10-CM

## 2024-09-23 DIAGNOSIS — Z98.82 BREAST IMPLANT STATUS: Chronic | ICD-10-CM

## 2024-09-23 DIAGNOSIS — Z98.84 BARIATRIC SURGERY STATUS: Chronic | ICD-10-CM

## 2024-09-23 LAB — GLUCOSE BLDC GLUCOMTR-MCNC: 87 MG/DL — SIGNIFICANT CHANGE UP (ref 70–99)

## 2024-09-23 PROCEDURE — 85027 COMPLETE CBC AUTOMATED: CPT

## 2024-09-23 PROCEDURE — 27447 TOTAL KNEE ARTHROPLASTY: CPT | Mod: LT

## 2024-09-23 PROCEDURE — 94010 BREATHING CAPACITY TEST: CPT

## 2024-09-23 PROCEDURE — 88311 DECALCIFY TISSUE: CPT | Mod: 26

## 2024-09-23 PROCEDURE — 88305 TISSUE EXAM BY PATHOLOGIST: CPT | Mod: 26

## 2024-09-23 PROCEDURE — 36415 COLL VENOUS BLD VENIPUNCTURE: CPT

## 2024-09-23 PROCEDURE — 80048 BASIC METABOLIC PNL TOTAL CA: CPT

## 2024-09-23 PROCEDURE — 97162 PT EVAL MOD COMPLEX 30 MIN: CPT | Mod: GP

## 2024-09-23 PROCEDURE — 73560 X-RAY EXAM OF KNEE 1 OR 2: CPT | Mod: 26,LT

## 2024-09-23 PROCEDURE — 97165 OT EVAL LOW COMPLEX 30 MIN: CPT | Mod: GO

## 2024-09-23 PROCEDURE — 73560 X-RAY EXAM OF KNEE 1 OR 2: CPT | Mod: LT

## 2024-09-23 RX ORDER — KETOROLAC TROMETHAMINE 10 MG/1
15 TABLET, FILM COATED ORAL EVERY 6 HOURS
Refills: 0 | Status: DISCONTINUED | OUTPATIENT
Start: 2024-09-23 | End: 2024-09-24

## 2024-09-23 RX ORDER — ACETAMINOPHEN 325 MG
1000 TABLET ORAL ONCE
Refills: 0 | Status: COMPLETED | OUTPATIENT
Start: 2024-09-23 | End: 2024-09-23

## 2024-09-23 RX ORDER — LOSARTAN POTASSIUM 100 MG/1
100 TABLET, FILM COATED ORAL DAILY
Refills: 0 | Status: DISCONTINUED | OUTPATIENT
Start: 2024-09-23 | End: 2024-09-24

## 2024-09-23 RX ORDER — MULTI VITAMIN/MINERAL SUPPLEMENT WITH ASCORBIC ACID, NIACIN, PYRIDOXINE, PANTOTHENIC ACID, FOLIC ACID, RIBOFLAVIN, THIAMIN, BIOTIN, COBALAMIN AND ZINC. 60; 20; 12.5; 10; 10; 1.7; 1.5; 1; .3; .006 MG/1; MG/1; MG/1; MG/1; MG/1; MG/1; MG/1; MG/1; MG/1; MG/1
1 TABLET, COATED ORAL DAILY
Refills: 0 | Status: DISCONTINUED | OUTPATIENT
Start: 2024-09-23 | End: 2024-09-24

## 2024-09-23 RX ORDER — ONDANSETRON HCL/PF 4 MG/2 ML
4 VIAL (ML) INJECTION ONCE
Refills: 0 | Status: DISCONTINUED | OUTPATIENT
Start: 2024-09-23 | End: 2024-09-23

## 2024-09-23 RX ORDER — ATORVASTATIN CALCIUM 10 MG/1
10 TABLET, FILM COATED ORAL AT BEDTIME
Refills: 0 | Status: DISCONTINUED | OUTPATIENT
Start: 2024-09-23 | End: 2024-09-24

## 2024-09-23 RX ORDER — SODIUM CHLORIDE 0.9 % (FLUSH) 0.9 %
1000 SYRINGE (ML) INJECTION
Refills: 0 | Status: DISCONTINUED | OUTPATIENT
Start: 2024-09-23 | End: 2024-09-24

## 2024-09-23 RX ORDER — LOSARTAN POTASSIUM 50 MG/1
1 TABLET ORAL
Refills: 0 | DISCHARGE

## 2024-09-23 RX ORDER — SENNOSIDES 8.6 MG
2 TABLET ORAL AT BEDTIME
Refills: 0 | Status: DISCONTINUED | OUTPATIENT
Start: 2024-09-23 | End: 2024-09-24

## 2024-09-23 RX ORDER — SERTRALINE HYDROCHLORIDE 100 MG/1
100 TABLET, FILM COATED ORAL DAILY
Refills: 0 | Status: DISCONTINUED | OUTPATIENT
Start: 2024-09-23 | End: 2024-09-24

## 2024-09-23 RX ORDER — PANTOPRAZOLE SODIUM 40 MG/1
40 TABLET, DELAYED RELEASE ORAL
Refills: 0 | Status: DISCONTINUED | OUTPATIENT
Start: 2024-09-23 | End: 2024-09-24

## 2024-09-23 RX ORDER — MAGNESIUM HYDROXIDE 400 MG/5ML
30 SUSPENSION, ORAL (FINAL DOSE FORM) ORAL DAILY
Refills: 0 | Status: DISCONTINUED | OUTPATIENT
Start: 2024-09-23 | End: 2024-09-24

## 2024-09-23 RX ORDER — FOLIC ACID 1 MG/1
1 TABLET ORAL AT BEDTIME
Refills: 0 | Status: DISCONTINUED | OUTPATIENT
Start: 2024-09-23 | End: 2024-09-24

## 2024-09-23 RX ORDER — BUPROPION HYDROCHLORIDE 150 MG/1
1 TABLET ORAL
Refills: 0 | DISCHARGE

## 2024-09-23 RX ORDER — ASPIRIN 325 MG
81 TABLET ORAL
Refills: 0 | Status: DISCONTINUED | OUTPATIENT
Start: 2024-09-23 | End: 2024-09-24

## 2024-09-23 RX ORDER — ONDANSETRON HCL/PF 4 MG/2 ML
4 VIAL (ML) INJECTION EVERY 6 HOURS
Refills: 0 | Status: DISCONTINUED | OUTPATIENT
Start: 2024-09-23 | End: 2024-09-24

## 2024-09-23 RX ORDER — ACETAMINOPHEN 325 MG
650 TABLET ORAL EVERY 6 HOURS
Refills: 0 | Status: DISCONTINUED | OUTPATIENT
Start: 2024-09-23 | End: 2024-09-24

## 2024-09-23 RX ORDER — HYDROMORPHONE HYDROCHLORIDE 1 MG/ML
1 INJECTION, SOLUTION INTRAMUSCULAR; INTRAVENOUS; SUBCUTANEOUS
Refills: 0 | Status: DISCONTINUED | OUTPATIENT
Start: 2024-09-23 | End: 2024-09-23

## 2024-09-23 RX ORDER — CEFAZOLIN SODIUM 1 G
2000 VIAL (EA) INJECTION EVERY 8 HOURS
Refills: 0 | Status: COMPLETED | OUTPATIENT
Start: 2024-09-24 | End: 2024-09-24

## 2024-09-23 RX ORDER — HYDROMORPHONE HYDROCHLORIDE 1 MG/ML
0.5 INJECTION, SOLUTION INTRAMUSCULAR; INTRAVENOUS; SUBCUTANEOUS
Refills: 0 | Status: DISCONTINUED | OUTPATIENT
Start: 2024-09-23 | End: 2024-09-23

## 2024-09-23 RX ORDER — SODIUM CHLORIDE IRRIG SOLUTION 0.9 %
1000 SOLUTION, IRRIGATION IRRIGATION
Refills: 0 | Status: DISCONTINUED | OUTPATIENT
Start: 2024-09-23 | End: 2024-09-23

## 2024-09-23 RX ORDER — CHLORHEXIDINE GLUCONATE ORAL RINSE 1.2 MG/ML
1 SOLUTION DENTAL
Refills: 0 | Status: DISCONTINUED | OUTPATIENT
Start: 2024-09-23 | End: 2024-09-24

## 2024-09-23 RX ORDER — TRAMADOL HYDROCHLORIDE 50 MG/1
50 TABLET, COATED ORAL EVERY 4 HOURS
Refills: 0 | Status: DISCONTINUED | OUTPATIENT
Start: 2024-09-23 | End: 2024-09-24

## 2024-09-23 RX ORDER — OXYCODONE HYDROCHLORIDE 30 MG/1
5 TABLET, FILM COATED, EXTENDED RELEASE ORAL THREE TIMES A DAY
Refills: 0 | Status: DISCONTINUED | OUTPATIENT
Start: 2024-09-23 | End: 2024-09-24

## 2024-09-23 RX ORDER — CELECOXIB 200 MG/1
400 CAPSULE ORAL ONCE
Refills: 0 | Status: COMPLETED | OUTPATIENT
Start: 2024-09-23 | End: 2024-09-23

## 2024-09-23 RX ADMIN — Medication 1000 MILLIGRAM(S): at 15:48

## 2024-09-23 RX ADMIN — Medication 2 TABLET(S): at 21:20

## 2024-09-23 RX ADMIN — Medication 650 MILLIGRAM(S): at 23:06

## 2024-09-23 RX ADMIN — Medication 75 MILLILITER(S): at 23:07

## 2024-09-23 RX ADMIN — ATORVASTATIN CALCIUM 10 MILLIGRAM(S): 10 TABLET, FILM COATED ORAL at 21:20

## 2024-09-23 RX ADMIN — Medication 1000 MILLIGRAM(S): at 15:37

## 2024-09-23 RX ADMIN — Medication 17 GRAM(S): at 21:21

## 2024-09-23 RX ADMIN — CELECOXIB 400 MILLIGRAM(S): 200 CAPSULE ORAL at 15:48

## 2024-09-23 RX ADMIN — KETOROLAC TROMETHAMINE 15 MILLIGRAM(S): 10 TABLET, FILM COATED ORAL at 23:06

## 2024-09-23 RX ADMIN — CELECOXIB 400 MILLIGRAM(S): 200 CAPSULE ORAL at 15:37

## 2024-09-23 RX ADMIN — FOLIC ACID 1 MILLIGRAM(S): 1 TABLET ORAL at 21:20

## 2024-09-23 NOTE — ASU PATIENT PROFILE, ADULT - FALL HARM RISK - HARM RISK INTERVENTIONS

## 2024-09-23 NOTE — PATIENT PROFILE ADULT - FALL HARM RISK - HARM RISK INTERVENTIONS
Assistance with ambulation/Assistance OOB with selected safe patient handling equipment/Communicate Risk of Fall with Harm to all staff/Discuss with provider need for PT consult/Monitor gait and stability/Provide patient with walking aids - walker, cane, crutches/Reinforce activity limits and safety measures with patient and family/Sit up slowly, dangle for a short time, stand at bedside before walking/Tailored Fall Risk Interventions/Use of alarms - bed, chair and/or voice tab/Visual Cue: Yellow wristband and red socks/Bed in lowest position, wheels locked, appropriate side rails in place/Call bell, personal items and telephone in reach/Instruct patient to call for assistance before getting out of bed or chair/Non-slip footwear when patient is out of bed/[place] to call system/Physically safe environment - no spills, clutter or unnecessary equipment/Purposeful Proactive Rounding/Room/bathroom lighting operational, light cord in reach

## 2024-09-23 NOTE — ASU PATIENT PROFILE, ADULT - NSICDXPASTMEDICALHX_GEN_ALL_CORE_FT
PAST MEDICAL HISTORY:  Arthritis     COPD (chronic obstructive pulmonary disease)     Depressed     Ex-cigarette smoker     Gastroesophageal reflux disease with esophagitis     Hypercholesteremia     Hypertension     Hypothyroid     Obesity with body mass index (BMI) of 30.0 to 39.9     AMADEO (obstructive sleep apnea)     Overactive bladder

## 2024-09-24 ENCOUNTER — TRANSCRIPTION ENCOUNTER (OUTPATIENT)
Age: 64
End: 2024-09-24

## 2024-09-24 VITALS
RESPIRATION RATE: 16 BRPM | OXYGEN SATURATION: 97 % | DIASTOLIC BLOOD PRESSURE: 81 MMHG | TEMPERATURE: 98 F | HEART RATE: 67 BPM | SYSTOLIC BLOOD PRESSURE: 166 MMHG

## 2024-09-24 LAB
ANION GAP SERPL CALC-SCNC: 12 MMOL/L — SIGNIFICANT CHANGE UP (ref 7–14)
BUN SERPL-MCNC: 10 MG/DL — SIGNIFICANT CHANGE UP (ref 10–20)
CALCIUM SERPL-MCNC: 8.9 MG/DL — SIGNIFICANT CHANGE UP (ref 8.4–10.5)
CHLORIDE SERPL-SCNC: 102 MMOL/L — SIGNIFICANT CHANGE UP (ref 98–110)
CO2 SERPL-SCNC: 23 MMOL/L — SIGNIFICANT CHANGE UP (ref 17–32)
CREAT SERPL-MCNC: 0.6 MG/DL — LOW (ref 0.7–1.5)
EGFR: 101 ML/MIN/1.73M2 — SIGNIFICANT CHANGE UP
GLUCOSE SERPL-MCNC: 127 MG/DL — HIGH (ref 70–99)
HCT VFR BLD CALC: 30.8 % — LOW (ref 37–47)
HGB BLD-MCNC: 10.7 G/DL — LOW (ref 12–16)
MCHC RBC-ENTMCNC: 31.1 PG — HIGH (ref 27–31)
MCHC RBC-ENTMCNC: 34.7 G/DL — SIGNIFICANT CHANGE UP (ref 32–37)
MCV RBC AUTO: 89.5 FL — SIGNIFICANT CHANGE UP (ref 81–99)
NRBC # BLD: 0 /100 WBCS — SIGNIFICANT CHANGE UP (ref 0–0)
PLATELET # BLD AUTO: 226 K/UL — SIGNIFICANT CHANGE UP (ref 130–400)
PMV BLD: 9.4 FL — SIGNIFICANT CHANGE UP (ref 7.4–10.4)
POTASSIUM SERPL-MCNC: 4.6 MMOL/L — SIGNIFICANT CHANGE UP (ref 3.5–5)
POTASSIUM SERPL-SCNC: 4.6 MMOL/L — SIGNIFICANT CHANGE UP (ref 3.5–5)
RBC # BLD: 3.44 M/UL — LOW (ref 4.2–5.4)
RBC # FLD: 11.9 % — SIGNIFICANT CHANGE UP (ref 11.5–14.5)
SODIUM SERPL-SCNC: 137 MMOL/L — SIGNIFICANT CHANGE UP (ref 135–146)
WBC # BLD: 13.81 K/UL — HIGH (ref 4.8–10.8)
WBC # FLD AUTO: 13.81 K/UL — HIGH (ref 4.8–10.8)

## 2024-09-24 PROCEDURE — 99222 1ST HOSP IP/OBS MODERATE 55: CPT

## 2024-09-24 RX ORDER — ACETAMINOPHEN 325 MG
2 TABLET ORAL
Qty: 112 | Refills: 0
Start: 2024-09-24 | End: 2024-10-07

## 2024-09-24 RX ORDER — NALOXONE HYDROCHLORIDE 0.4 MG/ML
1 INJECTION, SOLUTION INTRAMUSCULAR; INTRAVENOUS; SUBCUTANEOUS
Qty: 1 | Refills: 0
Start: 2024-09-24

## 2024-09-24 RX ORDER — OXYCODONE HYDROCHLORIDE 30 MG/1
1 TABLET, FILM COATED, EXTENDED RELEASE ORAL
Qty: 9 | Refills: 0
Start: 2024-09-24 | End: 2024-09-26

## 2024-09-24 RX ORDER — ASPIRIN 325 MG
1 TABLET ORAL
Qty: 60 | Refills: 0
Start: 2024-09-24 | End: 2024-10-23

## 2024-09-24 RX ORDER — PANTOPRAZOLE SODIUM 40 MG/1
1 TABLET, DELAYED RELEASE ORAL
Qty: 30 | Refills: 0
Start: 2024-09-24 | End: 2024-10-23

## 2024-09-24 RX ORDER — SENNOSIDES 8.6 MG
2 TABLET ORAL
Qty: 30 | Refills: 0
Start: 2024-09-24

## 2024-09-24 RX ORDER — TRAMADOL HYDROCHLORIDE 50 MG/1
1 TABLET, COATED ORAL
Qty: 42 | Refills: 0
Start: 2024-09-24 | End: 2024-09-30

## 2024-09-24 RX ORDER — CALCIUM CARBONATE 500(1250)
0 TABLET ORAL
Refills: 0 | DISCHARGE

## 2024-09-24 RX ORDER — OXYCODONE HYDROCHLORIDE 30 MG/1
0 TABLET, FILM COATED, EXTENDED RELEASE ORAL
Refills: 0 | DISCHARGE

## 2024-09-24 RX ADMIN — Medication 650 MILLIGRAM(S): at 06:15

## 2024-09-24 RX ADMIN — Medication 650 MILLIGRAM(S): at 00:29

## 2024-09-24 RX ADMIN — TRAMADOL HYDROCHLORIDE 50 MILLIGRAM(S): 50 TABLET, COATED ORAL at 05:10

## 2024-09-24 RX ADMIN — OXYCODONE HYDROCHLORIDE 5 MILLIGRAM(S): 30 TABLET, FILM COATED, EXTENDED RELEASE ORAL at 09:20

## 2024-09-24 RX ADMIN — KETOROLAC TROMETHAMINE 15 MILLIGRAM(S): 10 TABLET, FILM COATED ORAL at 06:15

## 2024-09-24 RX ADMIN — Medication 100 MILLIGRAM(S): at 09:38

## 2024-09-24 RX ADMIN — KETOROLAC TROMETHAMINE 15 MILLIGRAM(S): 10 TABLET, FILM COATED ORAL at 10:49

## 2024-09-24 RX ADMIN — Medication 100 MILLIGRAM(S): at 02:07

## 2024-09-24 RX ADMIN — Medication 650 MILLIGRAM(S): at 05:33

## 2024-09-24 RX ADMIN — PANTOPRAZOLE SODIUM 40 MILLIGRAM(S): 40 TABLET, DELAYED RELEASE ORAL at 05:32

## 2024-09-24 RX ADMIN — Medication 600 MILLIGRAM(S): at 09:20

## 2024-09-24 RX ADMIN — Medication 650 MILLIGRAM(S): at 13:44

## 2024-09-24 RX ADMIN — Medication 81 MILLIGRAM(S): at 05:33

## 2024-09-24 RX ADMIN — SERTRALINE HYDROCHLORIDE 100 MILLIGRAM(S): 100 TABLET, FILM COATED ORAL at 13:45

## 2024-09-24 RX ADMIN — KETOROLAC TROMETHAMINE 15 MILLIGRAM(S): 10 TABLET, FILM COATED ORAL at 00:29

## 2024-09-24 RX ADMIN — TRAMADOL HYDROCHLORIDE 50 MILLIGRAM(S): 50 TABLET, COATED ORAL at 04:38

## 2024-09-24 RX ADMIN — LOSARTAN POTASSIUM 100 MILLIGRAM(S): 100 TABLET, FILM COATED ORAL at 05:32

## 2024-09-24 RX ADMIN — CHLORHEXIDINE GLUCONATE ORAL RINSE 1 APPLICATION(S): 1.2 SOLUTION DENTAL at 05:34

## 2024-09-24 RX ADMIN — KETOROLAC TROMETHAMINE 15 MILLIGRAM(S): 10 TABLET, FILM COATED ORAL at 16:18

## 2024-09-24 RX ADMIN — OXYCODONE HYDROCHLORIDE 5 MILLIGRAM(S): 30 TABLET, FILM COATED, EXTENDED RELEASE ORAL at 02:07

## 2024-09-24 RX ADMIN — OXYCODONE HYDROCHLORIDE 5 MILLIGRAM(S): 30 TABLET, FILM COATED, EXTENDED RELEASE ORAL at 04:04

## 2024-09-24 RX ADMIN — KETOROLAC TROMETHAMINE 15 MILLIGRAM(S): 10 TABLET, FILM COATED ORAL at 05:32

## 2024-09-24 RX ADMIN — OXYCODONE HYDROCHLORIDE 5 MILLIGRAM(S): 30 TABLET, FILM COATED, EXTENDED RELEASE ORAL at 08:49

## 2024-09-24 RX ADMIN — Medication 600 MILLIGRAM(S): at 13:45

## 2024-09-24 RX ADMIN — Medication 600 MILLIGRAM(S): at 08:39

## 2024-09-24 RX ADMIN — Medication 150 MILLIGRAM(S): at 13:45

## 2024-09-24 RX ADMIN — Medication 600 MILLIGRAM(S): at 16:02

## 2024-09-24 RX ADMIN — KETOROLAC TROMETHAMINE 15 MILLIGRAM(S): 10 TABLET, FILM COATED ORAL at 09:38

## 2024-09-24 RX ADMIN — MULTI VITAMIN/MINERAL SUPPLEMENT WITH ASCORBIC ACID, NIACIN, PYRIDOXINE, PANTOTHENIC ACID, FOLIC ACID, RIBOFLAVIN, THIAMIN, BIOTIN, COBALAMIN AND ZINC. 1 TABLET(S): 60; 20; 12.5; 10; 10; 1.7; 1.5; 1; .3; .006 TABLET, COATED ORAL at 13:45

## 2024-09-24 RX ADMIN — OXYCODONE HYDROCHLORIDE 5 MILLIGRAM(S): 30 TABLET, FILM COATED, EXTENDED RELEASE ORAL at 16:18

## 2024-09-24 RX ADMIN — Medication 650 MILLIGRAM(S): at 16:02

## 2024-09-24 NOTE — DISCHARGE NOTE PROVIDER - NSDCFUSCHEDAPPT_GEN_ALL_CORE_FT
Unknown, Doctor  [facility] PreAdmits  Scheduled Appointment: 10/03/2024    [facility] Physician [facility]  [address]  Scheduled Appointment: 10/03/2024    Bhavya-Bobby Tyson  [facility]  [address]  Scheduled Appointment: 10/17/2024

## 2024-09-24 NOTE — DISCHARGE NOTE PROVIDER - CARE PROVIDER_API CALL
Bhavya-Bobby Tyson  Orthopaedic Surgery  [address] [place] [address]  Phone: [phone]  Fax: [phone]  Scheduled Appointment: 10/17/2024 02:30 PM

## 2024-09-24 NOTE — DISCHARGE NOTE NURSING/CASE MANAGEMENT/SOCIAL WORK - NSDCVIVACCINE_GEN_ALL_CORE_FT
Tdap; 10-May-2023 01:58; Samson Guajardo (RN); [employer]; [id number] (Exp. Date: 15-Feb-2025); IntraMuscular; Deltoid Right.; 0.5 milliLiter(s); VIS (VIS Published: 09-May-2013, VIS Presented: 10-May-2023);

## 2024-09-24 NOTE — PHYSICAL THERAPY INITIAL EVALUATION ADULT - IMPAIRMENTS CONTRIBUTING IMPAIRED BED MOBILITY, REHAB EVAL
Pt was seen in the pre-op center today. Pre-op instructions given including NPO after MN, medications to take/hold the morning of surgery, arrival procedure and location, shower with antibacterial soap; anesthesia type discussed and pre-op assessment completed. Pts questions answered and concerns addressed. Pt verbalized understanding.     impaired balance/pain/decreased ROM/decreased strength

## 2024-09-24 NOTE — OCCUPATIONAL THERAPY INITIAL EVALUATION ADULT - GENERAL OBSERVATIONS, REHAB EVAL
11:30-12:00; chart reviewed, ok to treat by Occupational Therapist as confirmed by RN Niki, Pt received seated in bedside chair +heplock LUE +ace wrap (L) knee in NAD. Pt reported 7/10 (L) knee pain; RN aware. Pt in agreement with OT IE.

## 2024-09-24 NOTE — PROGRESS NOTE ADULT - SUBJECTIVE AND OBJECTIVE BOX
63y Female POD #  1    S/P left Total Knee Arthroplasty     Patient seen and examined at bedside . The patient is awake and alert in NAD. No complaints of chest pain, SOB, N/V.  The patient complains of increased pain, added ibuprofen, discussed the pain management protocol, importance of multimodal pain therapy and use of ice.     PAST MEDICAL & SURGICAL HISTORY:  Hypertension    Hypothyroid    Depressed    Hypercholesteremia    Gastroesophageal reflux disease with esophagitis    Overactive bladder    AMADEO (obstructive sleep apnea)    Arthritis    COPD (chronic obstructive pulmonary disease)    Ex-cigarette smoker    Obesity with body mass index (BMI) of 30.0 to 39.9    Admission for tubal ligation    H/O breast augmentation    H/O plastic surgery  abdominal plasty    H/O right inguinal hernia repair  2014    H/O arthroscopy of left knee  20 yrs ago    H/O gastric bypass  2019    History of umbilical hernia repair    H/O hernia repair  abdominal 07/2022    History of bladder surgery  "with botox that failed"    S/P excision of lipoma          MEDICATIONS  (STANDING):  acetaminophen     Tablet .. 650 milliGRAM(s) Oral every 6 hours  aspirin enteric coated 81 milliGRAM(s) Oral two times a day  atorvastatin 10 milliGRAM(s) Oral at bedtime  buPROPion XL (24-Hour) . 150 milliGRAM(s) Oral daily  ceFAZolin   IVPB 2000 milliGRAM(s) IV Intermittent every 8 hours  chlorhexidine 2% Cloths 1 Application(s) Topical <User Schedule>  folic acid 1 milliGRAM(s) Oral at bedtime  ibuprofen  Tablet. 600 milliGRAM(s) Oral every 8 hours  ketorolac   Injectable 15 milliGRAM(s) IV Push every 6 hours  losartan 100 milliGRAM(s) Oral daily  multivitamin 1 Tablet(s) Oral daily  pantoprazole    Tablet 40 milliGRAM(s) Oral before breakfast  polyethylene glycol 3350 17 Gram(s) Oral at bedtime  senna 2 Tablet(s) Oral at bedtime  sertraline 100 milliGRAM(s) Oral daily  sodium chloride 0.9%. 1000 milliLiter(s) (75 mL/Hr) IV Continuous <Continuous>    MEDICATIONS  (PRN):  magnesium hydroxide Suspension 30 milliLiter(s) Oral daily PRN Constipation  ondansetron Injectable 4 milliGRAM(s) IV Push every 6 hours PRN Nausea and/or Vomiting  oxyCODONE    IR 5 milliGRAM(s) Oral three times a day PRN breakthrough pain  traMADol 50 milliGRAM(s) Oral every 4 hours PRN Severe Pain (7 - 10)        Vital Signs Last 24 Hrs  T(C): 36.7 (24 Sep 2024 04:18), Max: 36.8 (23 Sep 2024 15:20)  T(F): 98.1 (24 Sep 2024 04:18), Max: 98.3 (23 Sep 2024 15:20)  HR: 68 (24 Sep 2024 04:18) (52 - 72)  BP: 156/77 (24 Sep 2024 04:18) (118/74 - 188/90)  BP(mean): --  RR: 16 (24 Sep 2024 04:18) (14 - 22)  SpO2: 99% (24 Sep 2024 04:18) (97% - 99%)                          10.7   13.81 )-----------( 226      ( 24 Sep 2024 07:47 )             30.8                     PE:  The patient was seen and examined at bedside          A&OX3, NAD          left  dressing with ss drainage, new dsg applied          Compartments soft, BLE Phillip stockings and SCD in place          NVI, SILT           A/P:     # POD # 1      s/p left Total Knee Arthroplasty                 - OOB to Chair   -PT/OT - wbat  -post op abx   -Pain control - per pain protocol   -Incentive Spirometry   -DVT Prophylaxis - aspirin bid x 30 days   -GI ppx- continue Protonix   -f/u am labs   -discharge planning- home with home care

## 2024-09-24 NOTE — OCCUPATIONAL THERAPY INITIAL EVALUATION ADULT - LEVEL OF INDEPENDENCE: TUB, REHAB EVAL
Not performed secondary pt reported 7/10 (L) knee pain. Pt advised to sponge bathe and to practice tub transfer with home therapist prior to performing independently to increase safety. Pt demonstrated good understanding and in agreement.

## 2024-09-24 NOTE — DISCHARGE NOTE PROVIDER - HOSPITAL COURSE
63 year old female admitted for an elective Total Knee Arthroplasty. The patient tolerated surgery well with no intra/post operative complications. The patient received intra/post operative antibiotics for infection prophylaxis and will be discharged on Aspirin 81mg twice daily for 30 days to lower the risk of blood clots. The patient worked with Physical Therapy while admitted to the hospital and is stable for discharge.

## 2024-09-24 NOTE — DISCHARGE NOTE PROVIDER - NSDCCPCAREPLAN_GEN_ALL_CORE_FT
PRINCIPAL DISCHARGE DIAGNOSIS  Diagnosis: Arthritis of left knee  Assessment and Plan of Treatment: 63 year old female admitted for an elective Total Knee Arthroplasty. The patient tolerated surgery well with no intra/post operative complications. The patient received intra/post operative antibiotics for infection prophylaxis and will be discharged on Aspirin 81mg twice daily for 30 days to lower the risk of blood clots. The patient worked with Physical Therapy while admitted to the hospital and is stable for discharge.   Keep surgical site clean and dry, may remove dressing in 6  days . Call your surgeon if any wound drainage, redness , increasing pain, fevers over 101 or if you have any questions or concerns.  Ice pack to affected area q4-6h as needed   You may shower with the bandage on and once it is removed. Once it is removed  , do not scrub surgical site. Do not apply any lotions/moisturizers/creams to surgical site.  Take aspirin 81 mg twice daily for 30 days to lower the risk of blood clots.  Call to make your  post op appointment if you do not have one already.

## 2024-09-24 NOTE — CONSULT NOTE ADULT - ASSESSMENT
Pre-Op Diagnosis	PRE-OP DIAGNOSIS:  Osteoarthritis of left knee 23-Sep-2024 20:32:41  Xi Land  Post-Op Diagnosis	POST-OP DIAGNOSIS:  Osteoarthritis of left knee 23-Sep-2024 20:32:57  Xi Land  Procedure	PROCEDURES:  Total left knee replacement 23-Sep-2024 20:32:27  Xi Land  Operative Findings	degenerative changes  Specimens	none  Estimated Blood Loss	100 milliLiter(s)  Antibiotic Protocol	Followed protocol  Venous Thromboembolism Prophylaxis Therapy	ICD  POD#1  pain control   DVT prophyaxsis asa q12   PT/OT  IS  Bowel Regimen  Ortho follow up  discussed with ortho team     # HTn - bp stable - cont home meds - cozaar  # hypothyroidism on synthroid  # depression on wellbutrin /sertraline  # OAB - voiding well  #COPd - no exacerbation     stable to dc with outpt follow up   recall prn

## 2024-09-24 NOTE — DISCHARGE NOTE NURSING/CASE MANAGEMENT/SOCIAL WORK - NSDCPEFALRISK_GEN_ALL_CORE
For information on Fall & Injury Prevention, visit: [email or web address] OR  [email or web address] OR  https://www.cdc.gov/steadi/patient.html

## 2024-09-24 NOTE — CHART NOTE - NSCHARTNOTEFT_GEN_A_CORE
PACU ANESTHESIA ADMISSION NOTE      Procedure: left total knee replacement    ____  Intubated  TV:______       Rate: ______      FiO2: ______    ___x_  Patent Airway    __x__  Full return of protective reflexes    __x__  Full recovery from anesthesia / back to baseline     Vitals:   T:    96.9    R:   12             BP:   118/74             Sat:        96        P: 56      Mental Status:  ___x_ Awake   _____ Alert   _____ Drowsy   _____ Sedated    Nausea/Vomiting:  __x__ NO  ______Yes,   See Post - Op Orders          Pain Scale (0-10):  _0____    Treatment: ____ None    ____ See Post - Op/PCA Orders    Post - Operative Fluids:   ____ Oral   ___x_ See Post - Op Orders    Plan: Discharge:   __Home       ___x__Floor     _____Critical Care    _____  Other:_________________    Comments:
The patient is post op from a total knee replacement and requires a 3 in 1 commode as they have no access to a bathroom on the floor they will be residing. The patient requires a rolling walker due to a mobility deficient that can be resolved with the use of a walker. The patient is able to safely use the walker.

## 2024-09-24 NOTE — DISCHARGE NOTE NURSING/CASE MANAGEMENT/SOCIAL WORK - PATIENT PORTAL LINK FT
You can access the FollowMyHealth Patient Portal offered by [facility] by registering at the following website: [email or web address]. By joining [facility]’s FollowMyHealth portal, you will also be able to view your health information using other applications (apps) compatible with our system.

## 2024-09-24 NOTE — PHYSICAL THERAPY INITIAL EVALUATION ADULT - ADDITIONAL COMMENTS
pt lives with her grandson in an apart building with elevator and ramp to enter.  Pt was able to amb with WC prior to admission, pt was able to drive a car prior to admission

## 2024-09-24 NOTE — DISCHARGE NOTE PROVIDER - NSDCMRMEDTOKEN_GEN_ALL_CORE_FT
acetaminophen 325 mg oral tablet: 2 tab(s) orally every 6 hours post op pain  aspirin 81 mg oral delayed release tablet: 1 tab(s) orally 2 times a day for 30 days after Total Knee Arthroplasty to lower the risk of DVT  Calcium D 600-400mg 1 tab daily:   Cozaar 100 mg oral tablet: 1 tab(s) orally once a day  folic acid 1 mg oral tablet: 1 tab(s) orally once a day (at bedtime)  ibuprofen 600 mg oral tablet: 1 tab(s) orally every 8 hours post op pain  multivitamin: 1 orally once a day  naloxone 4 mg/0.1 mL nasal spray: 1 spray(s) intranasally once as needed for od take as directed in the event of accidental overdose  oxyCODONE 5 mg oral tablet: 1 tab(s) orally 3 times a day as needed for breakthrough pain MDD: 3  pantoprazole 40 mg oral delayed release tablet: 1 tab(s) orally once a day (before a meal) GI prophylaxis for 30 days after Total Knee Arthroplasty  polyethylene glycol 3350 oral powder for reconstitution: 17 gram(s) orally once a day (at bedtime) as needed for  constipation  pravastatin 20 mg oral tablet: 1 tab(s) orally  senna leaf extract oral tablet: 2 tab(s) orally once a day (at bedtime) while on pain medication to prevent constipation  sertraline 100 mg oral tablet: 1 tab(s) orally once a day (in the afternoon)  traMADol 50 mg oral tablet: 1 tab(s) orally every 4 hours as needed for Severe Pain (7 - 10) MDD: 6  Wellbutrin 100 mg oral tablet: 1 tab(s) orally once a day

## 2024-09-24 NOTE — CONSULT NOTE ADULT - SUBJECTIVE AND OBJECTIVE BOX
SLIME COHEN  63y, Female  Allergy: erythromycin (Stomach Upset; Diarrhea; Flatulence)      CHIEF COMPLAINT: Total Knee Arthroplasty (24 Sep 2024 08:36)      HPI:    HPI:    FAMILY HISTORY:  CAD (coronary artery disease) (Mother)    Cancer (Mother)  breast    Family history of diabetes mellitus (DM) (Mother)      PAST MEDICAL & SURGICAL HISTORY:  Hypertension      Hypothyroid      Depressed      Hypercholesteremia      Gastroesophageal reflux disease with esophagitis      Overactive bladder      AMADEO (obstructive sleep apnea)      Arthritis      COPD (chronic obstructive pulmonary disease)      Ex-cigarette smoker      Obesity with body mass index (BMI) of 30.0 to 39.9      Admission for tubal ligation      H/O breast augmentation      H/O plastic surgery  abdominal plasty      H/O right inguinal hernia repair  2014      H/O arthroscopy of left knee  20 yrs ago      H/O gastric bypass  2019      History of umbilical hernia repair      H/O hernia repair  abdominal 07/2022      History of bladder surgery  "with botox that failed"      S/P excision of lipoma          SOCIAL HISTORY  Social History:      Home Medications:  calcium carbonate:  (23 Sep 2024 15:28)  Calcium D 600-400mg 1 tab daily:  (23 Sep 2024 15:28)  Cozaar 100 mg oral tablet: 1 tab(s) orally once a day (23 Sep 2024 15:28)  folic acid 1 mg oral tablet: 1 tab(s) orally once a day (at bedtime) (23 Sep 2024 15:28)  multivitamin: 1 orally once a day (23 Sep 2024 15:28)  OXYCODONE HYDROCHLORIDE:  (23 Sep 2024 20:22)  pravastatin 20 mg oral tablet: 1 tab(s) orally (23 Sep 2024 15:28)  sertraline 100 mg oral tablet: 1 tab(s) orally once a day (in the afternoon) (23 Sep 2024 15:28)  Wellbutrin 100 mg oral tablet: 1 tab(s) orally once a day (23 Sep 2024 15:28)      ROS  General: Denies fevers, chills, nightsweats, weight loss  HEENT: Denies headache, rhinorrhea, sore throat, eye pain  CV: Denies CP, palpitations  PULM: Denies SOB, cough  GI: Denies abdominal pain, diarrhea  : Denies dysuria, hematuria  MSK: Denies arthralgias +post op pain   SKIN: Denies rash     VITALS:  T(F): 98, Max: 98.3 (09-23-24 @ 15:20)  HR: 67  BP: 151/88  RR: 16Vital Signs Last 24 Hrs  T(C): 36.7 (24 Sep 2024 08:51), Max: 36.8 (23 Sep 2024 15:20)  T(F): 98 (24 Sep 2024 08:51), Max: 98.3 (23 Sep 2024 15:20)  HR: 67 (24 Sep 2024 08:51) (52 - 72)  BP: 151/88 (24 Sep 2024 08:51) (118/74 - 188/90)  BP(mean): --  RR: 16 (24 Sep 2024 08:51) (14 - 22)  SpO2: 99% (24 Sep 2024 04:18) (97% - 99%)    Parameters below as of 24 Sep 2024 04:18  Patient On (Oxygen Delivery Method): room air        PHYSICAL EXAM:  Gen: NAD, resting in bed  HEENT: Normocephalic, atraumatic  Neck: supple, no lymphadenopathy  CV: Regular rate & regular rhythm  Lungs: CTABL no wheeze  Abdomen: Soft, NTND+ BS present  Ext: Warm, well perfused no CCE left knee derssing intact   Neuro: non focal, awake, CN II-XII intact        TESTS & MEASUREMENTS:                        10.7   13.81 )-----------( 226      ( 24 Sep 2024 07:47 )             30.8     09-24    137  |  102  |  10  ----------------------------<  127[H]  4.6   |  23  |  0.6[L]    Ca    8.9      24 Sep 2024 07:47          Urinalysis Basic - ( 24 Sep 2024 07:47 )    Color: x / Appearance: x / SG: x / pH: x  Gluc: 127 mg/dL / Ketone: x  / Bili: x / Urobili: x   Blood: x / Protein: x / Nitrite: x   Leuk Esterase: x / RBC: x / WBC x   Sq Epi: x / Non Sq Epi: x / Bacteria: x            QRS axis to [] ° and NSR at a rate of [] BPM. There was no atrial enlargement. There was no ventricular hypertrophy. There were no ST-T changes and all intervals were normal.      INFECTIOUS DISEASES TESTING  MRSA PCR Result.: Negative (09-04-24 @ 19:42)      RADIOLOGY & ADDITIONAL TESTS:  I have personally reviewed the last Chest xray  CXR      CT      CARDIOLOGY TESTING      MEDICATIONS  (STANDING):  acetaminophen     Tablet .. 650 milliGRAM(s) Oral every 6 hours  aspirin enteric coated 81 milliGRAM(s) Oral two times a day  atorvastatin 10 milliGRAM(s) Oral at bedtime  buPROPion XL (24-Hour) . 150 milliGRAM(s) Oral daily  chlorhexidine 2% Cloths 1 Application(s) Topical <User Schedule>  folic acid 1 milliGRAM(s) Oral at bedtime  ibuprofen  Tablet. 600 milliGRAM(s) Oral every 8 hours  ketorolac   Injectable 15 milliGRAM(s) IV Push every 6 hours  losartan 100 milliGRAM(s) Oral daily  multivitamin 1 Tablet(s) Oral daily  pantoprazole    Tablet 40 milliGRAM(s) Oral before breakfast  polyethylene glycol 3350 17 Gram(s) Oral at bedtime  senna 2 Tablet(s) Oral at bedtime  sertraline 100 milliGRAM(s) Oral daily  sodium chloride 0.9%. 1000 milliLiter(s) (75 mL/Hr) IV Continuous <Continuous>    MEDICATIONS  (PRN):  magnesium hydroxide Suspension 30 milliLiter(s) Oral daily PRN Constipation  ondansetron Injectable 4 milliGRAM(s) IV Push every 6 hours PRN Nausea and/or Vomiting  oxyCODONE    IR 5 milliGRAM(s) Oral three times a day PRN breakthrough pain  traMADol 50 milliGRAM(s) Oral every 4 hours PRN Severe Pain (7 - 10)      ANTIBIOTICS:      All available historical data has been reviewed    ASSESSMENT  63y F admitted with Primary osteoarthritis of left knee        PROBLEMS

## 2024-09-26 LAB — SURGICAL PATHOLOGY STUDY: SIGNIFICANT CHANGE UP

## 2024-09-30 DIAGNOSIS — F32.A DEPRESSION, UNSPECIFIED: ICD-10-CM

## 2024-09-30 DIAGNOSIS — Z88.1 ALLERGY STATUS TO OTHER ANTIBIOTIC AGENTS: ICD-10-CM

## 2024-09-30 DIAGNOSIS — E66.9 OBESITY, UNSPECIFIED: ICD-10-CM

## 2024-09-30 DIAGNOSIS — Z87.891 PERSONAL HISTORY OF NICOTINE DEPENDENCE: ICD-10-CM

## 2024-09-30 DIAGNOSIS — I10 ESSENTIAL (PRIMARY) HYPERTENSION: ICD-10-CM

## 2024-09-30 DIAGNOSIS — K21.9 GASTRO-ESOPHAGEAL REFLUX DISEASE WITHOUT ESOPHAGITIS: ICD-10-CM

## 2024-09-30 DIAGNOSIS — J44.9 CHRONIC OBSTRUCTIVE PULMONARY DISEASE, UNSPECIFIED: ICD-10-CM

## 2024-09-30 DIAGNOSIS — E78.00 PURE HYPERCHOLESTEROLEMIA, UNSPECIFIED: ICD-10-CM

## 2024-09-30 DIAGNOSIS — M17.12 UNILATERAL PRIMARY OSTEOARTHRITIS, LEFT KNEE: ICD-10-CM

## 2024-09-30 DIAGNOSIS — Z98.84 BARIATRIC SURGERY STATUS: ICD-10-CM

## 2024-09-30 DIAGNOSIS — E03.9 HYPOTHYROIDISM, UNSPECIFIED: ICD-10-CM

## 2024-09-30 DIAGNOSIS — G47.33 OBSTRUCTIVE SLEEP APNEA (ADULT) (PEDIATRIC): ICD-10-CM

## 2024-09-30 DIAGNOSIS — N32.81 OVERACTIVE BLADDER: ICD-10-CM

## 2024-10-03 DIAGNOSIS — Z96.652 PRESENCE OF LEFT ARTIFICIAL KNEE JOINT: ICD-10-CM

## 2024-10-03 RX ORDER — OXYCODONE AND ACETAMINOPHEN 5; 325 MG/1; MG/1
5-325 TABLET ORAL
Qty: 10 | Refills: 0 | Status: ACTIVE | COMMUNITY
Start: 2024-10-03 | End: 1900-01-01

## 2024-10-09 NOTE — PATIENT PROFILE ADULT - SURGICAL SITE DESCRIPTION
Pulmonary Progress Note        NAME: Guy White - ROOM: [facility]A - MRN: [id number] - Age: 73 year old - : 1950        Last 24hrs: No events overnight. No new complaints.     OBJECTIVE:  Vitals:    10/08/24 2350 10/09/24 0140 10/09/24 0446 10/09/24 0748   BP: 128/60  141/61 121/72   BP Location: Right arm  Right arm Right arm   Pulse: 80 77 78 69   Resp: 18  16 18   Temp: 98.7 °F (37.1 °C)  97.8 °F (36.6 °C) 97.8 °F (36.6 °C)   TempSrc: Oral  Oral Oral   SpO2: 95% 94% 97% 97%   Weight:   153 lb 10.6 oz (69.7 kg)    Height:           Oxygen Therapy  SpO2: 97 %  O2 Device: None (Room air)  O2 Flow Rate (L/min): 0 L/min  Pulse Oximetry Type: Continuous  Oximetry Probe Site Changed: No  Pulse Ox Probe Location: Left hand                  Intake/Output Summary (Last 24 hours) at 10/9/2024 0852  Last data filed at 10/9/2024 0748  Gross per 24 hour   Intake 796 ml   Output --   Net 796 ml       Scheduled Medication:   piperacillin-tazobactam  3.375 g Intravenous Q8H    enoxaparin  40 mg Subcutaneous Nightly    allopurinol  100 mg Oral Daily    atorvastatin  10 mg Oral After dinner    metoprolol succinate ER  50 mg Oral Daily Beta Blocker    lidocaine-menthol  1 patch Transdermal Daily     Continuous Infusing Medication:    Lungs: diminished breath sounds base - right  Heart: S1, S2 normal, no murmur, click, rub or gallop, regular rate and rhythm  Abdomen: soft, non-tender; bowel sounds normal; no masses,  no organomegaly  Extremities: extremities normal, atraumatic, no cyanosis or edema    Labs reviewed as noted below      Imaging: CT chest from 10/4 and 10/7 reviewed    ASSESSMENT/PLAN:    Dyspnea - secondary to new R pleural effusion +/- pneumonia (PCT elevated).   Loculated Pleural Effusion  -Right sided thoracentesis was done 10/3, 1500cc fluid removed and again 10/7 with ~300cc removed. Fluid is exudative.   -needs MIST2/Chest tube discussed w/ pt and he is in agreement  -resend culture for cyto and  culture  PNA vs lung mass  -evidence of cavitation in RUL  -stop ceftriaxone (10/2-10/8 ), start zosyn given worsening cavitation. Completed course of azithromycin  -monitor cultures  -if no improvement w/ abx and mist 2 would favor Bronch w/ BAL for additional sampling  MDS - pancytopenic on labs  -per [facility]  Proph  -[facility]  Dispo -full code  -inpatient    abd hernia repair

## 2024-10-17 ENCOUNTER — APPOINTMENT (OUTPATIENT)
Facility: CLINIC | Age: 64
End: 2024-10-17
Payer: MEDICARE

## 2024-10-17 DIAGNOSIS — Z96.652 PRESENCE OF LEFT ARTIFICIAL KNEE JOINT: ICD-10-CM

## 2024-10-17 PROCEDURE — 73560 X-RAY EXAM OF KNEE 1 OR 2: CPT | Mod: 50

## 2024-10-17 PROCEDURE — 99024 POSTOP FOLLOW-UP VISIT: CPT

## 2024-10-22 ENCOUNTER — NON-APPOINTMENT (OUTPATIENT)
Age: 64
End: 2024-10-22

## 2024-10-28 NOTE — ASU PREOP CHECKLIST - WEIGHT IN LBS
Wheeze noted on exam  Denied SOB. She declined CXR  Rx Azithromycin, Prednisone  Albuterol  PCP follow up  Return precautions given   169.9

## 2024-11-19 NOTE — ED ADULT NURSE NOTE - NS ED NURSE LEVEL OF CONSCIOUSNESS SPEECH
Speaking Coherently
69-year-old female, history of hypertension, presenting with chief complaint of abdominal pain for 1 week, worse in the LLQ.   Vital signs stable, afebrile.  Exam significant for left lower quadrant tenderness to palpation, obtain CT imaging to evaluate for colitis versus appendicitis vs nephrolithiasis, gastritis is also a possible diagnosis.  Cholecystitis ruled out given that patient is status post cholecystectomy.  Will also obtain UA to eval for UTI.

## 2024-11-26 ENCOUNTER — APPOINTMENT (OUTPATIENT)
Dept: ORTHOPEDIC SURGERY | Facility: CLINIC | Age: 64
End: 2024-11-26

## 2024-11-30 PROBLEM — Z87.891 PERSONAL HISTORY OF NICOTINE DEPENDENCE: Chronic | Status: ACTIVE | Noted: 2024-09-04

## 2024-11-30 PROBLEM — E66.9 OBESITY, UNSPECIFIED: Chronic | Status: ACTIVE | Noted: 2024-09-19

## 2025-01-20 ENCOUNTER — EMERGENCY (EMERGENCY)
Facility: HOSPITAL | Age: 65
LOS: 0 days | Discharge: HOME | End: 2025-01-20
Attending: EMERGENCY MEDICINE
Payer: MEDICARE

## 2025-01-20 VITALS
OXYGEN SATURATION: 96 % | TEMPERATURE: 98 F | HEART RATE: 60 BPM | RESPIRATION RATE: 18 BRPM | HEIGHT: 65 IN | WEIGHT: 143.96 LBS | DIASTOLIC BLOOD PRESSURE: 97 MMHG | SYSTOLIC BLOOD PRESSURE: 177 MMHG

## 2025-01-20 DIAGNOSIS — Z98.890 OTHER SPECIFIED POSTPROCEDURAL STATES: Chronic | ICD-10-CM

## 2025-01-20 DIAGNOSIS — F32.A DEPRESSION, UNSPECIFIED: ICD-10-CM

## 2025-01-20 DIAGNOSIS — Z88.1 ALLERGY STATUS TO OTHER ANTIBIOTIC AGENTS: ICD-10-CM

## 2025-01-20 DIAGNOSIS — Z30.2 ENCOUNTER FOR STERILIZATION: Chronic | ICD-10-CM

## 2025-01-20 DIAGNOSIS — I10 ESSENTIAL (PRIMARY) HYPERTENSION: ICD-10-CM

## 2025-01-20 DIAGNOSIS — E03.9 HYPOTHYROIDISM, UNSPECIFIED: ICD-10-CM

## 2025-01-20 DIAGNOSIS — Z98.84 BARIATRIC SURGERY STATUS: Chronic | ICD-10-CM

## 2025-01-20 DIAGNOSIS — F11.10 OPIOID ABUSE, UNCOMPLICATED: ICD-10-CM

## 2025-01-20 DIAGNOSIS — Z98.82 BREAST IMPLANT STATUS: Chronic | ICD-10-CM

## 2025-01-20 PROCEDURE — 99282 EMERGENCY DEPT VISIT SF MDM: CPT

## 2025-01-20 PROCEDURE — 99283 EMERGENCY DEPT VISIT LOW MDM: CPT

## 2025-01-20 NOTE — ED PROVIDER NOTE - NS ED MD DISPO DISCHARGE
68 y/o M with ESRD on HD, recent cholangitis 7/2019 with stent placement and no sphincterotomy because pt was on plavix, had repeat ERCP a few days ago with sphincterotomy and balloon sweep; presented again with fever, elevated LFTs c/f cholangitis.     Had ERCP yesterday with minimal debris swept from duct. Potentially pt had transient obstruction after ERCP that has passed. LFTs significantly improved today and pt is no longer febrile.     Recommendations:  - can advance to regular diet   - cont abx   - can resume a/c  - please have pt f/u with Dr. Estes at [facility]

## 2025-01-20 NOTE — ED PROVIDER NOTE - PHYSICAL EXAMINATION
CONST: in NAD  EYES: EOMI, Sclera and conjunctiva clear.   NECK: Non-tender  CARD: Normal S1 S2; Normal rate and rhythm  RESP: Equal BS B/L, No wheezes, rhonchi or rales. No distress  MS: Normal ROM in all extremities.   SKIN: Warm, dry, Good turgor  NEURO: A&Ox3, No focal deficits. Strength 5/5 with no sensory deficits

## 2025-01-20 NOTE — ED PROVIDER NOTE - CLINICAL SUMMARY MEDICAL DECISION MAKING FREE TEXT BOX
Patient presented requesting detox from opiates.  Patient took 3 Percocet this morning.  No SI/HI/hallucinations or any other complaints.  On arrival, patient afebrile, hemodynamically stable, neurovascularly intact, no evidence of trauma on exam, no other complaints.  No evidence of withdrawal on exam.  Given the above, will provide outpatient detox information and discharge home with follow-up.  Patient agreeable with plan. Agrees to return to ED for any new or worsening symptoms.

## 2025-01-20 NOTE — ED PROVIDER NOTE - NSFOLLOWUPINSTRUCTIONS_ED_ALL_ED_FT
Our Emergency Department Referral Coordinators will be reaching out to you in the next 24-48 hours from 9:00am to 5:00pm to schedule a follow up appointment. Please expect a phone call from the hospital in that time frame. If you do not receive a call or if you have any questions or concerns, you can reach them at   [phone].    [address] [address]  [phone]    FOLLOW UP WITH ADDICTION MEDICINE  FOLLOW UP WITH YOUR PRIMARY DOCTOR  RETURN TO ED FOR NEW OR WORSENING SYMPTOMS    Opioid Use Disorder  Opioid use disorder is a condition in which opioids are used for reasons other than medical care. The person may use them even though taking them hurts the person's health and well-being. These drugs are powerful substances that relieve pain. Opioids include drugs such as heroin as well as prescription medicines for pain, such as:  Codeine.  Morphine.  Hydrocodone.  Oxycodone.  Fentanyl.  Taking prescribed opioids regularly can lead to dependence, especially if you take them in larger amounts or more often than they should be taken. Opioid use disorder can lead to problems with mental and physical health, including:  Depression or anxiety.  Severe constipation.  Malnutrition and weight loss.  Sleep problems.  Diseases caused by infections, such as hepatitis or HIV.  Sexual problems.  Opioid use disorder can be dangerous. It increases the risk of suicide and can lead to a life-threatening overdose.    What are the causes?  This condition is caused by taking opioids. Taking opioids again and again results in changes in the brain that make it hard to control opioid use. Many people develop this condition because they like the way they feel when they take opioids or because they get addicted to them.    What increases the risk?  This condition is more likely to develop in people who:  Have a family history of opioid use disorder.  Misuse other drugs.  Have a mental illness, such as depression, post-traumatic stress disorder, or antisocial personality disorder.  Begin use at an early age, such as during their teenage years.  What are the signs or symptoms?  Symptoms of this condition include:  Taking opioids in larger amounts or for longer periods than you want to.  Spending an abnormal amount of time getting opioids, using them, or recovering from their effects.  Craving opioids.  Using opioids in a way that interferes with work, school, social activities, and personal relationships.  Giving up or cutting down on important life activities because of opioid use.  Using opioids when it is dangerous, such as when driving a car.  Continuing to use the drug even after it has led to problems such as:  Physical or mental health problems.  Legal or financial troubles.  Job loss.  Broken relationships.  Being unable to slow down or stop your use of the drug.  Needing more and more of an opioid to get the same effect (building up a tolerance).  Experiencing unpleasant symptoms if you do not use the opioid (withdrawal). Some symptoms of withdrawal include:  Depression, anxiety, or feeling irritable.  Nausea or vomiting.  Muscle aches or spasms.  Watery eyes.  Trouble sleeping.  Yawning.  How is this diagnosed?  This condition is diagnosed based on:  A physical exam.  Your history of opioid use.  Your symptoms. This includes:  How opioid use affects your life.  Changes in personality, behaviors, and mood.  Having at least two symptoms of opioid use disorder within a 12-month period.  Health issues related to using opioids.  Blood or urine tests to screen for drugs.  How is this treated?  Person talking with a counselor.  The first goal of treatment is to stop your use of opioids. This must be done safely and may involve taking medicines to lessen withdrawal symptoms. Treatment may also involve:  Taking part in group and individual counseling from mental health providers who have experience with substance use disorder.  Staying at a residential treatment center for several days or weeks.  Attending daily counseling sessions at a treatment center.  Taking medicines as told by your health care provider that:  Ease symptoms and prevent complications during withdrawal.  Block cravings and block the good feeling that you get from using opioids.  Treat other mental health issues, such as depression or anxiety.  Reduce agitation.  Participating in a support group to share your experience with others who are going through the same thing.  Using opioid maintenance treatment. This involves taking certain kinds of opioid medicines. These medicines satisfy cravings but are safer than opioids that are commonly misused.  Recovery can be a long process. Some people who undergo treatment start using opioids again after stopping (relapse). If you relapse, it does not mean that treatment will not work.    Follow these instructions at home:  Medicines    Take over-the-counter and prescription medicines only as told by your health care provider.  Check with your health care provider before starting any new medicines, herbs, or supplements.  General instructions    Do not use any drugs or alcohol.  Avoid people and activities that trigger your use of opioids.  Learn and practice techniques for managing stress.  Have a plan for vulnerable moments. These are times when you are most likely to relapse. Get phone numbers of those who are willing to help and who are committed to your recovery.  Attend support groups regularly. These groups provide emotional support, advice, and guidance.  Keep all follow-up visits. This is important. Follow-up visits include continuing to work with therapists and support groups.  Where to find more information  National [place] on Drug Abuse: drugabuse.gov  Substance Abuse and Mental Health Services Administration: samhsa.gov  Narcotics Anonymous: na.org  Contact a health care provider if:  You cannot take your medicines as told.  Your symptoms get worse.  You have a relapse.  Get help right away if:  You may have taken too much of an opioid (overdosed). Common symptoms of an overdose include:  Sleepiness or difficulty waking from sleep.  Decrease in attention or confusion.  Slurred speech.  Slowed breathing and a slow pulse (bradycardia).  Nausea and vomiting.  Abnormally small pupils.  You have serious thoughts about hurting yourself or others.  These symptoms may represent a serious problem that is an emergency. Do not wait to see if the symptoms will go away. Get medical help right away. Call your local emergency services (829 in the [place] Do not drive yourself to the hospital.    If you were prescribed a drug (naloxone) that reverses the effects of an opioid overdose, a friend, family member, or emergency services provider can administer the drug in an emergency.    If you ever feel like you may hurt yourself or others, or have thoughts about taking your own life, get help right away. You can go to your nearest emergency department or call:  Your local emergency services (673 in the [place]  A suicide crisis helpline, such as the National Suicide Prevention Lifeline at 1-550.667.1151 or 847 in the [place] This is open 24 hours a day.  Summary  Opioid use disorder is a condition in which opioids are used for reasons other than medical care.  Opioid use disorder can be dangerous. It can lead to various mental and physical problems, and an opioid overdose can be life-threatening.  The first goal of treatment is to stop your use of opioids. This must be done safely and may involve taking medicines to lessen withdrawal symptoms.  This information is not intended to replace advice given to you by your health care provider. Make sure you discuss any questions you have with your health care provider.

## 2025-01-20 NOTE — ED PROVIDER NOTE - OBJECTIVE STATEMENT
Patient is a 64-year-old female PMH hypertension, hypothyroid, depression, substance abuse disorder coming to ED requesting opiate detox.  Patient reports going to [address] [place] [address] for detox and was told to come to ED for referral.  Patient reports taking "10-20 Percocet" daily, most recently took 3 Percocet this morning.  Denies fall/trauma, fever, cough, chest pain, abdominal, nausea vomiting diarrhea constipation

## 2025-01-20 NOTE — ED PROVIDER NOTE - PATIENT PORTAL LINK FT
You can access the FollowMyHealth Patient Portal offered by [facility] by registering at the following website: [email or web address]. By joining [facility]’s FollowMyHealth portal, you will also be able to view your health information using other applications (apps) compatible with our system.

## 2025-01-22 NOTE — CHART NOTE - NSCHARTNOTEFT_GEN_A_CORE
[facility] MRN [id number] / VM BOX FULL 1/21 & 1/22 - JL    SPECIALTY: addiction services

## 2025-02-25 ENCOUNTER — APPOINTMENT (OUTPATIENT)
Dept: ORTHOPEDIC SURGERY | Facility: CLINIC | Age: 65
End: 2025-02-25

## 2025-02-25 PROCEDURE — 99213 OFFICE O/P EST LOW 20 MIN: CPT

## 2025-03-13 ENCOUNTER — APPOINTMENT (OUTPATIENT)
Dept: PLASTIC SURGERY | Facility: CLINIC | Age: 65
End: 2025-03-13
Payer: MEDICARE

## 2025-03-13 VITALS — WEIGHT: 147 LBS | BODY MASS INDEX: 24.49 KG/M2 | HEIGHT: 65 IN

## 2025-03-13 DIAGNOSIS — Z98.82 BREAST IMPLANT STATUS: ICD-10-CM

## 2025-03-13 PROCEDURE — 99203 OFFICE O/P NEW LOW 30 MIN: CPT

## 2025-03-13 RX ORDER — LOSARTAN POTASSIUM 100 MG/1
TABLET, FILM COATED ORAL
Refills: 0 | Status: ACTIVE | COMMUNITY

## 2025-03-13 RX ORDER — BUPROPION HYDROCHLORIDE 100 MG/1
TABLET, FILM COATED ORAL
Refills: 0 | Status: ACTIVE | COMMUNITY

## 2025-03-19 ENCOUNTER — OUTPATIENT (OUTPATIENT)
Dept: OUTPATIENT SERVICES | Facility: HOSPITAL | Age: 65
LOS: 1 days | End: 2025-03-19
Payer: MEDICARE

## 2025-03-19 DIAGNOSIS — Z98.890 OTHER SPECIFIED POSTPROCEDURAL STATES: Chronic | ICD-10-CM

## 2025-03-19 DIAGNOSIS — Z30.2 ENCOUNTER FOR STERILIZATION: Chronic | ICD-10-CM

## 2025-03-19 DIAGNOSIS — Z98.82 BREAST IMPLANT STATUS: Chronic | ICD-10-CM

## 2025-03-19 DIAGNOSIS — N64.4 MASTODYNIA: ICD-10-CM

## 2025-03-19 DIAGNOSIS — Z98.84 BARIATRIC SURGERY STATUS: Chronic | ICD-10-CM

## 2025-03-19 PROCEDURE — 77066 DX MAMMO INCL CAD BI: CPT | Mod: 26

## 2025-03-19 PROCEDURE — 76641 ULTRASOUND BREAST COMPLETE: CPT | Mod: 50

## 2025-03-19 PROCEDURE — 76641 ULTRASOUND BREAST COMPLETE: CPT | Mod: 26,50

## 2025-03-19 PROCEDURE — G0279: CPT

## 2025-03-19 PROCEDURE — 77066 DX MAMMO INCL CAD BI: CPT

## 2025-03-19 PROCEDURE — G0279: CPT | Mod: 26

## 2025-03-20 DIAGNOSIS — N64.4 MASTODYNIA: ICD-10-CM

## 2025-03-23 ENCOUNTER — OUTPATIENT (OUTPATIENT)
Dept: OUTPATIENT SERVICES | Facility: HOSPITAL | Age: 65
LOS: 1 days | End: 2025-03-23
Payer: MEDICARE

## 2025-03-23 DIAGNOSIS — Z98.890 OTHER SPECIFIED POSTPROCEDURAL STATES: Chronic | ICD-10-CM

## 2025-03-23 DIAGNOSIS — Z98.84 BARIATRIC SURGERY STATUS: Chronic | ICD-10-CM

## 2025-03-23 DIAGNOSIS — R92.8 OTHER ABNORMAL AND INCONCLUSIVE FINDINGS ON DIAGNOSTIC IMAGING OF BREAST: ICD-10-CM

## 2025-03-23 DIAGNOSIS — Z98.82 BREAST IMPLANT STATUS: ICD-10-CM

## 2025-03-23 DIAGNOSIS — Z30.2 ENCOUNTER FOR STERILIZATION: Chronic | ICD-10-CM

## 2025-03-23 DIAGNOSIS — Z98.82 BREAST IMPLANT STATUS: Chronic | ICD-10-CM

## 2025-03-23 PROCEDURE — 77047 MRI BREAST C- BILATERAL: CPT

## 2025-03-23 PROCEDURE — 77047 MRI BREAST C- BILATERAL: CPT | Mod: 26

## 2025-03-24 ENCOUNTER — NON-APPOINTMENT (OUTPATIENT)
Age: 65
End: 2025-03-24

## 2025-03-24 DIAGNOSIS — R92.8 OTHER ABNORMAL AND INCONCLUSIVE FINDINGS ON DIAGNOSTIC IMAGING OF BREAST: ICD-10-CM

## 2025-04-02 PROBLEM — R06.02 SHORTNESS OF BREATH ON EXERTION: Status: RESOLVED | Noted: 2017-03-02 | Resolved: 2025-04-02

## 2025-04-08 ENCOUNTER — EMERGENCY (EMERGENCY)
Facility: HOSPITAL | Age: 65
LOS: 0 days | Discharge: HOME | End: 2025-04-09
Attending: EMERGENCY MEDICINE
Payer: MEDICARE

## 2025-04-08 VITALS
HEIGHT: 65 IN | RESPIRATION RATE: 18 BRPM | SYSTOLIC BLOOD PRESSURE: 181 MMHG | OXYGEN SATURATION: 98 % | WEIGHT: 149.91 LBS | DIASTOLIC BLOOD PRESSURE: 80 MMHG | TEMPERATURE: 98 F | HEART RATE: 74 BPM

## 2025-04-08 DIAGNOSIS — Z98.890 OTHER SPECIFIED POSTPROCEDURAL STATES: Chronic | ICD-10-CM

## 2025-04-08 DIAGNOSIS — R10.814 LEFT LOWER QUADRANT ABDOMINAL TENDERNESS: ICD-10-CM

## 2025-04-08 DIAGNOSIS — Z98.82 BREAST IMPLANT STATUS: Chronic | ICD-10-CM

## 2025-04-08 DIAGNOSIS — E78.5 HYPERLIPIDEMIA, UNSPECIFIED: ICD-10-CM

## 2025-04-08 DIAGNOSIS — I10 ESSENTIAL (PRIMARY) HYPERTENSION: ICD-10-CM

## 2025-04-08 DIAGNOSIS — Z30.2 ENCOUNTER FOR STERILIZATION: Chronic | ICD-10-CM

## 2025-04-08 DIAGNOSIS — J44.9 CHRONIC OBSTRUCTIVE PULMONARY DISEASE, UNSPECIFIED: ICD-10-CM

## 2025-04-08 DIAGNOSIS — Z98.84 BARIATRIC SURGERY STATUS: ICD-10-CM

## 2025-04-08 DIAGNOSIS — F32.A DEPRESSION, UNSPECIFIED: ICD-10-CM

## 2025-04-08 DIAGNOSIS — R11.0 NAUSEA: ICD-10-CM

## 2025-04-08 DIAGNOSIS — Z98.84 BARIATRIC SURGERY STATUS: Chronic | ICD-10-CM

## 2025-04-08 DIAGNOSIS — K21.9 GASTRO-ESOPHAGEAL REFLUX DISEASE WITHOUT ESOPHAGITIS: ICD-10-CM

## 2025-04-08 DIAGNOSIS — E03.9 HYPOTHYROIDISM, UNSPECIFIED: ICD-10-CM

## 2025-04-08 DIAGNOSIS — Z87.891 PERSONAL HISTORY OF NICOTINE DEPENDENCE: ICD-10-CM

## 2025-04-08 DIAGNOSIS — Z88.1 ALLERGY STATUS TO OTHER ANTIBIOTIC AGENTS: ICD-10-CM

## 2025-04-08 PROCEDURE — 99285 EMERGENCY DEPT VISIT HI MDM: CPT

## 2025-04-08 PROCEDURE — 80053 COMPREHEN METABOLIC PANEL: CPT

## 2025-04-08 PROCEDURE — 36415 COLL VENOUS BLD VENIPUNCTURE: CPT

## 2025-04-08 PROCEDURE — 83690 ASSAY OF LIPASE: CPT

## 2025-04-08 PROCEDURE — 83605 ASSAY OF LACTIC ACID: CPT

## 2025-04-08 PROCEDURE — 93005 ELECTROCARDIOGRAM TRACING: CPT

## 2025-04-08 PROCEDURE — 99285 EMERGENCY DEPT VISIT HI MDM: CPT | Mod: 25

## 2025-04-08 PROCEDURE — 96374 THER/PROPH/DIAG INJ IV PUSH: CPT | Mod: XU

## 2025-04-08 PROCEDURE — 85025 COMPLETE CBC W/AUTO DIFF WBC: CPT

## 2025-04-08 PROCEDURE — 74177 CT ABD & PELVIS W/CONTRAST: CPT

## 2025-04-08 RX ORDER — IOHEXOL 350 MG/ML
30 INJECTION, SOLUTION INTRAVENOUS ONCE
Refills: 0 | Status: COMPLETED | OUTPATIENT
Start: 2025-04-08 | End: 2025-04-08

## 2025-04-08 RX ORDER — ONDANSETRON HCL/PF 4 MG/2 ML
4 VIAL (ML) INJECTION ONCE
Refills: 0 | Status: COMPLETED | OUTPATIENT
Start: 2025-04-08 | End: 2025-04-08

## 2025-04-08 RX ORDER — SODIUM CHLORIDE 9 G/1000ML
1000 INJECTION, SOLUTION INTRAVENOUS ONCE
Refills: 0 | Status: COMPLETED | OUTPATIENT
Start: 2025-04-08 | End: 2025-04-08

## 2025-04-08 RX ORDER — SUCRALFATE 1 G
1 TABLET ORAL ONCE
Refills: 0 | Status: COMPLETED | OUTPATIENT
Start: 2025-04-08 | End: 2025-04-08

## 2025-04-08 RX ORDER — SIMETHICONE 80 MG
80 TABLET,CHEWABLE ORAL ONCE
Refills: 0 | Status: COMPLETED | OUTPATIENT
Start: 2025-04-08 | End: 2025-04-08

## 2025-04-08 RX ADMIN — Medication 4 MILLIGRAM(S): at 23:35

## 2025-04-08 RX ADMIN — Medication 80 MILLIGRAM(S): at 23:34

## 2025-04-08 RX ADMIN — Medication 1 GRAM(S): at 23:34

## 2025-04-08 RX ADMIN — IOHEXOL 30 MILLILITER(S): 350 INJECTION, SOLUTION INTRAVENOUS at 23:35

## 2025-04-08 NOTE — ED PROVIDER NOTE - CARE PROVIDER_API CALL
Kp Hedrick  Internal Medicine  [address], [place] [id number]  Phone: [phone]  Fax: [phone]  Follow Up Time: 1-3 Days    Russell Mason  Gastroenterology  [address] [place] [address]  Phone: ()-  Fax: ()-  Follow Up Time: Routine    Pam Garcia NP in Adult Health  [address] [place] [address]  Phone: [phone]  Fax: [phone]  Follow Up Time: 1-3 Days

## 2025-04-08 NOTE — ED PROVIDER NOTE - PHYSICAL EXAMINATION
Gen: in mild distress, A&Ox3  HEENT: normal conjunctiva, tongue midline, oral mucosa moist  Lung: CTAB, no respiratory distress, no wheezes/rhonchi/rales B/L, speaking in full sentences  CV: RRR, no murmurs, rubs or gallops  Abd: soft, +LLQ and suprapubic TTP, ND, no guarding, no rigidity, no rebound tenderness  Neuro/MSK: MAEx4 spontaneously  Extremities: trace lower extremity edema bilaterally, cap refill <2s  Skin: Warm, well perfused

## 2025-04-08 NOTE — ED PROVIDER NOTE - NS ED ROS FT
CONSTITUTIONAL: No fevers, no chills  CV: No chest pain, no palpitations  RESP: No SOB, no cough  GI: +abdominal pain, +nausea, no vomiting  : No dysuria, no hematuria, no flank pain  MSK: no swelling of extremities, no back pain, no extremity pain  SKIN: No rashes  NEURO: No headache, no focal weakness, no decreased sensation/paresthesias

## 2025-04-08 NOTE — ED PROVIDER NOTE - CARE PROVIDERS DIRECT ADDRESSES
[email or web address]

## 2025-04-08 NOTE — ED PROVIDER NOTE - NSFOLLOWUPINSTRUCTIONS_ED_ALL_ED_FT
You came in with abdominal pain, we performed labs which showed some dehydration and imaging which was unremarkable. We gave you some medicine for your pain, nausea, and gas and you improved. Please follow up with your primary care provider, bariatric surgeon, and gastroenterologist.    Abdominal Pain    WHAT YOU NEED TO KNOW:    What do I need to know about abdominal pain? Abdominal pain may be felt anywhere between the bottom of your rib cage and your groin. Acute pain usually lasts less than 3 months. Chronic pain lasts longer than 3 months. Your pain may be sharp or dull. The pain may stay in the same place or move around. You may have the pain all the time, or it may come and go. Depending on the cause, you may also have nausea, vomiting, fever, or diarrhea.  Abdominal Organs    What causes abdominal pain? The cause may not be found. The following are common causes:    Overeating, gas pains, or food poisoning    Constipation or diarrhea    An injury    Appendicitis, a hernia, or an ulcer    Infection or a blockage    A liver, gallbladder, or kidney condition  How is the cause of abdominal pain diagnosed? Your healthcare provider will check your abdomen. He or she will ask where your pain is and when it started. Tell him or her if the pain wakes you or stops you from doing your daily activities. Describe anything that makes the pain better or worse. You may also need any of the following:    Blood, urine, or bowel movement samples may be tested for signs of an infection, disease, or injury.    X-ray pictures of your abdomen may show an injury or other cause of the pain.  How is abdominal pain treated?    Prescription pain medicine may be given. Ask your healthcare provider how to take this medicine safely. Some prescription pain medicines contain acetaminophen. Do not take other medicines that contain acetaminophen without talking to your healthcare provider. Too much acetaminophen may cause liver damage. Prescription pain medicine may cause constipation. Ask your healthcare provider how to prevent or treat constipation.    Medicines may be given to calm your stomach or prevent vomiting.    Relaxation therapy may be used along with pain medicine.    Surgery may be needed, depending on the cause.  What can I do to manage or prevent abdominal pain?    Apply heat on your abdomen for 20 to 30 minutes every 2 hours for as many days as directed. Heat helps decrease pain and muscle spasms.    Make changes to the foods you eat, if needed. Do not eat foods that cause abdominal pain or other symptoms. Eat small meals more often. The following changes may also help:  Eat more high-fiber foods if you are constipated. High-fiber foods include fruits, vegetables, whole-grain foods, and legumes such as coombs beans.        Do not eat foods that cause gas if you have bloating. Examples include broccoli, cabbage, beans, and carbonated drinks.    Do not eat foods or drinks that contain sorbitol or fructose if you have diarrhea and bloating. Some examples are fruit juices, candy, jelly, and sugar-free gum.    Do not eat high-fat foods. Examples include fried foods, cheeseburgers, hot dogs, and desserts.    Make changes to the liquids you drink, if needed. Do not drink liquids that cause pain or make it worse, such as orange juice. Drink liquids throughout the day to stay hydrated. The following changes may also help:  Drink more liquids to prevent dehydration from diarrhea or vomiting. Ask your healthcare provider how much liquid to drink each day and which liquids are best for you.    Limit or do not have caffeine. Caffeine may make symptoms such as heartburn or nausea worse.    Limit or do not drink alcohol. Alcohol can make your abdominal pain worse. Ask your healthcare provider if it is okay for you to drink alcohol. Also ask how much is okay for you to drink. A drink of alcohol is 12 ounces of beer, ½ ounce of liquor, or 5 ounces of wine.    Keep a diary of your abdominal pain. A diary may help your healthcare provider learn what is causing your pain. Include when the pain happens, how long it lasts, and what the pain feels like. Write down any other symptoms you have with abdominal pain. Also write down what you eat, and any symptoms you have after you eat.    Manage stress. Stress may cause abdominal pain. Your healthcare provider may recommend relaxation techniques and deep breathing exercises to help decrease your stress. Your healthcare provider may recommend you talk to someone about your stress or anxiety, such as a counselor or a friend. Get plenty of sleep. Exercise regularly.  [employer] Walking for Exercise      Do not smoke. Nicotine and other chemicals in cigarettes can damage your esophagus and stomach. Ask your healthcare provider for information if you currently smoke and need help to quit. E-cigarettes or smokeless tobacco still contain nicotine. Talk to your healthcare provider before you use these products.  Call your local emergency number (911 in the [place]) if:    You have chest pain or shortness of breath.    When should I seek immediate care?    You have pulsing pain in your upper abdomen or lower back that suddenly becomes constant.    Your pain is in the right lower abdominal area and worsens with movement.    You have a fever over 100.4°F (38°C) or shaking chills.    You are vomiting and cannot keep food or liquids down.    Your pain does not improve or gets worse over the next 8 to 12 hours.    You see blood in your vomit or bowel movements, or they look black and tarry.    Your skin or the whites of your eyes turn yellow.    You are a woman and have a large amount of vaginal bleeding that is not your monthly period.  When should I call my doctor?    You have pain in your lower back.    You are a man and have pain in your testicles.    You have pain when you urinate.    You have questions or concerns about your condition or care.  CARE AGREEMENT:    You have the right to help plan your care. Learn about your health condition and how it may be treated. Discuss treatment options with your healthcare providers to decide what care you want to receive. You always have the right to refuse treatment.

## 2025-04-08 NOTE — ED PROVIDER NOTE - PROGRESS NOTE DETAILS
Patient feeling improved, CT negative for acute pathology. Discussed results with patient, she is amenable for discharge home with GI and bariatric surgery followup. Will send with carafate, patient already takes simethicone at home

## 2025-04-08 NOTE — ED PROVIDER NOTE - PROVIDER TOKENS
PROVIDER:[TOKEN:[[id number]],FOLLOWUP:[1-3 Days]],PROVIDER:[TOKEN:[[id number]],FOLLOWUP:[Routine]],PROVIDER:[TOKEN:[[id number]],FOLLOWUP:[1-3 Days]]

## 2025-04-08 NOTE — ED PROVIDER NOTE - PATIENT PORTAL LINK FT
You can access the FollowMyHealth Patient Portal offered by [facility] by registering at the following website: [email or web address]. By joining [facility]’s FollowMyHealth portal, you will also be able to view your health information using other applications (apps) compatible with our system.

## 2025-04-08 NOTE — ED PROVIDER NOTE - OBJECTIVE STATEMENT
63yo F with PMHx/PSHx bariatric surgery (Dr. Olvera 2018) complicated by internal hernia and mesenteric defect s/p repair (02/2022, Dr. Alvarado), left inguinal hernia repair w/ mesh (07/2022, Dr. Bruno), OUD, HTN, HLD, hypothyroidism, depression, COPD, GERD presenting with acute on subacute abdominal pain. Patient has had epigastric abdominal pain and gas pain for the past month that worsens with food intake. Patient had screening colonoscopy today and since she got home and tried to eat some soup she developed severe abdominal pain that prompted her to come to the ER. Pain is 8/10, constant, worsens with food or liquid intake. +passing gas (though patient feels like "gas is trapped), last BM was prior to colonoscopy during prep. Mild nausea, no vomiting. Denies fevers/chills, chest pain, SOB, melena, urinary symptoms.

## 2025-04-08 NOTE — ED PROVIDER NOTE - CLINICAL SUMMARY MEDICAL DECISION MAKING FREE TEXT BOX
65 yo F, hx of gastric bypass in 2018 with Dr. Olvera, complicated by internal hernia in 2022 repaired by Dr. Alvarado, here for assessment of abdominal pain. Patient notes months of post prandial abdominal pain described as gas pains. Today had colonoscopy, ate soup after and had episode of pain prompting ED visit.    No vomiting, no diarrhea after coloscopy, just had expected sx with prep, No fever or chills.    VS notable for elevated BP -- on exam is well appearing, in no distress, has clear lungs, RRR, soft, ND abdomen, no localized ttp.     Labs unremarkable, CT scan without acute abnormalities. Patient received GI cocktail, non narcotic, with complete resolution in symptoms.    Sx appear chronic, no indication for emergent GI/surgical eval.     Advised on need for close monitoring of symptoms, return precautions, follow up.

## 2025-04-09 VITALS
RESPIRATION RATE: 18 BRPM | OXYGEN SATURATION: 98 % | TEMPERATURE: 97 F | SYSTOLIC BLOOD PRESSURE: 166 MMHG | DIASTOLIC BLOOD PRESSURE: 82 MMHG

## 2025-04-09 LAB
ALBUMIN SERPL ELPH-MCNC: 4 G/DL — SIGNIFICANT CHANGE UP (ref 3.5–5.2)
ALP SERPL-CCNC: 89 U/L — SIGNIFICANT CHANGE UP (ref 30–115)
ALT FLD-CCNC: 22 U/L — SIGNIFICANT CHANGE UP (ref 0–41)
ANION GAP SERPL CALC-SCNC: 9 MMOL/L — SIGNIFICANT CHANGE UP (ref 7–14)
AST SERPL-CCNC: 29 U/L — SIGNIFICANT CHANGE UP (ref 0–41)
BASOPHILS # BLD AUTO: 0.03 K/UL — SIGNIFICANT CHANGE UP (ref 0–0.2)
BASOPHILS NFR BLD AUTO: 0.4 % — SIGNIFICANT CHANGE UP (ref 0–1)
BILIRUB SERPL-MCNC: 0.5 MG/DL — SIGNIFICANT CHANGE UP (ref 0.2–1.2)
BUN SERPL-MCNC: 5 MG/DL — LOW (ref 10–20)
CALCIUM SERPL-MCNC: 9.1 MG/DL — SIGNIFICANT CHANGE UP (ref 8.4–10.5)
CHLORIDE SERPL-SCNC: 104 MMOL/L — SIGNIFICANT CHANGE UP (ref 98–110)
CO2 SERPL-SCNC: 27 MMOL/L — SIGNIFICANT CHANGE UP (ref 17–32)
CREAT SERPL-MCNC: 0.5 MG/DL — LOW (ref 0.7–1.5)
EGFR: 105 ML/MIN/1.73M2 — SIGNIFICANT CHANGE UP
EGFR: 105 ML/MIN/1.73M2 — SIGNIFICANT CHANGE UP
EOSINOPHIL # BLD AUTO: 0.05 K/UL — SIGNIFICANT CHANGE UP (ref 0–0.7)
EOSINOPHIL NFR BLD AUTO: 0.7 % — SIGNIFICANT CHANGE UP (ref 0–8)
GLUCOSE SERPL-MCNC: 73 MG/DL — SIGNIFICANT CHANGE UP (ref 70–99)
HCT VFR BLD CALC: 35 % — LOW (ref 37–47)
HGB BLD-MCNC: 12.5 G/DL — SIGNIFICANT CHANGE UP (ref 12–16)
IMM GRANULOCYTES NFR BLD AUTO: 0.4 % — HIGH (ref 0.1–0.3)
LACTATE SERPL-SCNC: 2.3 MMOL/L — HIGH (ref 0.7–2)
LIDOCAIN IGE QN: 30 U/L — SIGNIFICANT CHANGE UP (ref 7–60)
LYMPHOCYTES # BLD AUTO: 2.05 K/UL — SIGNIFICANT CHANGE UP (ref 1.2–3.4)
LYMPHOCYTES # BLD AUTO: 26.8 % — SIGNIFICANT CHANGE UP (ref 20.5–51.1)
MCHC RBC-ENTMCNC: 31 PG — SIGNIFICANT CHANGE UP (ref 27–31)
MCHC RBC-ENTMCNC: 35.7 G/DL — SIGNIFICANT CHANGE UP (ref 32–37)
MCV RBC AUTO: 86.8 FL — SIGNIFICANT CHANGE UP (ref 81–99)
MONOCYTES # BLD AUTO: 0.64 K/UL — HIGH (ref 0.1–0.6)
MONOCYTES NFR BLD AUTO: 8.4 % — SIGNIFICANT CHANGE UP (ref 1.7–9.3)
NEUTROPHILS # BLD AUTO: 4.85 K/UL — SIGNIFICANT CHANGE UP (ref 1.4–6.5)
NEUTROPHILS NFR BLD AUTO: 63.3 % — SIGNIFICANT CHANGE UP (ref 42.2–75.2)
NRBC BLD AUTO-RTO: 0 /100 WBCS — SIGNIFICANT CHANGE UP (ref 0–0)
PLATELET # BLD AUTO: 286 K/UL — SIGNIFICANT CHANGE UP (ref 130–400)
PMV BLD: 9.5 FL — SIGNIFICANT CHANGE UP (ref 7.4–10.4)
POTASSIUM SERPL-MCNC: 4.2 MMOL/L — SIGNIFICANT CHANGE UP (ref 3.5–5)
POTASSIUM SERPL-SCNC: 4.2 MMOL/L — SIGNIFICANT CHANGE UP (ref 3.5–5)
PROT SERPL-MCNC: 7 G/DL — SIGNIFICANT CHANGE UP (ref 6–8)
RBC # BLD: 4.03 M/UL — LOW (ref 4.2–5.4)
RBC # FLD: 13 % — SIGNIFICANT CHANGE UP (ref 11.5–14.5)
SODIUM SERPL-SCNC: 140 MMOL/L — SIGNIFICANT CHANGE UP (ref 135–146)
WBC # BLD: 7.65 K/UL — SIGNIFICANT CHANGE UP (ref 4.8–10.8)
WBC # FLD AUTO: 7.65 K/UL — SIGNIFICANT CHANGE UP (ref 4.8–10.8)

## 2025-04-09 PROCEDURE — 74177 CT ABD & PELVIS W/CONTRAST: CPT | Mod: 26

## 2025-04-09 PROCEDURE — 93010 ELECTROCARDIOGRAM REPORT: CPT

## 2025-04-09 RX ORDER — SUCRALFATE 1 G
10 TABLET ORAL
Qty: 560 | Refills: 0
Start: 2025-04-09 | End: 2025-04-22

## 2025-04-09 RX ADMIN — SODIUM CHLORIDE 1000 MILLILITER(S): 9 INJECTION, SOLUTION INTRAVENOUS at 00:57

## 2025-04-11 ENCOUNTER — APPOINTMENT (OUTPATIENT)
Dept: ORTHOPEDIC SURGERY | Facility: CLINIC | Age: 65
End: 2025-04-11

## 2025-04-11 DIAGNOSIS — M17.11 UNILATERAL PRIMARY OSTEOARTHRITIS, RIGHT KNEE: ICD-10-CM

## 2025-04-11 PROCEDURE — 99213 OFFICE O/P EST LOW 20 MIN: CPT | Mod: 25

## 2025-04-11 PROCEDURE — 73562 X-RAY EXAM OF KNEE 3: CPT | Mod: 50

## 2025-04-11 RX ORDER — HYLAN G-F 20 16MG/2ML
16 SYRINGE (ML) INTRAARTICULAR
Qty: 1 | Refills: 0 | Status: ACTIVE | OUTPATIENT
Start: 2025-04-11

## 2025-04-14 ENCOUNTER — APPOINTMENT (OUTPATIENT)
Dept: SURGERY | Facility: CLINIC | Age: 65
End: 2025-04-14
Payer: MEDICARE

## 2025-04-14 ENCOUNTER — APPOINTMENT (OUTPATIENT)
Dept: PLASTIC SURGERY | Facility: CLINIC | Age: 65
End: 2025-04-14
Payer: MEDICARE

## 2025-04-14 VITALS
HEART RATE: 60 BPM | WEIGHT: 153 LBS | HEIGHT: 65 IN | TEMPERATURE: 97 F | SYSTOLIC BLOOD PRESSURE: 122 MMHG | OXYGEN SATURATION: 95 % | BODY MASS INDEX: 25.49 KG/M2 | DIASTOLIC BLOOD PRESSURE: 80 MMHG

## 2025-04-14 DIAGNOSIS — I10 ESSENTIAL (PRIMARY) HYPERTENSION: ICD-10-CM

## 2025-04-14 DIAGNOSIS — Z98.82 BREAST IMPLANT STATUS: ICD-10-CM

## 2025-04-14 DIAGNOSIS — M19.90 UNSPECIFIED OSTEOARTHRITIS, UNSPECIFIED SITE: ICD-10-CM

## 2025-04-14 DIAGNOSIS — E66.9 OBESITY, UNSPECIFIED: ICD-10-CM

## 2025-04-14 PROCEDURE — 99213 OFFICE O/P EST LOW 20 MIN: CPT

## 2025-04-14 RX ORDER — FAMOTIDINE 40 MG/1
40 TABLET, FILM COATED ORAL TWICE DAILY
Qty: 60 | Refills: 1 | Status: ACTIVE | COMMUNITY
Start: 2025-04-14 | End: 1900-01-01

## 2025-05-23 ENCOUNTER — APPOINTMENT (OUTPATIENT)
Dept: ORTHOPEDIC SURGERY | Facility: CLINIC | Age: 65
End: 2025-05-23
Payer: MEDICARE

## 2025-05-23 PROCEDURE — 20610 DRAIN/INJ JOINT/BURSA W/O US: CPT | Mod: RT

## 2025-05-30 ENCOUNTER — APPOINTMENT (OUTPATIENT)
Dept: ORTHOPEDIC SURGERY | Facility: CLINIC | Age: 65
End: 2025-05-30
Payer: MEDICARE

## 2025-05-30 DIAGNOSIS — M17.11 UNILATERAL PRIMARY OSTEOARTHRITIS, RIGHT KNEE: ICD-10-CM

## 2025-05-30 PROCEDURE — 20610 DRAIN/INJ JOINT/BURSA W/O US: CPT | Mod: RT

## 2025-06-06 ENCOUNTER — APPOINTMENT (OUTPATIENT)
Dept: ORTHOPEDIC SURGERY | Facility: CLINIC | Age: 65
End: 2025-06-06
Payer: MEDICARE

## 2025-06-06 PROCEDURE — 20610 DRAIN/INJ JOINT/BURSA W/O US: CPT | Mod: RT

## 2025-06-20 ENCOUNTER — APPOINTMENT (OUTPATIENT)
Age: 65
End: 2025-06-20